# Patient Record
Sex: FEMALE | Race: WHITE
[De-identification: names, ages, dates, MRNs, and addresses within clinical notes are randomized per-mention and may not be internally consistent; named-entity substitution may affect disease eponyms.]

---

## 2014-05-23 VITALS — SYSTOLIC BLOOD PRESSURE: 113 MMHG | DIASTOLIC BLOOD PRESSURE: 72 MMHG

## 2020-01-03 ENCOUNTER — HOSPITAL ENCOUNTER (OUTPATIENT)
Dept: HOSPITAL 63 - CT | Age: 45
Discharge: HOME | End: 2020-01-03
Attending: FAMILY MEDICINE
Payer: COMMERCIAL

## 2020-01-03 DIAGNOSIS — R51: Primary | ICD-10-CM

## 2020-01-03 PROCEDURE — 70450 CT HEAD/BRAIN W/O DYE: CPT

## 2020-01-03 NOTE — RAD
CT Head W/O Contrast:

 

History: Headache with aura 24 hours

 

Comparison: none

 

Axial images were obtained without contrast.

 

The gray and white matter appears normal and symmetrical for the patients 

age.  There is no mass effect, extraaxial fluid collections or 

hydrocephalus.  There is no gross bleed.  There is no focal loss of 

gray-white matter distinction to suggest acute ischemia, i.e. stroke.

 

Impression:  No acute findings.

 

RS Compliance Statement:

 

One or more of the following individualized dose reduction techniques were

utilized for this examination:  

1. Automated exposure control  

2. Adjustment of the mA and/or kV according to patient size  

3. Use of iterative reconstruction technique

 

Electronically signed by: Ray Painting III, MD (1/3/2020 5:34 PM) [facility]

## 2020-09-22 ENCOUNTER — HOSPITAL ENCOUNTER (OUTPATIENT)
Dept: HOSPITAL 61 - KCIC MRI | Age: 45
Discharge: HOME | End: 2020-09-22
Payer: COMMERCIAL

## 2020-09-22 DIAGNOSIS — R22.41: Primary | ICD-10-CM

## 2020-09-22 PROCEDURE — 73720 MRI LWR EXTREMITY W/O&W/DYE: CPT

## 2020-09-22 PROCEDURE — 82565 ASSAY OF CREATININE: CPT

## 2020-09-22 NOTE — KCIC
EXAMINATION: MRI RIGHT PROXIMAL THIGH WITHOUT IV CONTRAST

 

CLINICAL HISTORY: Anterior right thigh mass

 

TECHNIQUE: Multiplanar multisequential images obtained through the 

anterior right proximal thigh without intravenous contrast.

- Contrast: Clariscan 20 mL IV

 

COMPARISON: None

 

 

FINDINGS:  

 

No evidence of soft tissue mass, focal fluid collection, or abnormal 

contrast enhancement at the region of interest in the proximal right 

thigh.

 

Mild reactive changes in the anterior iliac crest at the origin of the 

tensor fascial kuldip muscle. Muscles and tendons otherwise within normal 

limits.

 

No evidence of acute fracture or suspicious marrow replacing process on 

limited evaluation of the right pelvic bones and proximal femur.

 

 

IMPRESSION:  

 

Unremarkable exam. No evidence of soft tissue mass in the region of 

interest.

 

Electronically signed by: Gene Ford DO (9/22/2020 2:15 PM) [id number]

## 2020-10-08 ENCOUNTER — HOSPITAL ENCOUNTER (INPATIENT)
Dept: HOSPITAL 63 - ER | Age: 45
LOS: 2 days | Discharge: ANOTHER HOSPITAL | DRG: 177 | End: 2020-10-10
Attending: INTERNAL MEDICINE | Admitting: INTERNAL MEDICINE
Payer: COMMERCIAL

## 2020-10-08 VITALS — SYSTOLIC BLOOD PRESSURE: 102 MMHG | DIASTOLIC BLOOD PRESSURE: 63 MMHG

## 2020-10-08 VITALS — SYSTOLIC BLOOD PRESSURE: 103 MMHG | DIASTOLIC BLOOD PRESSURE: 74 MMHG

## 2020-10-08 VITALS — WEIGHT: 218.04 LBS | HEIGHT: 63 IN | BODY MASS INDEX: 38.63 KG/M2

## 2020-10-08 VITALS — SYSTOLIC BLOOD PRESSURE: 97 MMHG | DIASTOLIC BLOOD PRESSURE: 57 MMHG

## 2020-10-08 VITALS — DIASTOLIC BLOOD PRESSURE: 65 MMHG | SYSTOLIC BLOOD PRESSURE: 104 MMHG

## 2020-10-08 DIAGNOSIS — G43.909: ICD-10-CM

## 2020-10-08 DIAGNOSIS — E87.1: ICD-10-CM

## 2020-10-08 DIAGNOSIS — J18.9: ICD-10-CM

## 2020-10-08 DIAGNOSIS — J96.01: ICD-10-CM

## 2020-10-08 DIAGNOSIS — E11.65: ICD-10-CM

## 2020-10-08 DIAGNOSIS — K75.81: ICD-10-CM

## 2020-10-08 DIAGNOSIS — U07.1: Primary | ICD-10-CM

## 2020-10-08 DIAGNOSIS — Z79.4: ICD-10-CM

## 2020-10-08 LAB
ALBUMIN SERPL-MCNC: 3.3 G/DL (ref 3.4–5)
ALBUMIN/GLOB SERPL: 0.8 {RATIO} (ref 1–1.7)
ALP SERPL-CCNC: 141 U/L (ref 46–116)
ALT SERPL-CCNC: 100 U/L (ref 14–59)
ANION GAP SERPL CALC-SCNC: 10 MMOL/L (ref 6–14)
APTT PPP: YELLOW S
AST SERPL-CCNC: 101 U/L (ref 15–37)
BACTERIA #/AREA URNS HPF: (no result) /HPF
BASOPHILS # BLD AUTO: 0.1 X10^3/UL (ref 0–0.2)
BASOPHILS NFR BLD: 1 % (ref 0–3)
BILIRUB SERPL-MCNC: 0.2 MG/DL (ref 0.2–1)
BILIRUB UR QL STRIP: (no result)
BUN/CREAT SERPL: 8 (ref 6–20)
CA-I SERPL ISE-MCNC: 10 MG/DL (ref 7–20)
CALCIUM SERPL-MCNC: 8.8 MG/DL (ref 8.5–10.1)
CHLORIDE SERPL-SCNC: 97 MMOL/L (ref 98–107)
CO2 SERPL-SCNC: 25 MMOL/L (ref 21–32)
CREAT SERPL-MCNC: 1.2 MG/DL (ref 0.6–1)
EOSINOPHIL NFR BLD: 0 % (ref 0–3)
EOSINOPHIL NFR BLD: 0 X10^3/UL (ref 0–0.7)
ERYTHROCYTE [DISTWIDTH] IN BLOOD BY AUTOMATED COUNT: 14 % (ref 11.5–14.5)
FIBRINOGEN PPP-MCNC: (no result) MG/DL
GFR SERPLBLD BASED ON 1.73 SQ M-ARVRAT: 48.8 ML/MIN
GLOBULIN SER-MCNC: 4.2 G/DL (ref 2.2–3.8)
GLUCOSE SERPL-MCNC: 234 MG/DL (ref 70–99)
GLUCOSE UR STRIP-MCNC: 100 MG/DL
HCT VFR BLD CALC: 42.1 % (ref 36–47)
HGB BLD-MCNC: 13.7 G/DL (ref 12–15.5)
LYMPHOCYTES # BLD: 1.2 X10^3/UL (ref 1–4.8)
LYMPHOCYTES NFR BLD AUTO: 18 % (ref 24–48)
MAGNESIUM SERPL-MCNC: 1.8 MG/DL (ref 1.8–2.4)
MCH RBC QN AUTO: 30 PG (ref 25–35)
MCHC RBC AUTO-ENTMCNC: 33 G/DL (ref 31–37)
MCV RBC AUTO: 91 FL (ref 79–100)
MONO #: 0.8 X10^3/UL (ref 0–1.1)
MONOCYTES NFR BLD: 13 % (ref 0–9)
NEUT #: 4.5 X10^3UL (ref 1.8–7.7)
NEUTROPHILS NFR BLD AUTO: 69 % (ref 31–73)
NITRITE UR QL STRIP: (no result)
PLATELET # BLD AUTO: 232 X10^3/UL (ref 140–400)
POTASSIUM SERPL-SCNC: 4.3 MMOL/L (ref 3.5–5.1)
PROT SERPL-MCNC: 7.5 G/DL (ref 6.4–8.2)
RBC # BLD AUTO: 4.64 X10^6/UL (ref 3.5–5.4)
RBC #/AREA URNS HPF: (no result) /HPF (ref 0–2)
SODIUM SERPL-SCNC: 132 MMOL/L (ref 136–145)
SP GR UR STRIP: 1.01
SQUAMOUS #/AREA URNS LPF: (no result) /LPF
UROBILINOGEN UR-MCNC: 0.2 MG/DL
WBC # BLD AUTO: 6.6 X10^3/UL (ref 4–11)
WBC #/AREA URNS HPF: >40 /HPF (ref 0–4)

## 2020-10-08 PROCEDURE — 71045 X-RAY EXAM CHEST 1 VIEW: CPT

## 2020-10-08 PROCEDURE — 87186 SC STD MICRODIL/AGAR DIL: CPT

## 2020-10-08 PROCEDURE — 90471 IMMUNIZATION ADMIN: CPT

## 2020-10-08 PROCEDURE — 36600 WITHDRAWAL OF ARTERIAL BLOOD: CPT

## 2020-10-08 PROCEDURE — 83605 ASSAY OF LACTIC ACID: CPT

## 2020-10-08 PROCEDURE — 86140 C-REACTIVE PROTEIN: CPT

## 2020-10-08 PROCEDURE — 87077 CULTURE AEROBIC IDENTIFY: CPT

## 2020-10-08 PROCEDURE — 85610 PROTHROMBIN TIME: CPT

## 2020-10-08 PROCEDURE — 85379 FIBRIN DEGRADATION QUANT: CPT

## 2020-10-08 PROCEDURE — 82553 CREATINE MB FRACTION: CPT

## 2020-10-08 PROCEDURE — 83735 ASSAY OF MAGNESIUM: CPT

## 2020-10-08 PROCEDURE — 93005 ELECTROCARDIOGRAM TRACING: CPT

## 2020-10-08 PROCEDURE — 85730 THROMBOPLASTIN TIME PARTIAL: CPT

## 2020-10-08 PROCEDURE — 85027 COMPLETE CBC AUTOMATED: CPT

## 2020-10-08 PROCEDURE — 85025 COMPLETE CBC W/AUTO DIFF WBC: CPT

## 2020-10-08 PROCEDURE — 96375 TX/PRO/DX INJ NEW DRUG ADDON: CPT

## 2020-10-08 PROCEDURE — 96367 TX/PROPH/DG ADDL SEQ IV INF: CPT

## 2020-10-08 PROCEDURE — 82803 BLOOD GASES ANY COMBINATION: CPT

## 2020-10-08 PROCEDURE — 84484 ASSAY OF TROPONIN QUANT: CPT

## 2020-10-08 PROCEDURE — 96365 THER/PROPH/DIAG IV INF INIT: CPT

## 2020-10-08 PROCEDURE — 87086 URINE CULTURE/COLONY COUNT: CPT

## 2020-10-08 PROCEDURE — 81001 URINALYSIS AUTO W/SCOPE: CPT

## 2020-10-08 PROCEDURE — 82947 ASSAY GLUCOSE BLOOD QUANT: CPT

## 2020-10-08 PROCEDURE — 96361 HYDRATE IV INFUSION ADD-ON: CPT

## 2020-10-08 PROCEDURE — 80053 COMPREHEN METABOLIC PANEL: CPT

## 2020-10-08 PROCEDURE — 36415 COLL VENOUS BLD VENIPUNCTURE: CPT

## 2020-10-08 RX ADMIN — DULOXETINE SCH MG: 60 CAPSULE, DELAYED RELEASE ORAL at 20:57

## 2020-10-08 RX ADMIN — VERAPAMIL HYDROCHLORIDE SCH MG: 120 TABLET, FILM COATED, EXTENDED RELEASE ORAL at 20:57

## 2020-10-08 RX ADMIN — ONDANSETRON PRN MG: 2 INJECTION INTRAMUSCULAR; INTRAVENOUS at 20:59

## 2020-10-08 RX ADMIN — INSULIN GLARGINE SCH UNIT: 100 INJECTION, SOLUTION SUBCUTANEOUS at 21:00

## 2020-10-08 NOTE — RAD
Portable chest x-ray for shortness of breath, no prior.

 

FINDINGS: Right hemidiaphragm is slightly elevated. There is a linear band

of atelectasis in the right midlung, and there may be a subtle left 

basilar infiltrate. Heart size within normal limits.

 

Impression:

1. Right midlung atelectasis, and possible subtle left retrocardiac 

infiltrate.

 

Electronically signed by: George Prieto MD (10/8/2020 8:49 AM) [id number]

## 2020-10-08 NOTE — PHYS DOC
Past History


Past Medical History:  Diabetes, DVT, Fibromyalgia, Hypertension, Kidney Stones,

Migraines


Past Surgical History:  Cholecystectomy, Hysterectomy


Smoking:  Non-smoker


Alcohol Use:  None


Drug Use:  None





General Adult


EDM:


Chief Complaint:  HEADACHE





HPI:


HPI:





45-year-old female presents with 2-day history of generalized malaise, headache,

and nausea.  Patient also reports subjective fever and chills.  Patient reports 

she does have a history of chronic migraines but reports this headache is worse 

than her normal.  Patient reports her muscles "ache ".  Denies known exposure to

COVID-19.  Denies known sick contacts.  Patient does report some associated 

shortness of air.





Review of Systems:


Review of Systems:





Constitutional: Reports subjective fever and chills 


Eyes: Denies redness or eye pain 


HENT: Denies nasal congestion or sore throat


Respiratory: Denies cough; reports shortness of breath 


Cardiovascular: Denies chest pain or palpitations


GI: Denies abdominal pain or vomiting; reports nausea


: Denies dysuria or hematuria


Musculoskeletal: Denies back pain or joint pain


Integument: Denies rash or skin lesions 


Neurologic: Reports headache; denies focal weakness or sensory changes





Complete systems were reviewed and found to be within normal limits, except as 

documented in this note.





Current Medications:


Current Meds:





Current Medications








 Medications


  (Trade)  Dose


 Ordered  Sig/[facility]  Start Time


 Stop Time Status Last Admin


Dose Admin


 


 Acetaminophen/


 Butalbital/


 Caffeine


  (Fioricet)  1 tab  1X  ONCE  10/8/20 05:00


 10/8/20 05:01 DC  





 


 Dexamethasone


 Sodium Phosphate


  (Decadron)  10 mg  1X  ONCE  10/8/20 05:00


 10/8/20 05:01 DC  





 


 Ondansetron HCl


  (Zofran)  4 mg  1X  ONCE  10/8/20 04:30


 10/8/20 04:31 DC 10/8/20 04:14


4 MG


 


 Sodium Chloride  1,000 ml @ 


 1,000 mls/hr  1X  ONCE  10/8/20 04:30


 10/8/20 05:29 DC 10/8/20 04:14


1,000 MLS/HR











Allergies:


Allergies:





Allergies








Coded Allergies Type Severity Reaction Last Updated Verified


 


  meperidine Allergy Severe  10/8/20 Yes


 


  morphine Allergy Intermediate  10/8/20 Yes











Physical Exam:


PE:





Constitutional: Well developed, well nourished, uncomfortable, non-toxic ap

pearance


HENT: Normocephalic, atraumatic


Eyes: PERRL, EOMI, conjunctiva normal, no discharge, no nystagmus noted


Neck: Normal range of motion, reports tenderness with rotation of neck, supple, 

no meningeal signs


Lungs & Thorax:  No respiratory distress, equal chest rise and fall


Abdomen: Soft, no tenderness, no guarding/rebound tenderness


Skin: Warm, dry, no erythema, no rash


Extremities: No tenderness, ROM intact, no edema


Neurologic: Alert and oriented X 3, normal motor function, normal sensory 

function, no focal deficits noted


Psychologic: Affect anxious, judgment normal





Current Patient Data:


Labs:





                                Laboratory Tests








Test


 10/8/20


04:29 10/8/20


05:00


 


White Blood Count


 6.6 x10^3/uL


(4.0-11.0) 





 


Red Blood Count


 4.64 x10^6/uL


(3.50-5.40) 





 


Hemoglobin


 13.7 g/dL


(12.0-15.5) 





 


Hematocrit


 42.1 %


(36.0-47.0) 





 


Mean Corpuscular Volume


 91 fL ()


 





 


Mean Corpuscular Hemoglobin 30 pg (25-35)   


 


Mean Corpuscular Hemoglobin


Concent 33 g/dL


(31-37) 





 


Red Cell Distribution Width


 14.0 %


(11.5-14.5) 





 


Platelet Count


 232 x10^3/uL


(140-400) 





 


Neutrophils (%) (Auto) 69 % (31-73)   


 


Lymphocytes (%) (Auto) 18 % (24-48)  L 


 


Monocytes (%) (Auto) 13 % (0-9)  H 


 


Eosinophils (%) (Auto) 0 % (0-3)   


 


Basophils (%) (Auto) 1 % (0-3)   


 


Neutrophils # (Auto)


 4.5 x10^3uL


(1.8-7.7) 





 


Lymphocytes # (Auto)


 1.2 x10^3/uL


(1.0-4.8) 





 


Monocytes # (Auto)


 0.8 x10^3/uL


(0.0-1.1) 





 


Eosinophils # (Auto)


 0.0 x10^3/uL


(0.0-0.7) 





 


Basophils # (Auto)


 0.1 x10^3/uL


(0.0-0.2) 





 


Sodium Level


 132 mmol/L


(136-145)  L 





 


Potassium Level


 4.3 mmol/L


(3.5-5.1) 





 


Chloride Level


 97 mmol/L


()  L 





 


Carbon Dioxide Level


 25 mmol/L


(21-32) 





 


Anion Gap 10 (6-14)   


 


Blood Urea Nitrogen


 10 mg/dL


(7-20) 





 


Creatinine


 1.2 mg/dL


(0.6-1.0)  H 





 


Estimated GFR


(Cockcroft-Gault) 48.8  


 





 


BUN/Creatinine Ratio 8 (6-20)   


 


Glucose Level


 234 mg/dL


(70-99)  H 





 


Lactic Acid Level


 1.8 mmol/L


(0.4-2.0) 





 


Calcium Level


 8.8 mg/dL


(8.5-10.1) 





 


Magnesium Level


 1.8 mg/dL


(1.8-2.4) 





 


Total Bilirubin


 0.2 mg/dL


(0.2-1.0) 





 


Aspartate Amino Transferase


(AST) 101 U/L


(15-37)  H 





 


Alanine Aminotransferase (ALT)


 100 U/L


(14-59)  H 





 


Alkaline Phosphatase


 141 U/L


()  H 





 


Creatine Kinase


 68 U/L


() 





 


Creatine Kinase MB (Mass)


 < 0.5 ng/mL


(0.0-3.6) 





 


Creatine Kinase MB Relative


Index 0.7 % (0-4)  


 





 


Troponin I Quantitative


 < 0.017 ng/mL


(0-0.055) 





 


Total Protein


 7.5 g/dL


(6.4-8.2) 





 


Albumin


 3.3 g/dL


(3.4-5.0)  L 





 


Albumin/Globulin Ratio


 0.8 (1.0-1.7)


L 





 


Prothrombin Time


 


 10.6 SEC


(9.4-11.4)


 


Prothrombin Time INR  1.0 (0.9-1.1)  


 


Activated Partial


Thromboplast Time 


 25 SEC (23-33)





 


D-Dimer (Jada)


 


 1.05 mg/L


(0.00-0.50)  H








Vital Signs:





                                   Vital Signs








  Date Time  Temp Pulse Resp B/P (MAP) Pulse Ox O2 Delivery O2 Flow Rate FiO2


 


10/8/20 04:15 100.0       


 


10/8/20 03:46  122 26 138/86 (103) 92 Room Air  











EKG:


EKG:


@0446 NSR at 96bpm, NO ST elevation, QRS 76ms, QT/QTc 344/435ms





Radiology/Procedures:


Radiology/Procedures:


[]





Course & Med Decision Making:


Course & Med Decision Making


Pertinent Labs and Imaging studies reviewed. (See chart for details)





Patient presents with report of headache different than her normal migraines 

with associated body aches, generalized malaise and subjective fever/chills with

 associated nausea x 2 days.  Denies known sick contacts.  Denies trauma.  

Patient neurologically intact.  NO meningeal signs.  Patient with temp up to 

100F upon arrival.  Sats also down to 90% on RA. Improved with supplemental O2. 

Concern for possible COVID. COVID precautions in place.  COVID testing pending. 

 EKG with tachycardia.  Labs obtained and posted to chart.  WBC and lactic acid 

WNL.  Troponin WNL.  D-dimer elevated.





CTA chest ordered and pending.





0600- Sign out given to Dr. Stafford for further evaluation and final 

disposition.  Discussed current findings and plan with patient, who acknowledges

 understanding and agreement.





Dragon Disclaimer:


Dragon Disclaimer:


This electronic medical record was generated, in whole or in part, using a voice

 recognition dictation system.





Departure


Departure:


Impression:  


   Primary Impression:  


   Headache


   Qualified Codes:  R51.9 - Headache, unspecified


   Additional Impressions:  


   Suspected 2019 novel coronavirus infection


   Hypoxia


   Elevated d-dimer


   Elevated liver enzymes


Referrals:  


FELECIA SINGH MD (PCP)





COVID-19 Assessment


COVID-19 Patient Risks:


Age 65 or older:  No


Sign of co-morbidity:  Yes


Exp to person + for COVID:  No


Exp to PUI:  No


Travel from affected area:  No


Lower respiratory symptoms:  Yes


Fever:  Yes


Other:  Yes (HA)





PPE Use:


Full PPE with N95 mask or PAPR:  Yes











SURESH CORTEZ DO              Oct 8, 2020 05:53

## 2020-10-08 NOTE — NUR
NURSING NOTE: ADMISSION

PT ARRIVED VIA EMS AT 1050. PT SETTLED IN ROOM. VITALS TAKEN. PT ORIENTED TO ROOM. 
MEDICATIONS REVIEWED, HISTORY REVIEWED. PT HAD NO QUESTIONS OR COMPLAINTS AT THIS TIME. PT 
RESTING IN ROOM. 

LEE THOMPSON

## 2020-10-08 NOTE — HP
ADMIT DATE:  10/08/2020



HISTORY OF PRESENT ILLNESS:  The patient is a 45-year-old  female

patient, who came to the Emergency Room with complaint of headache, generalized

malaise and nausea.  The patient also reports subjective fever and chills. 

Reports she does have a history of chronic migraines, but reports her headache

is worse than her normal.  She has also generalized myalgia, arthralgia.  She

denies exposure to COVID-19.  No known sick contacts.  Did complain of some

shortness of air.  She was evaluated in the Emergency Room and was extensively

evaluated.  She has had lab work, which was generally unremarkable except

elevated liver enzymes.  Her D-dimer was slightly elevated at 1.05.  Urinalysis

showed more than 40 wbc's, the urine was yellow turbid and showed many bacteria.

 She has also had a chest x-ray, which basically showed the patient to have

right mid lung atelectasis and possible subtle left retrocardiac infiltrate and

therefore, the patient was admitted with possible COVID-19 infection, elevated

liver enzymes and possible community-acquired pneumonia.  She was started on IV

antibiotic and she was started on Rocephin and Zithromax and will be admitted

for community-acquired pneumonia.  She was swabbed for coronavirus and was kept

on droplet precaution for now.



PAST MEDICAL HISTORY:  Significant for migraine, diabetes mellitus,

fibromyalgia, hypertension, nephrolithiasis, nonalcoholic steatohepatitis and

obstructive sleep apnea.



PAST SURGICAL HISTORY:  Significant for cholecystectomy, total abdominal

hysterectomy.  She did have also colonoscopy and a spontaneous pneumothorax that

required chest tube placement, she was likely in high school.



ALLERGIES:  She is allergic to MORPHINE and DEMEROL.



MEDICATIONS:  We are still waiting to get the list of her home medications.



FAMILY HISTORY:  She has 2 brothers, who are younger and healthy.  Her younger

sister  at age of 34 from cancer.  Her father is still alive, but does not

really keep in touch with her.  Her mother is still alive at the age of 64 and

was diagnosed with colon cancer.



SOCIAL HISTORY:  She is , has 3 daughters and 1 son.  Never smoked, does

not drink alcohol or use recreational drugs.  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

billing.



REVIEW OF SYSTEMS:  The patient denied any blurring of vision, cataract,

glaucoma or macular degeneration.  Denied any earache, tinnitus or sensorineural

deafness.  Denied any nosebleeds, stuffy nose or postnasal drip.  Denied any

sore throat, sore tongue, toothache, hoarseness of voice or difficulty

swallowing.  Did complain of nausea, but no vomiting.  Denied any diarrhea or

constipation.  Denied any dysuria, frequency or hematuria.  Denied any chest

pain.  Did complain of shortness of breath, continued to have headache and also

subjective feeling of fevers and chills.  



PHYSICAL EXAMINATION:

GENERAL:  When I examined her, she was resting slightly propped up in bed, in no

apparent respiratory distress.  On arrival, there was no pallor, jaundice,

cyanosis or thyromegaly.  No jugular venous distention.  No limb edema.

VITAL SIGNS:  Her heart rate was 122, blood pressure was 138/86, temperature was

100, respiratory rate was 26 and oxygen saturation was 92% on room air.

HEAD, EYES, EARS, NOSE AND THROAT:  Showed normocephalic, atraumatic.

NECK:  Supple.

HEART:  Showed normal first and second heart sounds.  No gallop or murmur.

CHEST:  Clear to auscultation.  No crepitation or rhonchi.

ABDOMEN:  Distended, soft, nontender.  No guarding or rigidity.  No

organomegaly.  All hernial orifice intact.  Bowel sounds normal.

NEUROLOGIC:  She was awake, alert, responding appropriately.  All cranial nerves

are intact.

EXTREMITIES:  She moves extremities without difficulty.  She normally ambulates

without assistance or assistive devices.



LABORATORY DATA:  Her lab work on arrival this morning showed a white cell count

of 6600, hemoglobin 14, hematocrit 42, MCV 91, and a platelet count of 232,000. 

Her chemistry showed serum sodium of 132, potassium 4.3, chloride 97,

bicarbonate 25, anion gap of 10, BUN 10, creatinine 1.2, estimated GFR was 48 mL

per minute, her glucose was 134, lactic acid was 1.8, calcium was 8.8, magnesium

was 1.8.  Total bilirubin is normal.  AST, ALT, alkaline phosphatase are all

elevated.  CK was 68.  Troponin was less than 0.017.  Total protein 7.5, albumin

was 3.3.  Her prothrombin time, INR and aPTT are normal.  D-dimer slightly

elevated at 1.05.  Urinalysis showed the urine was yellow turbid with a pH of

5.5, specific gravity 1.010.  The urine was negative for protein, small amount

of glucose, negative for ketones, blood, nitrite, but there was moderate amount

of leukocyte esterase, there was 1-2 rbc's, more than 40 wbc's, and too many

bacteria.  Her chest x-ray showed that the patient has right mid lung

atelectasis and possible subtle left retrocardiac infiltrate.



ASSESSMENT AND PLAN:  The patient was admitted with probably community-acquired

pneumonia, questionable coronavirus infection.  She has elevated D-dimer.  She

has mild hyponatremia, hyperglycemia and elevated liver enzymes, likely due to

nonalcoholic steatohepatitis.  The patient was treated with IV fluid, IV

ceftriaxone and Zithromax.  We will continue the antibiotics.  Continue with all

her home medication.  Monitor her lab works closely and keep on droplet

precaution and await the result of her COVID-19 swab.





______________________________

JULIO CISSE MD



DR:  GOKUL/glo  JOB#:  [id number] / [id number]

DD:  10/08/2020 17:04  DT:  10/08/2020 17:49

## 2020-10-08 NOTE — EKG
[facility] [address], [place] [address]

Test Date:    2020-10-08               Test Time:    04:46:49

Pat Name:     FRANKO ALBRECHT          Department:   

Patient ID:   [id number]           Room:          

Gender:       F                        Technician:   

:          [id number]               Requested By: SURESH CORTEZ

Order Number: [id number]            Reading MD:     

                                 Measurements

Intervals                              Axis          

Rate:         96                       P:            29

IA:           138                      QRS:          26

QRSD:         76                       T:            59

QT:           344                                    

QTc:          435                                    

                           Interpretive Statements

SINUS RHYTHM

NORMAL ECG

RI6.02

No previous ECG available for comparison

## 2020-10-09 VITALS — SYSTOLIC BLOOD PRESSURE: 108 MMHG | DIASTOLIC BLOOD PRESSURE: 63 MMHG

## 2020-10-09 VITALS — SYSTOLIC BLOOD PRESSURE: 117 MMHG | DIASTOLIC BLOOD PRESSURE: 65 MMHG

## 2020-10-09 VITALS — SYSTOLIC BLOOD PRESSURE: 110 MMHG | DIASTOLIC BLOOD PRESSURE: 68 MMHG

## 2020-10-09 VITALS — DIASTOLIC BLOOD PRESSURE: 71 MMHG | SYSTOLIC BLOOD PRESSURE: 117 MMHG

## 2020-10-09 VITALS — DIASTOLIC BLOOD PRESSURE: 55 MMHG | SYSTOLIC BLOOD PRESSURE: 95 MMHG

## 2020-10-09 LAB
ALBUMIN SERPL-MCNC: 3 G/DL (ref 3.4–5)
ALBUMIN/GLOB SERPL: 0.7 {RATIO} (ref 1–1.7)
ALP SERPL-CCNC: 126 U/L (ref 46–116)
ALT SERPL-CCNC: 81 U/L (ref 14–59)
ANION GAP SERPL CALC-SCNC: 7 MMOL/L (ref 6–14)
AST SERPL-CCNC: 50 U/L (ref 15–37)
BASOPHILS # BLD AUTO: 0 X10^3/UL (ref 0–0.2)
BASOPHILS NFR BLD: 0 % (ref 0–3)
BILIRUB SERPL-MCNC: 0.2 MG/DL (ref 0.2–1)
BUN/CREAT SERPL: 14 (ref 6–20)
CA-I SERPL ISE-MCNC: 15 MG/DL (ref 7–20)
CALCIUM SERPL-MCNC: 8.6 MG/DL (ref 8.5–10.1)
CHLORIDE SERPL-SCNC: 101 MMOL/L (ref 98–107)
CO2 SERPL-SCNC: 28 MMOL/L (ref 21–32)
CREAT SERPL-MCNC: 1.1 MG/DL (ref 0.6–1)
CRP SERPL-MCNC: 45 MG/L (ref 0–3.3)
EOSINOPHIL NFR BLD: 0 % (ref 0–3)
EOSINOPHIL NFR BLD: 0 X10^3/UL (ref 0–0.7)
ERYTHROCYTE [DISTWIDTH] IN BLOOD BY AUTOMATED COUNT: 13.9 % (ref 11.5–14.5)
GFR SERPLBLD BASED ON 1.73 SQ M-ARVRAT: 54 ML/MIN
GLOBULIN SER-MCNC: 4.1 G/DL (ref 2.2–3.8)
GLUCOSE SERPL-MCNC: 311 MG/DL (ref 70–99)
HCT VFR BLD CALC: 39.9 % (ref 36–47)
HGB BLD-MCNC: 12.8 G/DL (ref 12–15.5)
LYMPHOCYTES # BLD: 1 X10^3/UL (ref 1–4.8)
LYMPHOCYTES NFR BLD AUTO: 18 % (ref 24–48)
MCH RBC QN AUTO: 30 PG (ref 25–35)
MCHC RBC AUTO-ENTMCNC: 32 G/DL (ref 31–37)
MCV RBC AUTO: 92 FL (ref 79–100)
MONO #: 0.6 X10^3/UL (ref 0–1.1)
MONOCYTES NFR BLD: 10 % (ref 0–9)
NEUT #: 4.3 X10^3UL (ref 1.8–7.7)
NEUTROPHILS NFR BLD AUTO: 73 % (ref 31–73)
PLATELET # BLD AUTO: 238 X10^3/UL (ref 140–400)
POTASSIUM SERPL-SCNC: 4.1 MMOL/L (ref 3.5–5.1)
PROT SERPL-MCNC: 7.1 G/DL (ref 6.4–8.2)
RBC # BLD AUTO: 4.34 X10^6/UL (ref 3.5–5.4)
SODIUM SERPL-SCNC: 136 MMOL/L (ref 136–145)
WBC # BLD AUTO: 5.9 X10^3/UL (ref 4–11)

## 2020-10-09 RX ADMIN — AMITRIPTYLINE HYDROCHLORIDE SCH MG: 75 TABLET, FILM COATED ORAL at 09:03

## 2020-10-09 RX ADMIN — INSULIN LISPRO SCH UNITS: 100 INJECTION, SOLUTION INTRAVENOUS; SUBCUTANEOUS at 12:23

## 2020-10-09 RX ADMIN — ONDANSETRON PRN MG: 2 INJECTION INTRAMUSCULAR; INTRAVENOUS at 20:49

## 2020-10-09 RX ADMIN — INSULIN GLARGINE SCH UNIT: 100 INJECTION, SOLUTION SUBCUTANEOUS at 20:50

## 2020-10-09 RX ADMIN — VERAPAMIL HYDROCHLORIDE SCH MG: 120 TABLET, FILM COATED, EXTENDED RELEASE ORAL at 09:03

## 2020-10-09 RX ADMIN — LINAGLIPTIN SCH MG: 5 TABLET, FILM COATED ORAL at 09:02

## 2020-10-09 RX ADMIN — DULOXETINE SCH MG: 60 CAPSULE, DELAYED RELEASE ORAL at 09:02

## 2020-10-09 RX ADMIN — INSULIN LISPRO SCH UNITS: 100 INJECTION, SOLUTION INTRAVENOUS; SUBCUTANEOUS at 09:04

## 2020-10-09 RX ADMIN — LISINOPRIL SCH MG: 5 TABLET ORAL at 09:03

## 2020-10-09 RX ADMIN — HYDROCODONE BITARTRATE AND ACETAMINOPHEN PRN TAB: 5; 325 TABLET ORAL at 20:51

## 2020-10-09 RX ADMIN — ONDANSETRON PRN MG: 2 INJECTION INTRAMUSCULAR; INTRAVENOUS at 16:58

## 2020-10-09 RX ADMIN — ONDANSETRON PRN MG: 2 INJECTION INTRAMUSCULAR; INTRAVENOUS at 05:05

## 2020-10-09 RX ADMIN — VERAPAMIL HYDROCHLORIDE SCH MG: 120 TABLET, FILM COATED, EXTENDED RELEASE ORAL at 20:56

## 2020-10-09 RX ADMIN — VERAPAMIL HYDROCHLORIDE SCH MG: 120 TABLET, FILM COATED, EXTENDED RELEASE ORAL at 20:58

## 2020-10-09 RX ADMIN — DULOXETINE SCH MG: 60 CAPSULE, DELAYED RELEASE ORAL at 20:56

## 2020-10-09 RX ADMIN — INSULIN LISPRO SCH UNITS: 100 INJECTION, SOLUTION INTRAVENOUS; SUBCUTANEOUS at 16:58

## 2020-10-09 NOTE — PN
DATE:  10/09/2020



SUBJECTIVE:  The patient is resting, slightly propped up in bed, continued to

complain of severe headache despite that has not responded to injection of

fentanyl.  Denied any nausea or vomiting.  Denied any blurring of vision,

diplopia, tingling or numbness.



PHYSICAL EXAMINATION:

GENERAL:  When I examined her, she was somewhat flushed.  There is definitely no

pallor, jaundice, cyanosis or thyromegaly.  No jugular venous distention.  No

limb edema.

VITAL SIGNS:  Her heart rate was 104, blood pressure was 108/63, temperature was

99.6, respiratory rate was 20, and oxygen saturation was 95% on 2 liters of

oxygen.

HEAD, EYES, EARS, NOSE AND THROAT:  Showed normocephalic, atraumatic.

NECK:  Supple.

HEART:  Normal first and second heart sounds.  No gallop, rub or murmur.

CHEST:  Showed central trachea, equal bilateral expansion, air entry.  Vesicular

sounds with crepitation mostly on the right side posteriorly.  I could not

appreciate any rhonchi.

ABDOMEN:  Distended, soft, nontender.

NEUROLOGIC:  She was grossly intact.



Her intake over the last 24 hours and output were incompletely recorded.



LABORATORY DATA:  Her lab work this morning showed a white cell count of 5900,

hemoglobin 12.8, hematocrit 39, MCV 92, and platelet count 238,000.  Her

chemistry showed a serum sodium of 136, potassium 4.1, chloride 101, bicarbonate

28, anion gap of 7, BUN 15, creatinine 1.1, estimated GFR was 54 mL per minute. 

Her glucose was 311, calcium was 8.6.  Total bilirubin is normal.  AST, ALT,

alkaline phosphatase is slightly elevated, but trending down.  Her C-reactive

protein was 45.  Total protein 7.1, albumin 3.  Her total prothrombin time, INR

and aPTT normal.  D-dimer was slightly elevated at 1.05.  Urinalysis showed too

many bacteria and more than 40 wbc's and moderate amount of leukocyte esterase.



ASSESSMENT:

1.  Healthcare-associated pneumonia.

2.  Questionable coronavirus infection.

3.  Hyponatremia, most probably dilutional hyperglycemia due to type 2 diabetes.

4.  Elevated liver enzymes, likely due to nonalcoholic steatohepatitis.

5.  Severe migraine headache.



PLAN:  My plan is to continue with IV ceftriaxone and Zithromax.  I continued

all her home medication.  We did start her on fentanyl hoping that that will

take care of her migraine headache without much improvement and therefore I will

start a trial of Imitrex 6 mg subcutaneous once this afternoon and we will

decide on further management accordingly.  Her COVID-19 is still pending at the

time of this dictation.





______________________________

JULIO CISSE MD



DR:  GOKUL/glo  JOB#:  [id number] / [id number]

DD:  10/09/2020 15:32  DT:  10/09/2020 19:29

## 2020-10-10 ENCOUNTER — HOSPITAL ENCOUNTER (INPATIENT)
Dept: HOSPITAL 61 - 6 SOUTH | Age: 45
LOS: 18 days | Discharge: NURSING, REHAB OR LONG-TERM CARE | DRG: 207 | End: 2020-10-28
Payer: COMMERCIAL

## 2020-10-10 VITALS — DIASTOLIC BLOOD PRESSURE: 53 MMHG | SYSTOLIC BLOOD PRESSURE: 91 MMHG

## 2020-10-10 VITALS — SYSTOLIC BLOOD PRESSURE: 106 MMHG | DIASTOLIC BLOOD PRESSURE: 64 MMHG

## 2020-10-10 VITALS — BODY MASS INDEX: 37.66 KG/M2 | WEIGHT: 212.53 LBS | HEIGHT: 63 IN

## 2020-10-10 VITALS — SYSTOLIC BLOOD PRESSURE: 91 MMHG | DIASTOLIC BLOOD PRESSURE: 65 MMHG

## 2020-10-10 VITALS — SYSTOLIC BLOOD PRESSURE: 116 MMHG | DIASTOLIC BLOOD PRESSURE: 70 MMHG

## 2020-10-10 VITALS — SYSTOLIC BLOOD PRESSURE: 124 MMHG | DIASTOLIC BLOOD PRESSURE: 68 MMHG

## 2020-10-10 VITALS — SYSTOLIC BLOOD PRESSURE: 93 MMHG | DIASTOLIC BLOOD PRESSURE: 68 MMHG

## 2020-10-10 DIAGNOSIS — J96.01: ICD-10-CM

## 2020-10-10 DIAGNOSIS — K75.81: ICD-10-CM

## 2020-10-10 DIAGNOSIS — J15.6: ICD-10-CM

## 2020-10-10 DIAGNOSIS — G72.9: ICD-10-CM

## 2020-10-10 DIAGNOSIS — U07.1: Primary | ICD-10-CM

## 2020-10-10 DIAGNOSIS — N20.0: ICD-10-CM

## 2020-10-10 DIAGNOSIS — J12.89: ICD-10-CM

## 2020-10-10 DIAGNOSIS — E66.01: ICD-10-CM

## 2020-10-10 DIAGNOSIS — G43.909: ICD-10-CM

## 2020-10-10 DIAGNOSIS — Z80.0: ICD-10-CM

## 2020-10-10 DIAGNOSIS — E11.9: ICD-10-CM

## 2020-10-10 DIAGNOSIS — I10: ICD-10-CM

## 2020-10-10 DIAGNOSIS — Z87.442: ICD-10-CM

## 2020-10-10 DIAGNOSIS — Z88.5: ICD-10-CM

## 2020-10-10 DIAGNOSIS — M79.7: ICD-10-CM

## 2020-10-10 DIAGNOSIS — G72.81: ICD-10-CM

## 2020-10-10 DIAGNOSIS — J44.0: ICD-10-CM

## 2020-10-10 DIAGNOSIS — Z90.710: ICD-10-CM

## 2020-10-10 DIAGNOSIS — Z90.49: ICD-10-CM

## 2020-10-10 DIAGNOSIS — Z88.8: ICD-10-CM

## 2020-10-10 DIAGNOSIS — G47.33: ICD-10-CM

## 2020-10-10 DIAGNOSIS — J98.11: ICD-10-CM

## 2020-10-10 LAB
ALBUMIN SERPL-MCNC: 3 G/DL (ref 3.4–5)
ALBUMIN/GLOB SERPL: 0.8 {RATIO} (ref 1–1.7)
ALP SERPL-CCNC: 125 U/L (ref 46–116)
ALT SERPL-CCNC: 86 U/L (ref 14–59)
ANION GAP SERPL CALC-SCNC: 7 MMOL/L (ref 6–14)
AST SERPL-CCNC: 123 U/L (ref 15–37)
BGAS PCO2: 41 MMHG (ref 35–45)
BGAS PH: 7.39 (ref 7.35–7.45)
BGAS PO2: 60 MMHG (ref 80–100)
BILIRUB SERPL-MCNC: 0.3 MG/DL (ref 0.2–1)
BUN/CREAT SERPL: 12 (ref 6–20)
CA-I SERPL ISE-MCNC: 16 MG/DL (ref 7–20)
CALCIUM SERPL-MCNC: 8.1 MG/DL (ref 8.5–10.1)
CHLORIDE SERPL-SCNC: 99 MMOL/L (ref 98–107)
CO2 SERPL-SCNC: 29 MMOL/L (ref 21–32)
CREAT SERPL-MCNC: 1.3 MG/DL (ref 0.6–1)
DELTA BASE BGAS: 0 MMOL/L (ref 0–3)
ERYTHROCYTE [DISTWIDTH] IN BLOOD BY AUTOMATED COUNT: 14.3 % (ref 11.5–14.5)
GFR SERPLBLD BASED ON 1.73 SQ M-ARVRAT: 44.5 ML/MIN
GLOBULIN SER-MCNC: 4 G/DL (ref 2.2–3.8)
GLUCOSE SERPL-MCNC: 173 MG/DL (ref 70–99)
HCT VFR BLD CALC: 40.2 % (ref 36–47)
HGB BLD-MCNC: 13.1 G/DL (ref 12–15.5)
MCH RBC QN AUTO: 30 PG (ref 25–35)
MCHC RBC AUTO-ENTMCNC: 33 G/DL (ref 31–37)
MCV RBC AUTO: 92 FL (ref 79–100)
O2 SAT BGAS: 89 % (ref 92–99)
O2/TOTAL GAS SETTING VFR VENT: 50 %
PLATELET # BLD AUTO: 199 X10^3/UL (ref 140–400)
POTASSIUM SERPL-SCNC: 3.7 MMOL/L (ref 3.5–5.1)
PROT SERPL-MCNC: 7 G/DL (ref 6.4–8.2)
RBC # BLD AUTO: 4.39 X10^6/UL (ref 3.5–5.4)
SODIUM SERPL-SCNC: 135 MMOL/L (ref 136–145)
WBC # BLD AUTO: 5.7 X10^3/UL (ref 4–11)

## 2020-10-10 PROCEDURE — 82728 ASSAY OF FERRITIN: CPT

## 2020-10-10 PROCEDURE — U0003 INFECTIOUS AGENT DETECTION BY NUCLEIC ACID (DNA OR RNA); SEVERE ACUTE RESPIRATORY SYNDROME CORONAVIRUS 2 (SARS-COV-2) (CORONAVIRUS DISEASE [COVID-19]), AMPLIFIED PROBE TECHNIQUE, MAKING USE OF HIGH THROUGHPUT TECHNOLOGIES AS DESCRIBED BY CMS-2020-01-R: HCPCS

## 2020-10-10 PROCEDURE — 94003 VENT MGMT INPAT SUBQ DAY: CPT

## 2020-10-10 PROCEDURE — 86850 RBC ANTIBODY SCREEN: CPT

## 2020-10-10 PROCEDURE — 85025 COMPLETE CBC W/AUTO DIFF WBC: CPT

## 2020-10-10 PROCEDURE — 85027 COMPLETE CBC AUTOMATED: CPT

## 2020-10-10 PROCEDURE — 82962 GLUCOSE BLOOD TEST: CPT

## 2020-10-10 PROCEDURE — 36415 COLL VENOUS BLD VENIPUNCTURE: CPT

## 2020-10-10 PROCEDURE — 86900 BLOOD TYPING SEROLOGIC ABO: CPT

## 2020-10-10 PROCEDURE — P9017 PLASMA 1 DONOR FRZ W/IN 8 HR: HCPCS

## 2020-10-10 PROCEDURE — 87040 BLOOD CULTURE FOR BACTERIA: CPT

## 2020-10-10 PROCEDURE — 74018 RADEX ABDOMEN 1 VIEW: CPT

## 2020-10-10 PROCEDURE — C9113 INJ PANTOPRAZOLE SODIUM, VIA: HCPCS

## 2020-10-10 PROCEDURE — G0378 HOSPITAL OBSERVATION PER HR: HCPCS

## 2020-10-10 PROCEDURE — 94002 VENT MGMT INPAT INIT DAY: CPT

## 2020-10-10 PROCEDURE — 80053 COMPREHEN METABOLIC PANEL: CPT

## 2020-10-10 PROCEDURE — 85379 FIBRIN DEGRADATION QUANT: CPT

## 2020-10-10 PROCEDURE — 82805 BLOOD GASES W/O2 SATURATION: CPT

## 2020-10-10 PROCEDURE — 80048 BASIC METABOLIC PNL TOTAL CA: CPT

## 2020-10-10 PROCEDURE — 86140 C-REACTIVE PROTEIN: CPT

## 2020-10-10 PROCEDURE — 94760 N-INVAS EAR/PLS OXIMETRY 1: CPT

## 2020-10-10 PROCEDURE — 85007 BL SMEAR W/DIFF WBC COUNT: CPT

## 2020-10-10 PROCEDURE — 36600 WITHDRAWAL OF ARTERIAL BLOOD: CPT

## 2020-10-10 PROCEDURE — 86901 BLOOD TYPING SEROLOGIC RH(D): CPT

## 2020-10-10 PROCEDURE — 71045 X-RAY EXAM CHEST 1 VIEW: CPT

## 2020-10-10 PROCEDURE — 80202 ASSAY OF VANCOMYCIN: CPT

## 2020-10-10 PROCEDURE — 86927 PLASMA FRESH FROZEN: CPT

## 2020-10-10 RX ADMIN — HYDROCODONE BITARTRATE AND ACETAMINOPHEN PRN TAB: 5; 325 TABLET ORAL at 05:01

## 2020-10-10 RX ADMIN — METHYLPREDNISOLONE SODIUM SUCCINATE SCH MG: 40 INJECTION, POWDER, FOR SOLUTION INTRAMUSCULAR; INTRAVENOUS at 21:48

## 2020-10-10 RX ADMIN — LISINOPRIL SCH MG: 5 TABLET ORAL at 17:38

## 2020-10-10 RX ADMIN — LISINOPRIL SCH MG: 5 TABLET ORAL at 09:00

## 2020-10-10 RX ADMIN — INSULIN GLARGINE SCH UNIT: 100 INJECTION, SOLUTION SUBCUTANEOUS at 20:50

## 2020-10-10 RX ADMIN — INSULIN LISPRO SCH UNITS: 100 INJECTION, SOLUTION INTRAVENOUS; SUBCUTANEOUS at 08:29

## 2020-10-10 RX ADMIN — VERAPAMIL HYDROCHLORIDE SCH MG: 120 TABLET, FILM COATED, EXTENDED RELEASE ORAL at 20:47

## 2020-10-10 RX ADMIN — AMITRIPTYLINE HYDROCHLORIDE SCH MG: 75 TABLET, FILM COATED ORAL at 08:26

## 2020-10-10 RX ADMIN — LINAGLIPTIN SCH MG: 5 TABLET, FILM COATED ORAL at 08:26

## 2020-10-10 RX ADMIN — DULOXETINE SCH MG: 60 CAPSULE, DELAYED RELEASE ORAL at 08:26

## 2020-10-10 RX ADMIN — LINAGLIPTIN SCH MG: 5 TABLET, FILM COATED ORAL at 17:38

## 2020-10-10 RX ADMIN — HYDROCODONE BITARTRATE AND ACETAMINOPHEN PRN TAB: 5; 325 TABLET ORAL at 09:25

## 2020-10-10 RX ADMIN — ONDANSETRON PRN MG: 2 INJECTION INTRAMUSCULAR; INTRAVENOUS at 08:57

## 2020-10-10 RX ADMIN — ONDANSETRON PRN MG: 2 INJECTION INTRAMUSCULAR; INTRAVENOUS at 13:48

## 2020-10-10 RX ADMIN — VERAPAMIL HYDROCHLORIDE SCH MG: 120 TABLET, FILM COATED, EXTENDED RELEASE ORAL at 09:00

## 2020-10-10 NOTE — NUR
NSG NOTE; TRANSFER



PT ACCEPTED TO ROOM 648 AT [facility]. REPORT CALLED TO MADELEINE YA CALLED AND UPDATED

## 2020-10-10 NOTE — NUR
NSG NOTE; NON REBREATHER MASK



PLACED ON PT AT 8 L AT 0930 PER GWON RT.  PT CONTINUES TO HAVE O2 SATS IN THE LOW 80'S AND 
HAS BEEN TITRATED UP TO 15 L O2 NONREBREATHER TO KEEP O2 SATS AT 89-91% AT THIS TIME.  DR CISSE NOTIFIED AND MAKING ROUNDS AT THIS TIME

## 2020-10-10 NOTE — NUR
NSG NOTE; DESAT



PT ON O2 6L NC THIS AM AND WAS FOUND TO HAVE AN O2 SAT OF 80%. PT WAS PLACED ON SIMPLE MASK 
AND REQUIRED O2 8L LITER TO RETURN O2 SATS TO 90%.  HOB ELEVATED.



DR CISSE CALLED AND NEW ORDERS OBTAINED

## 2020-10-10 NOTE — DS
DATE OF DISCHARGE:  



HOSPITAL COURSE:  The patient is a 45-year-old  female patient who was

admitted on 10/08 through the Emergency Room to [facility] with a

complaint of headache, generalized malaise, nausea.  She also reported

subjective fever and chills.  Reports she does have history of chronic

migraines, she reports her headache is worse than her normal.  She also has

generalized myalgia, arthralgia.  She denied any exposure to COVID-19.  No known

sick contact.  Did complain of some shortness of air.  She was evaluated in the

Emergency Room and was extensively investigated.  Her lab works were generally

unremarkable except elevated liver enzymes.  Her D-dimer was slightly elevated. 

Her chest x-ray showed that she has mild right mid lung atelectasis and possible

subtle left retrocardiac infiltrate and therefore, the patient was admitted with

possible COVID-19 infection, elevated liver enzymes and possible

community-acquired pneumonia.  She was started on IV antibiotic in the form of

Rocephin and Zithromax, was admitted, she was swabbed for coronavirus and was

kept in droplet precaution for now.  This morning, the patient was noted to be

extremely hypoxic and despite being on 100% nonrebreather mask, her oxygen

saturation was only 88%.  We did do a repeat chest x-ray, which basically showed

that the patient has resolution of her right mid lung atelectasis, infiltrate,

but has a new focal atelectasis or interstitial infiltrate within the left upper

lobe.  Her blood gases showed a pH of 7.39, pCO2 of 41, pO2 of 60, bicarbonate

25, and oxygen saturation was 89% on FiO2 of 50%.  Her white cell count

continued to be on the normal side at 5700 and her D-dimer was slightly elevated

at 1.05.



PHYSICAL EXAMINATION:

GENERAL:  When I saw her, she was clearly tachypneic; however, there was no

pallor, jaundice, cyanosis or thyromegaly.  No jugular venous distention or limb

edema.

VITAL SIGNS:  Her heart rate was 103, blood pressure was 93/68, temperature was

98, respiratory rate was 20, and oxygen saturation was 89% on 15 liters of

oxygen by nonrebreather mask.

HEAD, EYES, EARS, NOSE AND THROAT:  Showed normocephalic, atraumatic.

NECK:  Supple.

HEART:  Showed normal first and second heart sounds with no gallop, rub or

murmur.

CHEST:  Clear to auscultation.  No crepitation or rhonchi.

ABDOMEN:  Distended, soft, nontender.  No guarding or rigidity.  No

organomegaly.  All hernial orifice intact.  Bowel sounds normal.

NEUROLOGIC:  She was awake, alert, responding appropriately.  All cranial nerves

intact.

EXTREMITIES:  She moves extremities without difficulty.  She ambulates normally

without assistance or assistive devices.



LABORATORY DATA:  Her lab work this morning showed a white cell count of 5700,

hemoglobin 13, hematocrit 40, MCV 92, and platelet count 299,000.  Her chemistry

showed a serum sodium 135, potassium 3.7, chloride 99, bicarbonate 29, anion gap

of 7, BUN 16, creatinine 1.3, estimated GFR was 44 mL per minute.  Her glucose

173, calcium was 8.1.  Total bilirubin normal, however, AST, ALT, alkaline

phosphatase are all elevated.  Her total protein 7, albumin was 3.



DISCHARGE MEDICATIONS:  She was discharged and transferred to [facility] to continue on alogliptin 12.5 mg once a day, amitriptyline 75 mg daily,

Trulicity 1.5 mg subcutaneously weekly, duloxetine for Cymbalta 60 mg twice a

day.  She is on Tresiba 22 units subcutaneously at bedtime, lisinopril 5 mg once

a day and verapamil 120 mg twice a day.  She was also discharged on ceftriaxone

1 g IV daily, hydrocodone/APAP 5/325 one tablet every 6 hours, Excedrin Migraine

2 tablets every 6 hours.



FINAL DISCHARGE DIAGNOSES:

1.  Acute hypoxic respiratory failure.

2.  Community-acquired pneumonia.

3.  Questionable coronavirus infection.

4.  Hyponatremia, most likely dilutional due to hyperglycemia secondary to type

2 diabetes mellitus.

5.  Elevated liver enzymes, likely due to nonalcoholic steatohepatitis.

6.  Severe migraine headache.





______________________________

JULIO CISSE MD



DR:  GOKUL/glo  JOB#:  [id number] / [id number]

DD:  10/10/2020 13:41  DT:  10/10/2020 14:33

## 2020-10-10 NOTE — NUR
PT WAS IN BED APON ASSESSMENT AND MEDICATION ADMINISTRATION. PT HAD COMPLAINTS OF BEING SOB 
AND HAVING A MIGRAINE. PT RECEIVED LORTAB FOR PAIN AND FEVER ALSO INCREASED HER 0XYGEN TO 
3.5 L/MIN NASAL CANNULA. PT WAS ABLE TO SLEEP MOST THE NIGHT WITH NO COMPLAINTS AFTER LORTAB 
ADMINISTRATION. PT IS CURRENTLY RESTING IN BED. WILL CONTINUE TO MONITOR.

## 2020-10-10 NOTE — NUR
NSG NOTE; DISCHARGED TO [facility]



PAPER COPY OF CHART SENT WITH PT



DISCHARGED AT 1445 VIA CART ACCOMP BY EMS FOR TRANSFER TO [facility]

## 2020-10-10 NOTE — RAD
EXAM: Chest, single view.

 

HISTORY: Oxygen saturation.

 

COMPARISON: 10/8/2020

 

FINDINGS: A frontal view of the chest is obtained. There has been interval

resolution of previously demonstrated right mid lung atelectasis or 

infiltrate. There is new linear atelectasis or infiltrate within the left 

upper lobe. There is no consolidation, pleural effusion or pneumothorax. 

The heart is normal in size.

 

IMPRESSION: 

1. Resolution of right midlung atelectasis or infiltrate.

2. New focal atelectasis or interstitial infiltrate within the left upper 

lobe.

 

Electronically signed by: Jacqueline Catalan MD (10/10/2020 9:05 AM) [id number]

## 2020-10-11 VITALS — SYSTOLIC BLOOD PRESSURE: 97 MMHG | DIASTOLIC BLOOD PRESSURE: 48 MMHG

## 2020-10-11 VITALS — SYSTOLIC BLOOD PRESSURE: 94 MMHG | DIASTOLIC BLOOD PRESSURE: 41 MMHG

## 2020-10-11 VITALS — DIASTOLIC BLOOD PRESSURE: 48 MMHG | SYSTOLIC BLOOD PRESSURE: 109 MMHG

## 2020-10-11 VITALS — SYSTOLIC BLOOD PRESSURE: 105 MMHG | DIASTOLIC BLOOD PRESSURE: 53 MMHG

## 2020-10-11 VITALS — SYSTOLIC BLOOD PRESSURE: 91 MMHG | DIASTOLIC BLOOD PRESSURE: 41 MMHG

## 2020-10-11 VITALS — DIASTOLIC BLOOD PRESSURE: 51 MMHG | SYSTOLIC BLOOD PRESSURE: 83 MMHG

## 2020-10-11 VITALS — SYSTOLIC BLOOD PRESSURE: 109 MMHG | DIASTOLIC BLOOD PRESSURE: 48 MMHG

## 2020-10-11 VITALS — DIASTOLIC BLOOD PRESSURE: 48 MMHG | SYSTOLIC BLOOD PRESSURE: 97 MMHG

## 2020-10-11 VITALS — SYSTOLIC BLOOD PRESSURE: 11 MMHG | DIASTOLIC BLOOD PRESSURE: 66 MMHG

## 2020-10-11 VITALS — SYSTOLIC BLOOD PRESSURE: 117 MMHG | DIASTOLIC BLOOD PRESSURE: 58 MMHG

## 2020-10-11 VITALS — DIASTOLIC BLOOD PRESSURE: 49 MMHG | SYSTOLIC BLOOD PRESSURE: 86 MMHG

## 2020-10-11 VITALS — DIASTOLIC BLOOD PRESSURE: 57 MMHG | SYSTOLIC BLOOD PRESSURE: 105 MMHG

## 2020-10-11 LAB
% BANDS: 4 % (ref 0–9)
% LYMPHS: 11 % (ref 24–48)
% MONOS: 2 % (ref 0–10)
% SEGS: 83 % (ref 35–66)
ALBUMIN SERPL-MCNC: 2.6 G/DL (ref 3.4–5)
ALBUMIN/GLOB SERPL: 0.6 {RATIO} (ref 1–1.7)
ALP SERPL-CCNC: 164 U/L (ref 46–116)
ALT SERPL-CCNC: 210 U/L (ref 14–59)
ANION GAP SERPL CALC-SCNC: 6 MMOL/L (ref 6–14)
AST SERPL-CCNC: 285 U/L (ref 15–37)
BASOPHILS # BLD AUTO: 0 X10^3/UL (ref 0–0.2)
BASOPHILS NFR BLD: 0 % (ref 0–3)
BILIRUB SERPL-MCNC: 0.3 MG/DL (ref 0.2–1)
BUN SERPL-MCNC: 20 MG/DL (ref 7–20)
BUN/CREAT SERPL: 17 (ref 6–20)
CALCIUM SERPL-MCNC: 8.2 MG/DL (ref 8.5–10.1)
CHLORIDE SERPL-SCNC: 99 MMOL/L (ref 98–107)
CO2 SERPL-SCNC: 29 MMOL/L (ref 21–32)
CREAT SERPL-MCNC: 1.2 MG/DL (ref 0.6–1)
CRP SERPL-MCNC: 161.6 MG/L (ref 0–3.3)
EOSINOPHIL NFR BLD: 0 % (ref 0–3)
EOSINOPHIL NFR BLD: 0 X10^3/UL (ref 0–0.7)
ERYTHROCYTE [DISTWIDTH] IN BLOOD BY AUTOMATED COUNT: 14.3 % (ref 11.5–14.5)
GFR SERPLBLD BASED ON 1.73 SQ M-ARVRAT: 48.8 ML/MIN
GLUCOSE SERPL-MCNC: 351 MG/DL (ref 70–99)
HCT VFR BLD CALC: 39 % (ref 36–47)
HGB BLD-MCNC: 12.7 G/DL (ref 12–15.5)
LYMPHOCYTES # BLD: 0.5 X10^3/UL (ref 1–4.8)
LYMPHOCYTES NFR BLD AUTO: 12 % (ref 24–48)
MCH RBC QN AUTO: 30 PG (ref 25–35)
MCHC RBC AUTO-ENTMCNC: 33 G/DL (ref 31–37)
MCV RBC AUTO: 91 FL (ref 79–100)
MONO #: 0.1 X10^3/UL (ref 0–1.1)
MONOCYTES NFR BLD: 3 % (ref 0–9)
NEUT #: 3.7 X10^3/UL (ref 1.8–7.7)
NEUTROPHILS NFR BLD AUTO: 86 % (ref 31–73)
PLATELET # BLD AUTO: 183 X10^3/UL (ref 140–400)
PLATELET # BLD EST: ADEQUATE 10*3/UL
POTASSIUM SERPL-SCNC: 5 MMOL/L (ref 3.5–5.1)
PROT SERPL-MCNC: 6.7 G/DL (ref 6.4–8.2)
RBC # BLD AUTO: 4.27 X10^6/UL (ref 3.5–5.4)
SODIUM SERPL-SCNC: 134 MMOL/L (ref 136–145)
WBC # BLD AUTO: 4.3 X10^3/UL (ref 4–11)

## 2020-10-11 PROCEDURE — XW13325 TRANSFUSION OF CONVALESCENT PLASMA (NONAUTOLOGOUS) INTO PERIPHERAL VEIN, PERCUTANEOUS APPROACH, NEW TECHNOLOGY GROUP 5: ICD-10-PCS

## 2020-10-11 PROCEDURE — XW033E5 INTRODUCTION OF REMDESIVIR ANTI-INFECTIVE INTO PERIPHERAL VEIN, PERCUTANEOUS APPROACH, NEW TECHNOLOGY GROUP 5: ICD-10-PCS

## 2020-10-11 RX ADMIN — ONDANSETRON PRN MG: 2 INJECTION INTRAMUSCULAR; INTRAVENOUS at 03:31

## 2020-10-11 RX ADMIN — INSULIN LISPRO SCH UNITS: 100 INJECTION, SOLUTION INTRAVENOUS; SUBCUTANEOUS at 12:11

## 2020-10-11 RX ADMIN — ENOXAPARIN SODIUM SCH MG: 40 INJECTION SUBCUTANEOUS at 21:11

## 2020-10-11 RX ADMIN — LINAGLIPTIN SCH MG: 5 TABLET, FILM COATED ORAL at 09:13

## 2020-10-11 RX ADMIN — INSULIN LISPRO SCH UNITS: 100 INJECTION, SOLUTION INTRAVENOUS; SUBCUTANEOUS at 18:26

## 2020-10-11 RX ADMIN — METHYLPREDNISOLONE SODIUM SUCCINATE SCH MG: 40 INJECTION, POWDER, FOR SOLUTION INTRAMUSCULAR; INTRAVENOUS at 21:13

## 2020-10-11 RX ADMIN — HYDROCODONE BITARTRATE AND ACETAMINOPHEN PRN TAB: 5; 325 TABLET ORAL at 21:12

## 2020-10-11 RX ADMIN — HYDROCODONE BITARTRATE AND ACETAMINOPHEN PRN TAB: 5; 325 TABLET ORAL at 14:56

## 2020-10-11 RX ADMIN — METHYLPREDNISOLONE SODIUM SUCCINATE SCH MG: 40 INJECTION, POWDER, FOR SOLUTION INTRAMUSCULAR; INTRAVENOUS at 05:49

## 2020-10-11 RX ADMIN — HYDROCODONE BITARTRATE AND ACETAMINOPHEN PRN TAB: 5; 325 TABLET ORAL at 03:31

## 2020-10-11 RX ADMIN — CEFTRIAXONE SCH GM: 1 INJECTION, POWDER, FOR SOLUTION INTRAMUSCULAR; INTRAVENOUS at 10:56

## 2020-10-11 RX ADMIN — INSULIN GLARGINE SCH UNIT: 100 INJECTION, SOLUTION SUBCUTANEOUS at 21:12

## 2020-10-11 RX ADMIN — VERAPAMIL HYDROCHLORIDE SCH MG: 120 TABLET, FILM COATED, EXTENDED RELEASE ORAL at 09:00

## 2020-10-11 RX ADMIN — ONDANSETRON PRN MG: 2 INJECTION INTRAMUSCULAR; INTRAVENOUS at 11:23

## 2020-10-11 RX ADMIN — HYDROCODONE BITARTRATE AND ACETAMINOPHEN PRN TAB: 5; 325 TABLET ORAL at 09:14

## 2020-10-11 RX ADMIN — INSULIN LISPRO SCH UNITS: 100 INJECTION, SOLUTION INTRAVENOUS; SUBCUTANEOUS at 18:24

## 2020-10-11 RX ADMIN — METHYLPREDNISOLONE SODIUM SUCCINATE SCH MG: 40 INJECTION, POWDER, FOR SOLUTION INTRAMUSCULAR; INTRAVENOUS at 14:54

## 2020-10-11 RX ADMIN — LISINOPRIL SCH MG: 5 TABLET ORAL at 09:00

## 2020-10-11 RX ADMIN — VERAPAMIL HYDROCHLORIDE SCH MG: 120 TABLET, FILM COATED, EXTENDED RELEASE ORAL at 21:12

## 2020-10-11 NOTE — CONS
DATE OF CONSULTATION:  10/11/2020



REFERRING PHYSICIAN:  Dr. Becerril.



REASON FOR CONSULTATION:  COVID-19 infection.



HISTORY OF PRESENT ILLNESS:  A 45-year-old female who presented to [facility] with complaints of myalgia, headache, not improving with local care,

worsening with shortness of breath.  This started last Monday.  She was tested

for COVID-19.  She was hypoxic, she is requiring O2.  [facility] and [facility].  The patient

denies any fever, still has shortness of breath, requiring nonrebreather mask 15

liters.  COVID done at [facility] and has been reported positive per

staff.  ID consult has been requested for antibiotic management.  The patient's

white count was 4.3, hemoglobin 12.7, platelets 183, lymphocytes of 12. 

Creatinine was 1.2, , , alk phos 164.  C-reactive protein 161. 

Albumin 2.6.  D-dimer was 1.96.



PAST MEDICAL HISTORY:  Diabetes, migraine headache, fibromyalgia.



ALLERGIES:  MORPHINE AND DEMEROL.



FAMILY HISTORY:  As per HPI.



SOCIAL HISTORY:  Denies smoking, ETOH, or illicit drug use.  Lives with her

 and 15-year-old daughter. Works from home



PAST SURGICAL HISTORY:  Laparoscopic tubal ligation, cholecystectomy.



CURRENT MEDICATION:  Insulin, ceftriaxone, methylprednisolone, verapamil,

duloxetine, lisinopril, linagliptin, Zofran, ketorolac, hydrocodone,

amitriptyline.



PHYSICAL EXAMINATION:

Vitals noted

GENERAL:  Alert, oriented x 3, slightly anxious female in no acute distress, on

nonrebreather.

HEENT:  Normocephalic, atraumatic.  Anicteric.

NECK:  Supple.  No thyromegaly.

LUNGS:  Coarse breath sounds.  No wheezing.

HEART:  S1, S2, no murmurs.

ABDOMEN:  Obese, soft, nontender, bowel sounds present.

EXTREMITIES:  No edema, no cyanosis.

DERMATOLOGIC:  Warm, dry.  No generalized rash.  Multiple tattoos.

CENTRAL NERVOUS SYSTEM:  Alert and oriented x 3, grossly nonfocal.

PSYCHIATRIC:  Slightly anxious, but calm and cooperative.



IMAGING:  None here.



LABORATORY DATA:  WBC 4.3, hemoglobin 12.7, hematocrit 39, platelets 183,

lymphocytes 12.  Sodium 134, potassium 5.0, chloride 99, bicarb 29, BUN 20,

creatinine 1.2, glucose 351, calcium 8.2, total protein 8.3, , ,

alk phos 164.  C-reactive protein 161.6, total protein 6.7, albumin 2.6.



IMPRESSION:

1.  Acute hypoxic respiratory failure.

2.  COVID-19 Pneumonia

3.  Migraine headaches.

4.  Diabetes.

5.  Obesity.

6.  Fibromyalgia.

7.  Abnormal LFTs.



RECOMMENDATIONS:

1.  Continue supportive care.

2.  Discussed about convalescent plasma treatment.  The patient is

agreeable with the plan.

3.  Discussed about remdesivir investigational therapy.  The patient is

agreeable with the plan.

4.  Follow up labs and cultures.

5.  Continue ceftriaxone.

6.  On steroids.

7.  Pulmonary is consulted.

   Discussed with RN.



Thank you for allowing me to participate in this patient's care.  If you have

any questions, do not hesitate to contact me.

 



______________________________

SHYAM BRAND MD



DR:  SAMI/glo  JOB#:  [id number] / [id number]

DD:  10/11/2020 10:52  DT:  10/11/2020 11:53

TIMOTHY

## 2020-10-11 NOTE — PDOC
PULMONARY PROGRESS NOTES


DATE: 10/11/20 


TIME: 12:52


Vitals





Vital Signs








  Date Time  Temp Pulse Resp B/P (MAP) Pulse Ox O2 Delivery O2 Flow Rate FiO2


 


10/11/20 11:15 98.7 79 18 83/51 (62) 89 NonRebreather Mask 15.0 





 98.7       








Labs





Laboratory Tests








Test


 10/11/20


03:00 10/11/20


03:30


 


Sodium Level


 134 mmol/L


(136-145) 





 


Potassium Level


 5.0 mmol/L


(3.5-5.1) 





 


Chloride Level


 99 mmol/L


() 





 


Carbon Dioxide Level


 29 mmol/L


(21-32) 





 


Anion Gap 6 (6-14)  


 


Blood Urea Nitrogen


 20 mg/dL


(7-20) 





 


Creatinine


 1.2 mg/dL


(0.6-1.0) 





 


Estimated GFR


(Cockcroft-Gault) 48.8 


 





 


BUN/Creatinine Ratio 17 (6-20)  


 


Glucose Level


 351 mg/dL


(70-99) 





 


Calcium Level


 8.2 mg/dL


(8.5-10.1) 





 


Total Bilirubin


 0.3 mg/dL


(0.2-1.0) 





 


Aspartate Amino Transf


(AST/SGOT) 285 U/L


(15-37) 





 


Alanine Aminotransferase


(ALT/SGPT) 210 U/L


(14-59) 





 


Alkaline Phosphatase


 164 U/L


() 





 


C-Reactive Protein,


Quantitative 161.6 mg/L


(0-3.3) 





 


Total Protein


 6.7 g/dL


(6.4-8.2) 





 


Albumin


 2.6 g/dL


(3.4-5.0) 





 


Albumin/Globulin Ratio 0.6 (1.0-1.7)  


 


White Blood Count


 


 4.3 x10^3/uL


(4.0-11.0)


 


Red Blood Count


 


 4.27 x10^6/uL


(3.50-5.40)


 


Hemoglobin


 


 12.7 g/dL


(12.0-15.5)


 


Hematocrit


 


 39.0 %


(36.0-47.0)


 


Mean Corpuscular Volume  91 fL () 


 


Mean Corpuscular Hemoglobin  30 pg (25-35) 


 


Mean Corpuscular Hemoglobin


Concent 


 33 g/dL


(31-37)


 


Red Cell Distribution Width


 


 14.3 %


(11.5-14.5)


 


Platelet Count


 


 183 x10^3/uL


(140-400)


 


Neutrophils (%) (Auto)  86 % (31-73) 


 


Lymphocytes (%) (Auto)  12 % (24-48) 


 


Monocytes (%) (Auto)  3 % (0-9) 


 


Eosinophils (%) (Auto)  0 % (0-3) 


 


Basophils (%) (Auto)  0 % (0-3) 


 


Neutrophils # (Auto)


 


 3.7 x10^3/uL


(1.8-7.7)


 


Lymphocytes # (Auto)


 


 0.5 x10^3/uL


(1.0-4.8)


 


Monocytes # (Auto)


 


 0.1 x10^3/uL


(0.0-1.1)


 


Eosinophils # (Auto)


 


 0.0 x10^3/uL


(0.0-0.7)


 


Basophils # (Auto)


 


 0.0 x10^3/uL


(0.0-0.2)


 


Segmented Neutrophils %  83 % (35-66) 


 


Band Neutrophils %  4 % (0-9) 


 


Lymphocytes %  11 % (24-48) 


 


Monocytes %  2 % (0-10) 


 


Platelet Estimate


 


 Adequate


(ADEQUATE)


 


D-Dimer (Jada)


 


 1.96 ug/mlFEU


(0.00-0.50)








Laboratory Tests








Test


 10/11/20


03:00 10/11/20


03:30


 


Sodium Level


 134 mmol/L


(136-145) 





 


Potassium Level


 5.0 mmol/L


(3.5-5.1) 





 


Chloride Level


 99 mmol/L


() 





 


Carbon Dioxide Level


 29 mmol/L


(21-32) 





 


Anion Gap 6 (6-14)  


 


Blood Urea Nitrogen


 20 mg/dL


(7-20) 





 


Creatinine


 1.2 mg/dL


(0.6-1.0) 





 


Estimated GFR


(Cockcroft-Gault) 48.8 


 





 


BUN/Creatinine Ratio 17 (6-20)  


 


Glucose Level


 351 mg/dL


(70-99) 





 


Calcium Level


 8.2 mg/dL


(8.5-10.1) 





 


Total Bilirubin


 0.3 mg/dL


(0.2-1.0) 





 


Aspartate Amino Transf


(AST/SGOT) 285 U/L


(15-37) 





 


Alanine Aminotransferase


(ALT/SGPT) 210 U/L


(14-59) 





 


Alkaline Phosphatase


 164 U/L


() 





 


C-Reactive Protein,


Quantitative 161.6 mg/L


(0-3.3) 





 


Total Protein


 6.7 g/dL


(6.4-8.2) 





 


Albumin


 2.6 g/dL


(3.4-5.0) 





 


Albumin/Globulin Ratio 0.6 (1.0-1.7)  


 


White Blood Count


 


 4.3 x10^3/uL


(4.0-11.0)


 


Red Blood Count


 


 4.27 x10^6/uL


(3.50-5.40)


 


Hemoglobin


 


 12.7 g/dL


(12.0-15.5)


 


Hematocrit


 


 39.0 %


(36.0-47.0)


 


Mean Corpuscular Volume  91 fL () 


 


Mean Corpuscular Hemoglobin  30 pg (25-35) 


 


Mean Corpuscular Hemoglobin


Concent 


 33 g/dL


(31-37)


 


Red Cell Distribution Width


 


 14.3 %


(11.5-14.5)


 


Platelet Count


 


 183 x10^3/uL


(140-400)


 


Neutrophils (%) (Auto)  86 % (31-73) 


 


Lymphocytes (%) (Auto)  12 % (24-48) 


 


Monocytes (%) (Auto)  3 % (0-9) 


 


Eosinophils (%) (Auto)  0 % (0-3) 


 


Basophils (%) (Auto)  0 % (0-3) 


 


Neutrophils # (Auto)


 


 3.7 x10^3/uL


(1.8-7.7)


 


Lymphocytes # (Auto)


 


 0.5 x10^3/uL


(1.0-4.8)


 


Monocytes # (Auto)


 


 0.1 x10^3/uL


(0.0-1.1)


 


Eosinophils # (Auto)


 


 0.0 x10^3/uL


(0.0-0.7)


 


Basophils # (Auto)


 


 0.0 x10^3/uL


(0.0-0.2)


 


Segmented Neutrophils %  83 % (35-66) 


 


Band Neutrophils %  4 % (0-9) 


 


Lymphocytes %  11 % (24-48) 


 


Monocytes %  2 % (0-10) 


 


Platelet Estimate


 


 Adequate


(ADEQUATE)


 


D-Dimer (Jada)


 


 1.96 ug/mlFEU


(0.00-0.50)








Medications





Active Scripts








 Medications  Dose


 Route/Sig


 Max Daily Dose Days Date Category


 


 Cymbalta


  (Duloxetine Hcl)


 20 Mg Capsule.dr  20 Mg


 PO DAILY


   9/22/20 Reported


 


 Lisinopril 5 Mg


 Tablet  5 Mg


 PO DAILY


   9/22/20 Reported


 


 Amitriptyline Hcl


 10 Mg Tablet  10 Mg


 PO DAILY


   9/22/20 Reported


 


 Trulicity


  (Dulaglutide) 1.5


 Mg/0.5 Ml


 Pen.injctr  1.5 Mg


 SQ


   9/22/20 Reported


 


 Nesina


  (Alogliptin


 Benzoate) 12.5 Mg


 Tablet  12.5 Mg


 PO


   9/22/20 Reported


 


 Propranolol Hcl


 80 Mg Tablet  80 Mg


 PO DAILY


   5/14/14 Reported


 


 Cymbalta


  (Duloxetine Hcl)


 60 Mg Capsule.dr  60 Mg


 PO DAILY


   5/14/14 Reported











Impression


.


Full consult dictated COVID-19 hypoxemic respiratory failure











LEWIS DICK MD              Oct 11, 2020 12:53

## 2020-10-11 NOTE — CONS
DATE OF CONSULTATION:  10/11/2020



ATTENDING PHYSICIAN:  Dr. Becerril.



CONSULTING PHYSICIAN:  Dr. Dick.



REASON FOR CONSULTATION:  The patient is seen in pulmonary consultation at the

request of Dr. Becerril for acute hypoxemic respiratory failure, COVID-19 viral

pneumonia.



HISTORY OF PRESENT ILLNESS:  The patient is a 45-year-old female who presented

to the Emergency Room at [facility] complaining of headache, generalized

malaise, nausea, muscle aches and pains.  She reported some subjective fever. 

No increasing cough.  She did have a headache that was mostly normal.  The

patient tested positive for COVID-19.  I was called with her clinical

presentation yesterday and started her on remdesivir and convalescent serum. 

She is currently requiring 15 liters of oxygen supplementation.  She does not

appear to be severely sick or in respiratory distress.  She denies smoking.  No

prior history of asthma or COPD.



PAST MEDICAL HISTORY:  Migraine headaches, type 2 diabetes, morbid obesity,

hypertension, nephrolithiasis, nonalcoholic steatohepatitis and obstructive

sleep apnea.



PAST SURGICAL HISTORY:  Status post cholecystectomy, total abdominal

hysterectomy.



ALLERGIES:  MORPHINE AND DEMEROL.



REVIEW OF SYSTEMS:

CONSTITUTIONAL:  As indicated above.

HEENT:  No nasal congestion or sore throat.

PULMONARY:  As indicated above.

CARDIOVASCULAR:  No chest pain.  No pressure.

GASTROINTESTINAL:  Some nausea, no vomiting.

GENITOURINARY:  No dysuria or frequency.

MUSCULOSKELETAL:  As indicated above.

GASTROINTESTINAL:  No dysuria or frequency.

SKIN:  No new skin rashes.

NEUROLOGIC:  No headaches, diplopia or blurred vision..



SOCIAL HISTORY:  She2 denies smoking.  She has 3 daughters, lives with her

 and son.  Works at the [employer] for [employer] service.



PHYSICAL EXAMINATION:

VITAL SIGNS:  Since admission, she has been afebrile.  She is currently on 15

liters of oxygen supplementation.

HEENT:  Eyes, the sclerae were nonicteric.

NECK:  Jugular venous distention was not elevated.  No lymphadenopathy.

CHEST:  Full expansion.

LUNGS:  Adequate flow with no wheezes.

CARDIOVASCULAR:  Regular rate and rhythm with S1, S2, no S3.

ABDOMEN:  Soft, nontender, nondistended.

EXTREMITIES:  No clubbing, cyanosis or edema.

NEUROLOGICAL:  The patient was awake, alert, following commands.  A detailed

neuro exam was not performed.



LABORATORY DATA:  White count was normal.  She has lymphopenia.  Sodium was low.

 D-dimer was 1.96.  Chest x-ray revealed bilateral pulmonary infiltrates.



IMPRESSION:

1.  Acute hypoxemic respiratory failure.

2.  COVID-19 viral pneumonia.

3.  Abnormal chest x-ray, possible gram-positive, gram-negative pneumonia.

4.  Headaches.

5.  Type 2 diabetes.

6.  Fibromyalgia.

7.  Morbid obesity.

8.  Nonalcoholic steatohepatitis.

9.  Obstructive sleep apnea.



PLAN:

1.  Continue current support with steroids.

2.  The patient initiated on remdesivir.

3.  Status post convalescent serum.

4.  The patient is currently on full dose Lovenox, I recommend decreasing to 40

twice daily, suspect D-dimer related to COVID-19.

 



______________________________

LEWIS DICK MD



DR:  YUNI/glo  JOB#:  [id number] / [id number]

DD:  10/11/2020 12:53  DT:  10/11/2020 13:45

## 2020-10-11 NOTE — HP
ADMIT DATE:  10/10/2020



HISTORY OF PRESENT ILLNESS:  The patient is a 45-year-old  female who

was admitted originally to [facility] Emergency Room on 10/08/2020 with

a complaint of headache, generalized malaise, and nausea.  The patient also

reports subjective fever and chills.  She reports she does have a history of

chronic migraines, but reports her headache is worse than her normal.  She also

has generalized myalgia, arthralgia.  She denies exposure to COVID-19.  No known

sick contact.  Did complain of some shortness of air.  She was evaluated in the

Emergency Room and was extensively evaluated.  She has had a lab work, which was

generally unremarkable except for elevated liver enzymes.  Her D-dimer was

slightly elevated to 1.05.  Urinalysis showed more than 40 wbc's, the urine was

yellow, turbid, and showed many bacteria.  She also had a chest x-ray, which

basically showed that the patient has right mid lung atelectasis and possible

subtle left retrocardiac infiltrate, and therefore, the patient was admitted for

possible COVID-19 infection, elevated liver enzymes, and possible

community-acquired pneumonia.  She was started on IV antibiotic in the form of

Rocephin and Zithromax.  Her oxygen requirement has continued to worsen such

that yesterday afternoon, she was requiring 15 liters of oxygen by nonrebreather

mask and only maintaining her oxygen saturation to 88% and her repeat chest

x-ray showed the patient has resolution of her right mid lung atelectasis;

however, infiltrates have shown up now in the left upper lobe.  Her blood gases

showed hypoxemia, and therefore, a decision was made to transfer her to

[facility] for further evaluation and to consult the

pulmonologist as well as Infectious Disease.  At the time of the transfer, her

COVID-19 test was still ____; however, this morning, the test came back

positive.



PAST MEDICAL HISTORY:  Significant for  migraine headache, type 2 diabetes

mellitus, fibromyalgia, hypertension, nephrolithiasis, nonalcoholic

steatohepatitis, and obstructive sleep apnea.



PAST SURGICAL HISTORY:  Significant for cholecystectomy, total abdominal

hysterectomy.  She did have also colonoscopy and spontaneous pneumothorax that

required chest tube placement when she was a student at high school.



ALLERGIES:  She is allergic to MORPHINE and DEMEROL.



MEDICATIONS:  Her home medications include the following:  She was on verapamil

120 mg twice a day, lisinopril 5 mg once a day, amitriptyline 75 mg once a day,

duloxetine 60 mg twice a day, Nesina or alogliptin benzoate 12.5 mg once a day,

Trulicity 1.5 mg subcutaneous once a week, and she is on Tresiba FlexTouch 22

units at bedtime.



FAMILY HISTORY:  She has 2 brothers who are younger and healthy.  Her younger

sister  at age 34 from cancer.  Her father is still alive, but does not

really keep in touch with him.  Her mother is still alive at the age of 64 and

was diagnosed with colon cancer.



SOCIAL HISTORY:  She is , has 3 daughters and 1 son.  She never smoked,

does not drink alcohol or use recreational drugs.  She works from home for [employer].



REVIEW OF SYSTEMS:  The patient continued to have headache, although much better

than before.  Denied any blurring of vision, cataract, glaucoma, or macular

degeneration.  Denied any earache, tinnitus, or sensorineural deafness.  Denied

any nosebleeds, stuffy nose, or postnasal drip.  Denied any sore throat, sore

tongue, toothache, hoarseness of voice, or difficulty swallowing.  Denied any

nausea, vomiting, diarrhea, or constipation.  Denied any dysuria, frequency, or

hematuria.



PHYSICAL EXAMINATION:

GENERAL:  When I saw her today, she looked well and was clearly in no apparent

respiratory distress.  No pallor, jaundice, cyanosis, or thyromegaly.  No

jugular venous distention.  No limb edema.

VITAL SIGNS:  Her heart rate was 80, blood pressure was 91/41, temperature was

98.2, respiratory rate was 20, and oxygen saturation was 88% on 15 liters by

nonrebreather mask.

HEAD, EYES, EARS, NOSE, AND THROAT:  Showed normocephalic, atraumatic.

NECK:  Supple.

HEART:  Showed normal first and second heart sounds.  No gallop or murmur.

CHEST:  Shows central trachea, equal bilateral expansion, air entry, vesicular

sounds.  She has bilateral basal crepitation.  I could not appreciate any

rhonchi.

ABDOMEN:  Distended, soft, nontender.

NEUROLOGIC:  She was awake, alert, responding appropriately.  All cranial nerves

intact.

EXTREMITIES:  She moves extremities without difficulty.  She ambulates without

assistance of assistive devices.



LABORATORY DATA:  Her white cell count was 4300, hemoglobin 12.7, hematocrit 39,

MCV 91, and platelet count of 183,000 with normal manual differential.  Her

chemistry showed a serum sodium 134, potassium 5, chloride 99, bicarbonate 29,

anion gap of 6, BUN 20, creatinine 1.2, and estimated GFR was 49 mL per minute. 

Her glucose was 351, calcium was 8.2.  Total bilirubin normal; however, AST,

ALT, alkaline phosphatase has risen dramatically.  Her C-reactive protein has

risen to 161 mg/dL, total protein 6.7, and albumin was 2.6.  Her D-dimer is up

to 1.96.  Her D-dimer at [facility] was only 1.05 and her C-reactive protein was

only 45 mg/dL, has dramatically risen.



ASSESSMENT AND PLAN:  In summary, this is a COVID-19 pneumonia, acute hypoxic

respiratory failure.  Other problems include migraine headache, diabetes

mellitus, fibromyalgia, hypertension, nephrolithiasis, nonalcoholic

steatohepatitis, and obstructive sleep apnea.  Plan is to obviously continue

with IV ceftriaxone and Zithromax.  I did start her also on Solu-Medrol 40 mg IV

every 8 hours.  We will also consult the pulmonologist and Infectious Disease to

start her on remdesivir as well as convalescent plasma.

 



______________________________

JULIO CISSE MD



DR:  GOKUL/glo  JOB#:  [id number] / [id number]

DD:  10/11/2020 10:09  DT:  10/11/2020 10:45

## 2020-10-12 VITALS — SYSTOLIC BLOOD PRESSURE: 106 MMHG | DIASTOLIC BLOOD PRESSURE: 66 MMHG

## 2020-10-12 VITALS — SYSTOLIC BLOOD PRESSURE: 121 MMHG | DIASTOLIC BLOOD PRESSURE: 72 MMHG

## 2020-10-12 VITALS — SYSTOLIC BLOOD PRESSURE: 114 MMHG | DIASTOLIC BLOOD PRESSURE: 48 MMHG

## 2020-10-12 VITALS — DIASTOLIC BLOOD PRESSURE: 57 MMHG | SYSTOLIC BLOOD PRESSURE: 91 MMHG

## 2020-10-12 VITALS — SYSTOLIC BLOOD PRESSURE: 106 MMHG | DIASTOLIC BLOOD PRESSURE: 53 MMHG

## 2020-10-12 VITALS — SYSTOLIC BLOOD PRESSURE: 148 MMHG | DIASTOLIC BLOOD PRESSURE: 74 MMHG

## 2020-10-12 VITALS — SYSTOLIC BLOOD PRESSURE: 122 MMHG | DIASTOLIC BLOOD PRESSURE: 74 MMHG

## 2020-10-12 VITALS — DIASTOLIC BLOOD PRESSURE: 64 MMHG | SYSTOLIC BLOOD PRESSURE: 113 MMHG

## 2020-10-12 VITALS — SYSTOLIC BLOOD PRESSURE: 104 MMHG | DIASTOLIC BLOOD PRESSURE: 66 MMHG

## 2020-10-12 VITALS — DIASTOLIC BLOOD PRESSURE: 66 MMHG | SYSTOLIC BLOOD PRESSURE: 104 MMHG

## 2020-10-12 VITALS — DIASTOLIC BLOOD PRESSURE: 65 MMHG | SYSTOLIC BLOOD PRESSURE: 102 MMHG

## 2020-10-12 VITALS — SYSTOLIC BLOOD PRESSURE: 111 MMHG | DIASTOLIC BLOOD PRESSURE: 68 MMHG

## 2020-10-12 LAB
ALBUMIN SERPL-MCNC: 2.5 G/DL (ref 3.4–5)
ALBUMIN/GLOB SERPL: 0.6 {RATIO} (ref 1–1.7)
ALP SERPL-CCNC: 138 U/L (ref 46–116)
ALT SERPL-CCNC: 129 U/L (ref 14–59)
ANION GAP SERPL CALC-SCNC: 6 MMOL/L (ref 6–14)
AST SERPL-CCNC: 89 U/L (ref 15–37)
BASE EXCESS STD BLDA CALC-SCNC: 0 MMOL/L (ref -3–3)
BASE EXCESS STD BLDA CALC-SCNC: 4 MMOL/L (ref -3–3)
BILIRUB SERPL-MCNC: 0.2 MG/DL (ref 0.2–1)
BUN SERPL-MCNC: 17 MG/DL (ref 7–20)
BUN/CREAT SERPL: 19 (ref 6–20)
CALCIUM SERPL-MCNC: 8 MG/DL (ref 8.5–10.1)
CHLORIDE SERPL-SCNC: 101 MMOL/L (ref 98–107)
CO2 SERPL-SCNC: 29 MMOL/L (ref 21–32)
CREAT SERPL-MCNC: 0.9 MG/DL (ref 0.6–1)
ERYTHROCYTE [DISTWIDTH] IN BLOOD BY AUTOMATED COUNT: 14 % (ref 11.5–14.5)
GFR SERPLBLD BASED ON 1.73 SQ M-ARVRAT: 68 ML/MIN
GLUCOSE SERPL-MCNC: 271 MG/DL (ref 70–99)
HCO3 BLDA-SCNC: 22 MMOL/L (ref 21–28)
HCO3 BLDA-SCNC: 29 MMOL/L (ref 21–28)
HCT VFR BLD CALC: 37.7 % (ref 36–47)
HGB BLD-MCNC: 12.5 G/DL (ref 12–15.5)
MCH RBC QN AUTO: 30 PG (ref 25–35)
MCHC RBC AUTO-ENTMCNC: 33 G/DL (ref 31–37)
MCV RBC AUTO: 90 FL (ref 79–100)
METHGB MFR BLD: 0.3 % (ref 0–1.9)
METHGB MFR BLD: 0.3 % (ref 0–1.9)
OXYHGB MFR BLD: 79.4 %
OXYHGB MFR BLD: 87.3 %
PCO2 BLDA: 28 MMHG (ref 35–46)
PCO2 BLDA: 47 MMHG (ref 35–46)
PLATELET # BLD AUTO: 193 X10^3/UL (ref 140–400)
PO2 BLDA: 43 MMHG (ref 75–108)
PO2 BLDA: 49 MMHG (ref 75–108)
POTASSIUM SERPL-SCNC: 4.4 MMOL/L (ref 3.5–5.1)
PROT SERPL-MCNC: 6.5 G/DL (ref 6.4–8.2)
RBC # BLD AUTO: 4.21 X10^6/UL (ref 3.5–5.4)
SAO2 % BLDA: 80 % (ref 92–99)
SAO2 % BLDA: 88 % (ref 92–99)
SODIUM SERPL-SCNC: 136 MMOL/L (ref 136–145)
WBC # BLD AUTO: 8.2 X10^3/UL (ref 4–11)

## 2020-10-12 PROCEDURE — 0BH17EZ INSERTION OF ENDOTRACHEAL AIRWAY INTO TRACHEA, VIA NATURAL OR ARTIFICIAL OPENING: ICD-10-PCS | Performed by: INTERNAL MEDICINE

## 2020-10-12 PROCEDURE — 5A1955Z RESPIRATORY VENTILATION, GREATER THAN 96 CONSECUTIVE HOURS: ICD-10-PCS | Performed by: INTERNAL MEDICINE

## 2020-10-12 RX ADMIN — INSULIN LISPRO SCH UNITS: 100 INJECTION, SOLUTION INTRAVENOUS; SUBCUTANEOUS at 11:48

## 2020-10-12 RX ADMIN — ENOXAPARIN SODIUM SCH MG: 40 INJECTION SUBCUTANEOUS at 20:49

## 2020-10-12 RX ADMIN — LISINOPRIL SCH MG: 5 TABLET ORAL at 08:23

## 2020-10-12 RX ADMIN — METHYLPREDNISOLONE SODIUM SUCCINATE SCH MG: 40 INJECTION, POWDER, FOR SOLUTION INTRAMUSCULAR; INTRAVENOUS at 06:08

## 2020-10-12 RX ADMIN — INSULIN GLARGINE SCH UNIT: 100 INJECTION, SOLUTION SUBCUTANEOUS at 20:50

## 2020-10-12 RX ADMIN — VERAPAMIL HYDROCHLORIDE SCH MG: 120 TABLET, FILM COATED, EXTENDED RELEASE ORAL at 20:49

## 2020-10-12 RX ADMIN — VERAPAMIL HYDROCHLORIDE SCH MG: 120 TABLET, FILM COATED, EXTENDED RELEASE ORAL at 08:23

## 2020-10-12 RX ADMIN — INSULIN LISPRO SCH UNITS: 100 INJECTION, SOLUTION INTRAVENOUS; SUBCUTANEOUS at 18:02

## 2020-10-12 RX ADMIN — INSULIN LISPRO SCH UNITS: 100 INJECTION, SOLUTION INTRAVENOUS; SUBCUTANEOUS at 11:47

## 2020-10-12 RX ADMIN — HYDROCODONE BITARTRATE AND ACETAMINOPHEN PRN TAB: 5; 325 TABLET ORAL at 13:54

## 2020-10-12 RX ADMIN — LINAGLIPTIN SCH MG: 5 TABLET, FILM COATED ORAL at 08:19

## 2020-10-12 RX ADMIN — CEFTRIAXONE SCH GM: 1 INJECTION, POWDER, FOR SOLUTION INTRAMUSCULAR; INTRAVENOUS at 10:22

## 2020-10-12 RX ADMIN — MIDAZOLAM PRN MLS/HR: 5 INJECTION, SOLUTION INTRAMUSCULAR; INTRAVENOUS at 15:36

## 2020-10-12 RX ADMIN — HYDROCODONE BITARTRATE AND ACETAMINOPHEN PRN TAB: 5; 325 TABLET ORAL at 03:15

## 2020-10-12 RX ADMIN — MIDAZOLAM PRN MLS/HR: 5 INJECTION, SOLUTION INTRAMUSCULAR; INTRAVENOUS at 21:20

## 2020-10-12 RX ADMIN — Medication SCH MLS/HR: at 11:46

## 2020-10-12 RX ADMIN — METHYLPREDNISOLONE SODIUM SUCCINATE SCH MG: 40 INJECTION, POWDER, FOR SOLUTION INTRAMUSCULAR; INTRAVENOUS at 20:48

## 2020-10-12 RX ADMIN — INSULIN LISPRO SCH UNITS: 100 INJECTION, SOLUTION INTRAVENOUS; SUBCUTANEOUS at 08:21

## 2020-10-12 RX ADMIN — METHYLPREDNISOLONE SODIUM SUCCINATE SCH MG: 40 INJECTION, POWDER, FOR SOLUTION INTRAMUSCULAR; INTRAVENOUS at 13:54

## 2020-10-12 RX ADMIN — ENOXAPARIN SODIUM SCH MG: 40 INJECTION SUBCUTANEOUS at 08:20

## 2020-10-12 RX ADMIN — INSULIN LISPRO SCH UNITS: 100 INJECTION, SOLUTION INTRAVENOUS; SUBCUTANEOUS at 08:22

## 2020-10-12 NOTE — NUR
The patient was transferred to the ICU at 1335 with belongings, report called to Jose Alejandro HOWARD.

## 2020-10-12 NOTE — RAD
Examination: PORTABLE CHEST 1V, KUB

 

History: Reason: ETT placement and central line placement / Spl. 

Instructions:  / History: 

 

Comparison/Correlation: None

 

Findings: Frontal view chest was obtained by portable technique. Limited 

frontal view of the upper abdomen was also obtained.

 

Right central venous subclavian catheter tip terminates overlying the left

brachiocephalic vein region.

 

Tracheal tube is 1.9 cm from the hiren. Enteric tube terminates at the 

lower mid abdominal level. Right upper quadrant surgical clips noted.

 

Limited pulmonary inflation noted. Mediastinal silhouette is unremarkable.

Minimal discoid atelectasis noted involving lung fields. Consolidation 

involving the left base and retrocardiac region in particular is seen.

 

Mild gaseous distention of stomach is noted. Gaseous distention of a left 

upper quadrant small bowel loop is noted.

 

 

Impression:

Right subclavian venous catheter joints align slightly left of midline at 

the expected brachiocephalic vein region.

 

Tracheal tube is in place. Enteric tube in place.

 

Retrocardiac left basilar consolidation.

 

No pneumothorax.

 

Electronically signed by: Emir Linares MD (10/12/2020 5:07 PM) [facility] Center[facility]

## 2020-10-12 NOTE — NUR
The patient's oxygen saturation noted to be dropping in the 80's highest 88% on 15 liters 
non rebreather mask. Paged Dr. Melgar, received call back from Meghann MATHEW at 1127, updated 
of the patient's status. This nurse was told that the patient will be moved to the ICU and 
will be placed on vapotherm. We will continue to monitor.

## 2020-10-12 NOTE — PDOC
PULMONARY PROGRESS NOTES


DATE: 10/12/20 


TIME: 15:12


Subjective


Patient more anxious than anything else she is not severely short of breath O2 

saturation remains below 90% despite nonrebreather she is not more short of 

breath no increasing cough


Vitals





Vital Signs








  Date Time  Temp Pulse Resp B/P (MAP) Pulse Ox O2 Delivery O2 Flow Rate FiO2


 


10/12/20 13:54   20  88 NonRebreather Mask  


 


10/12/20 11:02 97.9 86  106/53 (70)   15.0 





 97.9       








ROS:  No Nausea, No Chest Pain, No Abdominal Pain, No Increase Cough


General:  Alert


Lungs:  Clear


Cardiovascular:  S1, S2


Abdomen:  Soft


Neuro Exam:  Alert


Extremities:  No Edema


Skin:  Warm


Labs





Laboratory Tests








Test


 10/11/20


03:00 10/11/20


03:30 10/11/20


11:01 10/11/20


16:15


 


Sodium Level


 134 mmol/L


(136-145) 


 


 





 


Potassium Level


 5.0 mmol/L


(3.5-5.1) 


 


 





 


Chloride Level


 99 mmol/L


() 


 


 





 


Carbon Dioxide Level


 29 mmol/L


(21-32) 


 


 





 


Anion Gap 6 (6-14)    


 


Blood Urea Nitrogen


 20 mg/dL


(7-20) 


 


 





 


Creatinine


 1.2 mg/dL


(0.6-1.0) 


 


 





 


Estimated GFR


(Cockcroft-Gault) 48.8 


 


 


 





 


BUN/Creatinine Ratio 17 (6-20)    


 


Glucose Level


 351 mg/dL


(70-99) 


 


 





 


Calcium Level


 8.2 mg/dL


(8.5-10.1) 


 


 





 


Total Bilirubin


 0.3 mg/dL


(0.2-1.0) 


 


 





 


Aspartate Amino Transf


(AST/SGOT) 285 U/L


(15-37) 


 


 





 


Alanine Aminotransferase


(ALT/SGPT) 210 U/L


(14-59) 


 


 





 


Alkaline Phosphatase


 164 U/L


() 


 


 





 


C-Reactive Protein,


Quantitative 161.6 mg/L


(0-3.3) 


 


 





 


Total Protein


 6.7 g/dL


(6.4-8.2) 


 


 





 


Albumin


 2.6 g/dL


(3.4-5.0) 


 


 





 


Albumin/Globulin Ratio 0.6 (1.0-1.7)    


 


White Blood Count


 


 4.3 x10^3/uL


(4.0-11.0) 


 





 


Red Blood Count


 


 4.27 x10^6/uL


(3.50-5.40) 


 





 


Hemoglobin


 


 12.7 g/dL


(12.0-15.5) 


 





 


Hematocrit


 


 39.0 %


(36.0-47.0) 


 





 


Mean Corpuscular Volume  91 fL ()   


 


Mean Corpuscular Hemoglobin  30 pg (25-35)   


 


Mean Corpuscular Hemoglobin


Concent 


 33 g/dL


(31-37) 


 





 


Red Cell Distribution Width


 


 14.3 %


(11.5-14.5) 


 





 


Platelet Count


 


 183 x10^3/uL


(140-400) 


 





 


Neutrophils (%) (Auto)  86 % (31-73)   


 


Lymphocytes (%) (Auto)  12 % (24-48)   


 


Monocytes (%) (Auto)  3 % (0-9)   


 


Eosinophils (%) (Auto)  0 % (0-3)   


 


Basophils (%) (Auto)  0 % (0-3)   


 


Neutrophils # (Auto)


 


 3.7 x10^3/uL


(1.8-7.7) 


 





 


Lymphocytes # (Auto)


 


 0.5 x10^3/uL


(1.0-4.8) 


 





 


Monocytes # (Auto)


 


 0.1 x10^3/uL


(0.0-1.1) 


 





 


Eosinophils # (Auto)


 


 0.0 x10^3/uL


(0.0-0.7) 


 





 


Basophils # (Auto)


 


 0.0 x10^3/uL


(0.0-0.2) 


 





 


Segmented Neutrophils %  83 % (35-66)   


 


Band Neutrophils %  4 % (0-9)   


 


Lymphocytes %  11 % (24-48)   


 


Monocytes %  2 % (0-10)   


 


Platelet Estimate


 


 Adequate


(ADEQUATE) 


 





 


D-Dimer (Jada)


 


 1.96 ug/mlFEU


(0.00-0.50) 


 





 


Glucose (Fingerstick)


 


 


 430 mg/dL


(70-99) 302 mg/dL


(70-99)


 


Test


 10/11/20


19:42 10/12/20


04:48 10/12/20


07:48 10/12/20


10:30


 


Glucose (Fingerstick)


 272 mg/dL


(70-99) 


 227 mg/dL


(70-99) 236 mg/dL


(70-99)


 


White Blood Count


 


 8.2 x10^3/uL


(4.0-11.0) 


 





 


Red Blood Count


 


 4.21 x10^6/uL


(3.50-5.40) 


 





 


Hemoglobin


 


 12.5 g/dL


(12.0-15.5) 


 





 


Hematocrit


 


 37.7 %


(36.0-47.0) 


 





 


Mean Corpuscular Volume  90 fL ()   


 


Mean Corpuscular Hemoglobin  30 pg (25-35)   


 


Mean Corpuscular Hemoglobin


Concent 


 33 g/dL


(31-37) 


 





 


Red Cell Distribution Width


 


 14.0 %


(11.5-14.5) 


 





 


Platelet Count


 


 193 x10^3/uL


(140-400) 


 





 


Sodium Level


 


 136 mmol/L


(136-145) 


 





 


Potassium Level


 


 4.4 mmol/L


(3.5-5.1) 


 





 


Chloride Level


 


 101 mmol/L


() 


 





 


Carbon Dioxide Level


 


 29 mmol/L


(21-32) 


 





 


Anion Gap  6 (6-14)   


 


Blood Urea Nitrogen


 


 17 mg/dL


(7-20) 


 





 


Creatinine


 


 0.9 mg/dL


(0.6-1.0) 


 





 


Estimated GFR


(Cockcroft-Gault) 


 68.0 


 


 





 


BUN/Creatinine Ratio  19 (6-20)   


 


Glucose Level


 


 271 mg/dL


(70-99) 


 





 


Calcium Level


 


 8.0 mg/dL


(8.5-10.1) 


 





 


Total Bilirubin


 


 0.2 mg/dL


(0.2-1.0) 


 





 


Aspartate Amino Transf


(AST/SGOT) 


 89 U/L (15-37) 


 


 





 


Alanine Aminotransferase


(ALT/SGPT) 


 129 U/L


(14-59) 


 





 


Alkaline Phosphatase


 


 138 U/L


() 


 





 


Total Protein


 


 6.5 g/dL


(6.4-8.2) 


 





 


Albumin


 


 2.5 g/dL


(3.4-5.0) 


 





 


Albumin/Globulin Ratio  0.6 (1.0-1.7)   


 


Test


 10/12/20


14:35 


 


 





 


O2 Saturation 80 % (92-99)    


 


Arterial Blood pH


 7.41


(7.35-7.45) 


 


 





 


Arterial Blood pCO2 at


Patient Temp 47 mmHg


(35-46) 


 


 





 


Arterial Blood pO2 at Patient


Temp 43 mmHg


() 


 


 





 


Arterial Blood HCO3


 29 mmol/L


(21-28) 


 


 





 


Arterial Blood Base Excess


 4 mmol/L


(-3-3) 


 


 





 


Oxyhemoglobin 79.4 %    


 


Methemoglobin


 0.3 %


(0.0-1.9) 


 


 





 


Carbon Monoxide, Quantitative


 0.5 %


(0.0-1.9) 


 


 





 


FiO2 100% vapotherm    








Laboratory Tests








Test


 10/11/20


16:15 10/11/20


19:42 10/12/20


04:48 10/12/20


07:48


 


Glucose (Fingerstick)


 302 mg/dL


(70-99) 272 mg/dL


(70-99) 


 227 mg/dL


(70-99)


 


White Blood Count


 


 


 8.2 x10^3/uL


(4.0-11.0) 





 


Red Blood Count


 


 


 4.21 x10^6/uL


(3.50-5.40) 





 


Hemoglobin


 


 


 12.5 g/dL


(12.0-15.5) 





 


Hematocrit


 


 


 37.7 %


(36.0-47.0) 





 


Mean Corpuscular Volume   90 fL ()  


 


Mean Corpuscular Hemoglobin   30 pg (25-35)  


 


Mean Corpuscular Hemoglobin


Concent 


 


 33 g/dL


(31-37) 





 


Red Cell Distribution Width


 


 


 14.0 %


(11.5-14.5) 





 


Platelet Count


 


 


 193 x10^3/uL


(140-400) 





 


Sodium Level


 


 


 136 mmol/L


(136-145) 





 


Potassium Level


 


 


 4.4 mmol/L


(3.5-5.1) 





 


Chloride Level


 


 


 101 mmol/L


() 





 


Carbon Dioxide Level


 


 


 29 mmol/L


(21-32) 





 


Anion Gap   6 (6-14)  


 


Blood Urea Nitrogen


 


 


 17 mg/dL


(7-20) 





 


Creatinine


 


 


 0.9 mg/dL


(0.6-1.0) 





 


Estimated GFR


(Cockcroft-Gault) 


 


 68.0 


 





 


BUN/Creatinine Ratio   19 (6-20)  


 


Glucose Level


 


 


 271 mg/dL


(70-99) 





 


Calcium Level


 


 


 8.0 mg/dL


(8.5-10.1) 





 


Total Bilirubin


 


 


 0.2 mg/dL


(0.2-1.0) 





 


Aspartate Amino Transf


(AST/SGOT) 


 


 89 U/L (15-37) 


 





 


Alanine Aminotransferase


(ALT/SGPT) 


 


 129 U/L


(14-59) 





 


Alkaline Phosphatase


 


 


 138 U/L


() 





 


Total Protein


 


 


 6.5 g/dL


(6.4-8.2) 





 


Albumin


 


 


 2.5 g/dL


(3.4-5.0) 





 


Albumin/Globulin Ratio   0.6 (1.0-1.7)  


 


Test


 10/12/20


10:30 10/12/20


14:35 


 





 


Glucose (Fingerstick)


 236 mg/dL


(70-99) 


 


 





 


O2 Saturation  80 % (92-99)   


 


Arterial Blood pH


 


 7.41


(7.35-7.45) 


 





 


Arterial Blood pCO2 at


Patient Temp 


 47 mmHg


(35-46) 


 





 


Arterial Blood pO2 at Patient


Temp 


 43 mmHg


() 


 





 


Arterial Blood HCO3


 


 29 mmol/L


(21-28) 


 





 


Arterial Blood Base Excess


 


 4 mmol/L


(-3-3) 


 





 


Oxyhemoglobin  79.4 %   


 


Methemoglobin


 


 0.3 %


(0.0-1.9) 


 





 


Carbon Monoxide, Quantitative


 


 0.5 %


(0.0-1.9) 


 





 


FiO2  100% vapotherm   








Medications





Active Scripts








 Medications  Dose


 Route/Sig


 Max Daily Dose Days Date Category


 


 Cymbalta


  (Duloxetine Hcl)


 20 Mg Capsule.dr  20 Mg


 PO DAILY


   9/22/20 Reported


 


 Lisinopril 5 Mg


 Tablet  5 Mg


 PO DAILY


   9/22/20 Reported


 


 Amitriptyline Hcl


 10 Mg Tablet  10 Mg


 PO DAILY


   9/22/20 Reported


 


 Trulicity


  (Dulaglutide) 1.5


 Mg/0.5 Ml


 Pen.injctr  1.5 Mg


 SQ


   9/22/20 Reported


 


 Nesina


  (Alogliptin


 Benzoate) 12.5 Mg


 Tablet  12.5 Mg


 PO


   9/22/20 Reported


 


 Propranolol Hcl


 80 Mg Tablet  80 Mg


 PO DAILY


   5/14/14 Reported


 


 Cymbalta


  (Duloxetine Hcl)


 60 Mg Capsule.dr  60 Mg


 PO DAILY


   5/14/14 Reported











Impression


.





IMPRESSION:


1.  Acute hypoxemic respiratory failure.


2.  COVID-19 viral pneumonia.


3.  Abnormal chest x-ray, possible gram-positive, gram-negative pneumonia.


4.  Headaches.


5.  Type 2 diabetes.


6.  Fibromyalgia.


7.  Morbid obesity.


8.  Nonalcoholic steatohepatitis.


9.  Obstructive sleep apnea.





Plan


.


Oxygenation has been poor, unable to maintain sats with 100% nonrebreather





Patient will transfer to the intensive care unit for Vapotherm, repeat arterial 

blood gas





Continue support with steroids





Status post convalescent serum





Started on Remdesivir





Lovenox twice daily











LEWIS DICK MD              Oct 12, 2020 15:14

## 2020-10-12 NOTE — PDOC
Infectious Disease Note


Subjective


Subjective


pt is feeling ok, on 15 lit o2 by VM





ROS


ROS


no n/v/d/fever





Vital Sign


Vital Signs





Vital Signs








  Date Time  Temp Pulse Resp B/P (MAP) Pulse Ox O2 Delivery O2 Flow Rate FiO2


 


10/12/20 08:23  83  114/48    


 


10/12/20 08:00      Non-Rebreather 15.0 


 


10/12/20 07:15 97.4  24  90   





 97.4       











Physical Exam


PHYSICAL EXAM


GENERAL:  Alert, oriented x 3, slightly anxious female in no acute distress, on


nonrebreather.


HEENT:  Normocephalic, atraumatic.  Anicteric.


NECK:  Supple.  No thyromegaly.


LUNGS:  Coarse breath sounds.  No wheezing.


HEART:  S1, S2, no murmurs.


ABDOMEN:  Obese, soft, nontender, bowel sounds present.


EXTREMITIES:  No edema, no cyanosis.


DERMATOLOGIC:  Warm, dry.  No generalized rash.  Multiple tattoos.


CENTRAL NERVOUS SYSTEM:  Alert and oriented x 3, grossly nonfocal.


PSYCHIATRIC:  Slightly anxious, but calm and cooperative.





Labs


Lab





Laboratory Tests








Test


 10/11/20


11:01 10/11/20


16:15 10/11/20


19:42 10/12/20


04:48


 


Glucose (Fingerstick)


 430 mg/dL


(70-99) 302 mg/dL


(70-99) 272 mg/dL


(70-99) 





 


White Blood Count


 


 


 


 8.2 x10^3/uL


(4.0-11.0)


 


Red Blood Count


 


 


 


 4.21 x10^6/uL


(3.50-5.40)


 


Hemoglobin


 


 


 


 12.5 g/dL


(12.0-15.5)


 


Hematocrit


 


 


 


 37.7 %


(36.0-47.0)


 


Mean Corpuscular Volume    90 fL () 


 


Mean Corpuscular Hemoglobin    30 pg (25-35) 


 


Mean Corpuscular Hemoglobin


Concent 


 


 


 33 g/dL


(31-37)


 


Red Cell Distribution Width


 


 


 


 14.0 %


(11.5-14.5)


 


Platelet Count


 


 


 


 193 x10^3/uL


(140-400)


 


Sodium Level


 


 


 


 136 mmol/L


(136-145)


 


Potassium Level


 


 


 


 4.4 mmol/L


(3.5-5.1)


 


Chloride Level


 


 


 


 101 mmol/L


()


 


Carbon Dioxide Level


 


 


 


 29 mmol/L


(21-32)


 


Anion Gap    6 (6-14) 


 


Blood Urea Nitrogen


 


 


 


 17 mg/dL


(7-20)


 


Creatinine


 


 


 


 0.9 mg/dL


(0.6-1.0)


 


Estimated GFR


(Cockcroft-Gault) 


 


 


 68.0 





 


BUN/Creatinine Ratio    19 (6-20) 


 


Glucose Level


 


 


 


 271 mg/dL


(70-99)


 


Calcium Level


 


 


 


 8.0 mg/dL


(8.5-10.1)


 


Total Bilirubin


 


 


 


 0.2 mg/dL


(0.2-1.0)


 


Aspartate Amino Transf


(AST/SGOT) 


 


 


 89 U/L (15-37) 





 


Alanine Aminotransferase


(ALT/SGPT) 


 


 


 129 U/L


(14-59)


 


Alkaline Phosphatase


 


 


 


 138 U/L


()


 


Total Protein


 


 


 


 6.5 g/dL


(6.4-8.2)


 


Albumin


 


 


 


 2.5 g/dL


(3.4-5.0)


 


Albumin/Globulin Ratio    0.6 (1.0-1.7) 


 


Test


 10/12/20


07:48 


 


 





 


Glucose (Fingerstick)


 227 mg/dL


(70-99) 


 


 














Objective


Assessment


1.  Acute hypoxic respiratory failure.


2.  COVID-19 Pneumonia


3.  Migraine headaches.


4.  Diabetes.


5.  Obesity.


6.  Fibromyalgia.


7.  Abnormal LFTs.





Plan


Plan of Care


cont steroids


cont Remdesivir


supportive care











HEDY BRAND MD               Oct 12, 2020 10:42

## 2020-10-12 NOTE — PN
DATE:  10/12/2020



SUBJECTIVE:  The patient is resting, slightly propped up in bed, eating her

breakfast comfortably.  She was on nasal cannula at 10 L, maintaining her oxygen

saturating only at 72%.  Immediately after she finished her breakfast, I

switched her to the facemask up to 15 L and her oxygen saturation went up to

90%.  On questioning her, she stated that she did have shortness of breath,

cough and headache last night, but she is feeling much better this morning.



PHYSICAL EXAMINATION:

GENERAL:  When I examined her, she was pale.  No jaundice, cyanosis or

thyromegaly.  No jugular venous distention or limb edema.

VITAL SIGNS:  Her heart rate was 83, blood pressure was 114/48, temperature was

97.4, respiratory rate was 24, and oxygen saturation was 90% on 15 L of oxygen

by nasal cannula.

HEENT:  Showed normocephalic, atraumatic.

NECK:  Supple.

HEART:  Showed normal first and second sounds.  No gallop, rub or murmur.

CHEST:  Clear to auscultation.  No crepitation or rhonchi.

ABDOMEN:  Distended, soft, nontender.  No guarding or rigidity.  No

organomegaly.  All hernial orifices intact.  Bowel sounds normal.

NEUROLOGIC:  She is awake, alert, responding appropriately.  All cranial nerves

intact.  She moves extremities without difficulty.  She ambulates without

assistance or assistive devices, although she is obviously mostly bedbound.



Her intake was 600, output was 300.



LABORATORY DATA:  As of this morning, her white cell count was 8200, hemoglobin

12.5, hematocrit 38, MCV 90 and platelet count of 193,000.  Her D-dimer is 1.96

mg/dL.  Her chemistry showed a serum sodium of 136, potassium 4.4, chloride 101,

bicarbonate 29, anion gap of 6, BUN 17, creatinine 0.9, and estimated GFR was 68

mL per minute.  Her glucose was 171 and calcium was 8.  Total bilirubin was

normal.  AST, ALT, and alkaline phosphatase were elevated, but trending down. 

Her total protein was 6.5 and albumin was 2.5.



ASSESSMENT:

1.  COVID-19 pneumonia.

2.  Acute hypoxic respiratory failure with a high oxygen requirement up to 15 L,

maintaining her oxygen saturation barely at 90%.

3.  Other medical problems include:

A.  Migraine headache.

B.  Type 2 diabetes mellitus.

C.  Fibromyalgia.

D.  Hypertension.

E.  Nephrolithiasis.

F.  Nonalcoholic steatohepatitis.

G.  Obstructive sleep apnea.



PLAN:  To continue with IV antibiotic.  Continue with IV Solu-Medrol.  The

patient was treated with remdesivir as well as plasma and will continue with

steroids and DVT prophylaxis.

 



______________________________

JULIO CISSE MD



DR:  GOKUL/[id number]  JOB#:  [id number] / [id number]

DD:  10/12/2020 08:39  DT:  10/12/2020 08:52

## 2020-10-12 NOTE — RAD
Examination: PORTABLE CHEST 1V, KUB

 

History: Reason: ETT placement and central line placement / Spl. 

Instructions:  / History: 

 

Comparison/Correlation: None

 

Findings: Frontal view chest was obtained by portable technique. Limited 

frontal view of the upper abdomen was also obtained.

 

Right central venous subclavian catheter tip terminates overlying the left

brachiocephalic vein region.

 

Tracheal tube is 1.9 cm from the hiren. Enteric tube terminates at the 

lower mid abdominal level. Right upper quadrant surgical clips noted.

 

Limited pulmonary inflation noted. Mediastinal silhouette is unremarkable.

Minimal discoid atelectasis noted involving lung fields. Consolidation 

involving the left base and retrocardiac region in particular is seen.

 

Mild gaseous distention of stomach is noted. Gaseous distention of a left 

upper quadrant small bowel loop is noted.

 

 

Impression:

Right subclavian venous catheter joints align slightly left of midline at 

the expected brachiocephalic vein region.

 

Tracheal tube is in place. Enteric tube in place.

 

Retrocardiac left basilar consolidation.

 

No pneumothorax.

 

Electronically signed by: Emir Linarse MD (10/12/2020 5:07 PM) [facility]

## 2020-10-12 NOTE — NUR
SW following. Spoke with RN and reviewed chart. Pt form home. Pt currently on a 
nonrebreather and IV Rocephin. Pt COVID positive. Pt to transfer to ICU and YUNI Salazar to 
follow.

## 2020-10-12 NOTE — PDOC
Date and Time


Called to ICU for urgent intubation. Covid Positive with SaO2 upper 70s on 40L 

nasal O2





Given 100mg Propofol, 100mg Succinylcholine. Glidescope 7.5 OET DVC cords. Tube 

positioned 1cm below cords, 22cm at lip


R subclavian 3 lumen. Sterile technique. Sutured at 18cm. Tip L brachiocephalic.

Free flow all ports


R brachial a line. Prior sticks both radials. faint pulses, unable to viz either

with Ultrasound.


CXR ETT above hiren, no pneumothorax


Current Medications





Current Medications


Amitriptyline HCl (Elavil) 75 mg PRN QHS  PRN PO INSOMNIA;  Start 10/10/20 at 

16:15


Acetaminophen/ Aspirin/Caffeine (Excedrin Migraine) 2 tab PRN Q6HRS  PRN PO 

MIGRAINE HEADACHE;  Start 10/10/20 at 16:15


Dextrose (Dextrose 50%-Water Syringe) 12.5 gm PRN Q15MIN  ONCE IV ;  Start 

10/10/20 at 16:15;  Stop 10/10/20 at 16:22;  Status DC


Duloxetine HCl (Cymbalta) 60 mg BID PO  Last administered on 10/12/20at 08:19;  

Start 10/10/20 at 21:00


Acetaminophen/ Hydrocodone Bitart (Lortab 5/325) 1 tab PRN Q6HRS  PRN PO PAIN 

Last administered on 10/12/20at 13:54;  Start 10/10/20 at 16:15


Insulin Glargine (Lantus Syringe) 22 unit QHS SQ  Last administered on 

10/11/20at 21:12;  Start 10/10/20 at 21:00


Ketorolac Tromethamine (Toradol 30mg Vial) 30 mg PRN Q6HRS  PRN IVP 

INFLAMMATION;  Start 10/10/20 at 16:15;  Stop 10/15/20 at 16:14


Linagliptin (Tradjenta) 5 mg DAILY PO  Last administered on 10/12/20at 08:19;  

Start 10/10/20 at 17:00


Lisinopril (Prinivil) 5 mg DAILY PO  Last administered on 10/12/20at 08:23;  

Start 10/10/20 at 17:00


Ondansetron HCl (Zofran) 4 mg PRN Q4HRS  PRN IVP NAUSEA/VOMITING Last 

administered on 10/11/20at 11:23;  Start 10/10/20 at 16:15


Verapamil HCl (Calan Sr) 120 mg BID PO  Last administered on 10/12/20at 08:23;  

Start 10/10/20 at 21:00


Ceftriaxone Sodium (Rocephin) 1 gm Q24H IVP  Last administered on 10/12/20at 

10:22;  Start 10/11/20 at 10:00


Methylprednisolone Sodium Succinate (SOLU-Medrol 40MG VIAL) 40 mg Q8HRS IV  Last

administered on 10/12/20at 13:54;  Start 10/10/20 at 22:00


Dextrose (Dextrose 50%-Water Syringe) 12.5 gm PRN Q15MIN  PRN IV HYPOGLYCEMIA;  

Start 10/10/20 at 16:30


Non-Formulary Medication 1 ea/ Sodium Chloride 210 ml @  210 mls/hr 1X  ONCE IV 

Last administered on 10/11/20at 11:14;  Start 10/11/20 at 12:00;  Stop 10/11/20 

at 12:59;  Status DC


Non-Formulary Medication 1 ea/ Sodium Chloride 230 ml @  460 mls/hr Q24H IV  

Last administered on 10/12/20at 11:46;  Start 10/12/20 at 12:00;  Stop 10/15/20 

at 12:29


Insulin Human Lispro (HumaLOG) 0-9 UNITS TIDWMEALS SQ  Last administered on 

10/12/20at 11:48;  Start 10/11/20 at 12:00


Dextrose (Dextrose 50%-Water Syringe) 12.5 gm PRN Q15MIN  PRN IV SEE COMMENTS;  

Start 10/11/20 at 10:15;  Status UNV


Enoxaparin Sodium (Lovenox 100mg Syringe) 100 mg Q12HR SQ  Last administered on 

10/11/20at 10:57;  Start 10/11/20 at 11:00;  Stop 10/11/20 at 12:53;  Status DC


Enoxaparin Sodium (Lovenox 40mg Syringe) 40 mg Q12HR SQ  Last administered on 

10/12/20at 08:20;  Start 10/11/20 at 21:00


Insulin Human Lispro (HumaLOG) 10 units TIDAC SQ  Last administered on 

10/12/20at 11:47;  Start 10/11/20 at 16:30


Insulin Human Lispro (HumaLOG) 10 units 1X  ONCE SQ  Last administered on 

10/11/20at 14:07;  Start 10/11/20 at 13:00;  Stop 10/11/20 at 13:39;  Status DC


Calcium Carbonate/ Glycine (Tums) 1,000 mg PRN Q4HRS  PRN PO INDIGESTION Last 

administered on 10/12/20at 10:46;  Start 10/12/20 at 10:45


Fentanyl Citrate 30 ml @  2.5 mls/hr CONT  PRN IV SEE PROTOCOL Last administered

on 10/12/20at 15:36;  Start 10/12/20 at 15:00


Propofol 100 ml @ 3 mls/hr CONT  PRN IV SEE PROTOCOL;  Start 10/12/20 at 15:00


Midazolam HCl 100 ml @ 1 mls/hr CONT  PRN IV SEE PROTOCOL Last administered on 

10/12/20at 15:36;  Start 10/12/20 at 15:00


Succinylcholine Chloride (Anectine) 200 mg 1X  ONCE IV  Last administered on 

10/12/20at 15:34;  Start 10/12/20 at 15:00;  Stop 10/12/20 at 15:12;  Status DC


Vecuronium Bromide (Norcuron Bolus) 6 mg 1X  ONCE IV  Last administered on 

10/12/20at 15:51;  Start 10/12/20 at 15:45;  Stop 10/12/20 at 15:46;  Status DC





Active Scripts


Active


Reported


Cymbalta (Duloxetine Hcl) 20 Mg Capsule. 20 Mg PO DAILY


Lisinopril 5 Mg Tablet 5 Mg PO DAILY


Amitriptyline Hcl 10 Mg Tablet 10 Mg PO DAILY


Trulicity (Dulaglutide) 1.5 Mg/0.5 Ml Pen.injctr 1.5 Mg SQ 


Nesina (Alogliptin Benzoate) 12.5 Mg Tablet 12.5 Mg PO 


Propranolol Hcl 80 Mg Tablet 80 Mg PO DAILY


Cymbalta (Duloxetine Hcl) 60 Mg Capsule.dr 60 Mg PO DAILY


Pertinent Labs/Test





Laboratory Tests








Test


 10/11/20


03:00 10/11/20


03:30 10/11/20


11:01 10/11/20


16:15


 


Sodium Level


 134 mmol/L


(136-145) 


 


 





 


Potassium Level


 5.0 mmol/L


(3.5-5.1) 


 


 





 


Chloride Level


 99 mmol/L


() 


 


 





 


Carbon Dioxide Level


 29 mmol/L


(21-32) 


 


 





 


Anion Gap 6 (6-14)    


 


Blood Urea Nitrogen


 20 mg/dL


(7-20) 


 


 





 


Creatinine


 1.2 mg/dL


(0.6-1.0) 


 


 





 


Estimated GFR


(Cockcroft-Gault) 48.8 


 


 


 





 


BUN/Creatinine Ratio 17 (6-20)    


 


Glucose Level


 351 mg/dL


(70-99) 


 


 





 


Calcium Level


 8.2 mg/dL


(8.5-10.1) 


 


 





 


Total Bilirubin


 0.3 mg/dL


(0.2-1.0) 


 


 





 


Aspartate Amino Transf


(AST/SGOT) 285 U/L


(15-37) 


 


 





 


Alanine Aminotransferase


(ALT/SGPT) 210 U/L


(14-59) 


 


 





 


Alkaline Phosphatase


 164 U/L


() 


 


 





 


C-Reactive Protein,


Quantitative 161.6 mg/L


(0-3.3) 


 


 





 


Total Protein


 6.7 g/dL


(6.4-8.2) 


 


 





 


Albumin


 2.6 g/dL


(3.4-5.0) 


 


 





 


Albumin/Globulin Ratio 0.6 (1.0-1.7)    


 


White Blood Count


 


 4.3 x10^3/uL


(4.0-11.0) 


 





 


Red Blood Count


 


 4.27 x10^6/uL


(3.50-5.40) 


 





 


Hemoglobin


 


 12.7 g/dL


(12.0-15.5) 


 





 


Hematocrit


 


 39.0 %


(36.0-47.0) 


 





 


Mean Corpuscular Volume  91 fL ()   


 


Mean Corpuscular Hemoglobin  30 pg (25-35)   


 


Mean Corpuscular Hemoglobin


Concent 


 33 g/dL


(31-37) 


 





 


Red Cell Distribution Width


 


 14.3 %


(11.5-14.5) 


 





 


Platelet Count


 


 183 x10^3/uL


(140-400) 


 





 


Neutrophils (%) (Auto)  86 % (31-73)   


 


Lymphocytes (%) (Auto)  12 % (24-48)   


 


Monocytes (%) (Auto)  3 % (0-9)   


 


Eosinophils (%) (Auto)  0 % (0-3)   


 


Basophils (%) (Auto)  0 % (0-3)   


 


Neutrophils # (Auto)


 


 3.7 x10^3/uL


(1.8-7.7) 


 





 


Lymphocytes # (Auto)


 


 0.5 x10^3/uL


(1.0-4.8) 


 





 


Monocytes # (Auto)


 


 0.1 x10^3/uL


(0.0-1.1) 


 





 


Eosinophils # (Auto)


 


 0.0 x10^3/uL


(0.0-0.7) 


 





 


Basophils # (Auto)


 


 0.0 x10^3/uL


(0.0-0.2) 


 





 


Segmented Neutrophils %  83 % (35-66)   


 


Band Neutrophils %  4 % (0-9)   


 


Lymphocytes %  11 % (24-48)   


 


Monocytes %  2 % (0-10)   


 


Platelet Estimate


 


 Adequate


(ADEQUATE) 


 





 


D-Dimer (Jada)


 


 1.96 ug/mlFEU


(0.00-0.50) 


 





 


Glucose (Fingerstick)


 


 


 430 mg/dL


(70-99) 302 mg/dL


(70-99)


 


Test


 10/11/20


19:42 10/12/20


04:48 10/12/20


07:48 10/12/20


10:30


 


Glucose (Fingerstick)


 272 mg/dL


(70-99) 


 227 mg/dL


(70-99) 236 mg/dL


(70-99)


 


White Blood Count


 


 8.2 x10^3/uL


(4.0-11.0) 


 





 


Red Blood Count


 


 4.21 x10^6/uL


(3.50-5.40) 


 





 


Hemoglobin


 


 12.5 g/dL


(12.0-15.5) 


 





 


Hematocrit


 


 37.7 %


(36.0-47.0) 


 





 


Mean Corpuscular Volume  90 fL ()   


 


Mean Corpuscular Hemoglobin  30 pg (25-35)   


 


Mean Corpuscular Hemoglobin


Concent 


 33 g/dL


(31-37) 


 





 


Red Cell Distribution Width


 


 14.0 %


(11.5-14.5) 


 





 


Platelet Count


 


 193 x10^3/uL


(140-400) 


 





 


Sodium Level


 


 136 mmol/L


(136-145) 


 





 


Potassium Level


 


 4.4 mmol/L


(3.5-5.1) 


 





 


Chloride Level


 


 101 mmol/L


() 


 





 


Carbon Dioxide Level


 


 29 mmol/L


(21-32) 


 





 


Anion Gap  6 (6-14)   


 


Blood Urea Nitrogen


 


 17 mg/dL


(7-20) 


 





 


Creatinine


 


 0.9 mg/dL


(0.6-1.0) 


 





 


Estimated GFR


(Cockcroft-Gault) 


 68.0 


 


 





 


BUN/Creatinine Ratio  19 (6-20)   


 


Glucose Level


 


 271 mg/dL


(70-99) 


 





 


Calcium Level


 


 8.0 mg/dL


(8.5-10.1) 


 





 


Total Bilirubin


 


 0.2 mg/dL


(0.2-1.0) 


 





 


Aspartate Amino Transf


(AST/SGOT) 


 89 U/L (15-37) 


 


 





 


Alanine Aminotransferase


(ALT/SGPT) 


 129 U/L


(14-59) 


 





 


Alkaline Phosphatase


 


 138 U/L


() 


 





 


Total Protein


 


 6.5 g/dL


(6.4-8.2) 


 





 


Albumin


 


 2.5 g/dL


(3.4-5.0) 


 





 


Albumin/Globulin Ratio  0.6 (1.0-1.7)   


 


Test


 10/12/20


14:35 


 


 





 


O2 Saturation 80 % (92-99)    


 


Arterial Blood pH


 7.41


(7.35-7.45) 


 


 





 


Arterial Blood pCO2 at


Patient Temp 47 mmHg


(35-46) 


 


 





 


Arterial Blood pO2 at Patient


Temp 43 mmHg


() 


 


 





 


Arterial Blood HCO3


 29 mmol/L


(21-28) 


 


 





 


Arterial Blood Base Excess


 4 mmol/L


(-3-3) 


 


 





 


Oxyhemoglobin 79.4 %    


 


Methemoglobin


 0.3 %


(0.0-1.9) 


 


 





 


Carbon Monoxide, Quantitative


 0.5 %


(0.0-1.9) 


 


 





 


FiO2 100% vapotherm    








Laboratory Tests








Test


 10/11/20


19:42 10/12/20


04:48 10/12/20


07:48 10/12/20


10:30


 


Glucose (Fingerstick)


 272 mg/dL


(70-99) 


 227 mg/dL


(70-99) 236 mg/dL


(70-99)


 


White Blood Count


 


 8.2 x10^3/uL


(4.0-11.0) 


 





 


Red Blood Count


 


 4.21 x10^6/uL


(3.50-5.40) 


 





 


Hemoglobin


 


 12.5 g/dL


(12.0-15.5) 


 





 


Hematocrit


 


 37.7 %


(36.0-47.0) 


 





 


Mean Corpuscular Volume  90 fL ()   


 


Mean Corpuscular Hemoglobin  30 pg (25-35)   


 


Mean Corpuscular Hemoglobin


Concent 


 33 g/dL


(31-37) 


 





 


Red Cell Distribution Width


 


 14.0 %


(11.5-14.5) 


 





 


Platelet Count


 


 193 x10^3/uL


(140-400) 


 





 


Sodium Level


 


 136 mmol/L


(136-145) 


 





 


Potassium Level


 


 4.4 mmol/L


(3.5-5.1) 


 





 


Chloride Level


 


 101 mmol/L


() 


 





 


Carbon Dioxide Level


 


 29 mmol/L


(21-32) 


 





 


Anion Gap  6 (6-14)   


 


Blood Urea Nitrogen


 


 17 mg/dL


(7-20) 


 





 


Creatinine


 


 0.9 mg/dL


(0.6-1.0) 


 





 


Estimated GFR


(Cockcroft-Gault) 


 68.0 


 


 





 


BUN/Creatinine Ratio  19 (6-20)   


 


Glucose Level


 


 271 mg/dL


(70-99) 


 





 


Calcium Level


 


 8.0 mg/dL


(8.5-10.1) 


 





 


Total Bilirubin


 


 0.2 mg/dL


(0.2-1.0) 


 





 


Aspartate Amino Transf


(AST/SGOT) 


 89 U/L (15-37) 


 


 





 


Alanine Aminotransferase


(ALT/SGPT) 


 129 U/L


(14-59) 


 





 


Alkaline Phosphatase


 


 138 U/L


() 


 





 


Total Protein


 


 6.5 g/dL


(6.4-8.2) 


 





 


Albumin


 


 2.5 g/dL


(3.4-5.0) 


 





 


Albumin/Globulin Ratio  0.6 (1.0-1.7)   


 


Test


 10/12/20


14:35 


 


 





 


O2 Saturation 80 % (92-99)    


 


Arterial Blood pH


 7.41


(7.35-7.45) 


 


 





 


Arterial Blood pCO2 at


Patient Temp 47 mmHg


(35-46) 


 


 





 


Arterial Blood pO2 at Patient


Temp 43 mmHg


() 


 


 





 


Arterial Blood HCO3


 29 mmol/L


(21-28) 


 


 





 


Arterial Blood Base Excess


 4 mmol/L


(-3-3) 


 


 





 


Oxyhemoglobin 79.4 %    


 


Methemoglobin


 0.3 %


(0.0-1.9) 


 


 





 


Carbon Monoxide, Quantitative


 0.5 %


(0.0-1.9) 


 


 





 


FiO2 100% vapotherm    








LAST VITALS





Vital Signs








  Date Time  Temp Pulse Resp B/P (MAP) Pulse Ox O2 Delivery O2 Flow Rate FiO2


 


10/12/20 16:00      Mechanical Ventilator  


 


10/12/20 15:46  94 24 113/64 (80) 86   


 


10/12/20 13:40       40.0 


 


10/12/20 11:02 97.9       





 97.9       

















LINDSAY TRACY MD                Oct 12, 2020 16:44

## 2020-10-12 NOTE — NUR
Patient transfer from room 645 to 110 at 1345 on 15L non rebreather sat 84%. Patient placed 
on Vapotherm 40L/100%. After 1hr on Vapotherm sats did not improve much, ABG drawn. Patients 
pao2 43, orders from Dr Melgar to intubate. Vent settings 28/450/10/100% per Dr Melgar. 
Patient repeat gasses messaged to Meghann MATHEW, orders to adjust vent settings AC 
24/450/11/100%. Patient currently in sync with vent.  Luis Alberto updated on patient status 
over the phone. Will monitor.

## 2020-10-13 VITALS — SYSTOLIC BLOOD PRESSURE: 82 MMHG | DIASTOLIC BLOOD PRESSURE: 48 MMHG

## 2020-10-13 VITALS — SYSTOLIC BLOOD PRESSURE: 109 MMHG | DIASTOLIC BLOOD PRESSURE: 70 MMHG

## 2020-10-13 VITALS — SYSTOLIC BLOOD PRESSURE: 103 MMHG | DIASTOLIC BLOOD PRESSURE: 64 MMHG

## 2020-10-13 VITALS — DIASTOLIC BLOOD PRESSURE: 66 MMHG | SYSTOLIC BLOOD PRESSURE: 111 MMHG

## 2020-10-13 VITALS — DIASTOLIC BLOOD PRESSURE: 62 MMHG | SYSTOLIC BLOOD PRESSURE: 109 MMHG

## 2020-10-13 VITALS — SYSTOLIC BLOOD PRESSURE: 121 MMHG | DIASTOLIC BLOOD PRESSURE: 68 MMHG

## 2020-10-13 VITALS — SYSTOLIC BLOOD PRESSURE: 108 MMHG | DIASTOLIC BLOOD PRESSURE: 64 MMHG

## 2020-10-13 VITALS — SYSTOLIC BLOOD PRESSURE: 114 MMHG | DIASTOLIC BLOOD PRESSURE: 71 MMHG

## 2020-10-13 VITALS — DIASTOLIC BLOOD PRESSURE: 68 MMHG | SYSTOLIC BLOOD PRESSURE: 136 MMHG

## 2020-10-13 VITALS — DIASTOLIC BLOOD PRESSURE: 56 MMHG | SYSTOLIC BLOOD PRESSURE: 96 MMHG

## 2020-10-13 VITALS — SYSTOLIC BLOOD PRESSURE: 103 MMHG | DIASTOLIC BLOOD PRESSURE: 66 MMHG

## 2020-10-13 VITALS — DIASTOLIC BLOOD PRESSURE: 66 MMHG | SYSTOLIC BLOOD PRESSURE: 105 MMHG

## 2020-10-13 VITALS — DIASTOLIC BLOOD PRESSURE: 68 MMHG | SYSTOLIC BLOOD PRESSURE: 107 MMHG

## 2020-10-13 VITALS — SYSTOLIC BLOOD PRESSURE: 136 MMHG | DIASTOLIC BLOOD PRESSURE: 82 MMHG

## 2020-10-13 VITALS — DIASTOLIC BLOOD PRESSURE: 68 MMHG | SYSTOLIC BLOOD PRESSURE: 115 MMHG

## 2020-10-13 VITALS — SYSTOLIC BLOOD PRESSURE: 114 MMHG | DIASTOLIC BLOOD PRESSURE: 66 MMHG

## 2020-10-13 VITALS — SYSTOLIC BLOOD PRESSURE: 101 MMHG | DIASTOLIC BLOOD PRESSURE: 59 MMHG

## 2020-10-13 VITALS — DIASTOLIC BLOOD PRESSURE: 64 MMHG | SYSTOLIC BLOOD PRESSURE: 110 MMHG

## 2020-10-13 VITALS — SYSTOLIC BLOOD PRESSURE: 107 MMHG | DIASTOLIC BLOOD PRESSURE: 63 MMHG

## 2020-10-13 VITALS — SYSTOLIC BLOOD PRESSURE: 100 MMHG | DIASTOLIC BLOOD PRESSURE: 57 MMHG

## 2020-10-13 VITALS — SYSTOLIC BLOOD PRESSURE: 127 MMHG | DIASTOLIC BLOOD PRESSURE: 60 MMHG

## 2020-10-13 VITALS — DIASTOLIC BLOOD PRESSURE: 50 MMHG | SYSTOLIC BLOOD PRESSURE: 90 MMHG

## 2020-10-13 VITALS — DIASTOLIC BLOOD PRESSURE: 65 MMHG | SYSTOLIC BLOOD PRESSURE: 107 MMHG

## 2020-10-13 VITALS — DIASTOLIC BLOOD PRESSURE: 68 MMHG | SYSTOLIC BLOOD PRESSURE: 138 MMHG

## 2020-10-13 LAB
ALBUMIN SERPL-MCNC: 2.1 G/DL (ref 3.4–5)
ALBUMIN/GLOB SERPL: 0.6 {RATIO} (ref 1–1.7)
ALP SERPL-CCNC: 133 U/L (ref 46–116)
ALT SERPL-CCNC: 105 U/L (ref 14–59)
ANION GAP SERPL CALC-SCNC: 8 MMOL/L (ref 6–14)
AST SERPL-CCNC: 124 U/L (ref 15–37)
BASE EXCESS ABG: 2 MMOL/L (ref -3–3)
BILIRUB SERPL-MCNC: 0.3 MG/DL (ref 0.2–1)
BUN SERPL-MCNC: 19 MG/DL (ref 7–20)
BUN/CREAT SERPL: 24 (ref 6–20)
CALCIUM SERPL-MCNC: 7.9 MG/DL (ref 8.5–10.1)
CHLORIDE SERPL-SCNC: 101 MMOL/L (ref 98–107)
CO2 SERPL-SCNC: 29 MMOL/L (ref 21–32)
CREAT SERPL-MCNC: 0.8 MG/DL (ref 0.6–1)
ERYTHROCYTE [DISTWIDTH] IN BLOOD BY AUTOMATED COUNT: 14 % (ref 11.5–14.5)
GFR SERPLBLD BASED ON 1.73 SQ M-ARVRAT: 77.9 ML/MIN
GLUCOSE SERPL-MCNC: 270 MG/DL (ref 70–99)
HCO3 BLDA-SCNC: 25 MMOL/L (ref 21–28)
HCT VFR BLD CALC: 34.8 % (ref 36–47)
HGB BLD-MCNC: 11.6 G/DL (ref 12–15.5)
INSPIRATION SETTING TIME VENT: (no result)
MCH RBC QN AUTO: 30 PG (ref 25–35)
MCHC RBC AUTO-ENTMCNC: 33 G/DL (ref 31–37)
MCV RBC AUTO: 89 FL (ref 79–100)
PCO2 BLDA: 34 MMHG (ref 35–46)
PLATELET # BLD AUTO: 203 X10^3/UL (ref 140–400)
PO2 BLDA: 107 MMHG (ref 75–108)
POTASSIUM SERPL-SCNC: 4.1 MMOL/L (ref 3.5–5.1)
PROT SERPL-MCNC: 5.8 G/DL (ref 6.4–8.2)
RBC # BLD AUTO: 3.92 X10^6/UL (ref 3.5–5.4)
SAO2 % BLDA: 98 % (ref 92–99)
SODIUM SERPL-SCNC: 138 MMOL/L (ref 136–145)
WBC # BLD AUTO: 5.7 X10^3/UL (ref 4–11)

## 2020-10-13 RX ADMIN — METHYLPREDNISOLONE SODIUM SUCCINATE SCH MG: 40 INJECTION, POWDER, FOR SOLUTION INTRAMUSCULAR; INTRAVENOUS at 14:06

## 2020-10-13 RX ADMIN — METHYLPREDNISOLONE SODIUM SUCCINATE SCH MG: 40 INJECTION, POWDER, FOR SOLUTION INTRAMUSCULAR; INTRAVENOUS at 20:52

## 2020-10-13 RX ADMIN — MIDAZOLAM PRN MLS/HR: 5 INJECTION, SOLUTION INTRAMUSCULAR; INTRAVENOUS at 13:06

## 2020-10-13 RX ADMIN — INSULIN LISPRO SCH UNITS: 100 INJECTION, SOLUTION INTRAVENOUS; SUBCUTANEOUS at 11:33

## 2020-10-13 RX ADMIN — CEFTRIAXONE SCH GM: 1 INJECTION, POWDER, FOR SOLUTION INTRAMUSCULAR; INTRAVENOUS at 10:42

## 2020-10-13 RX ADMIN — VERAPAMIL HYDROCHLORIDE SCH MG: 120 TABLET, FILM COATED, EXTENDED RELEASE ORAL at 20:51

## 2020-10-13 RX ADMIN — ENOXAPARIN SODIUM SCH MG: 40 INJECTION SUBCUTANEOUS at 08:33

## 2020-10-13 RX ADMIN — ENOXAPARIN SODIUM SCH MG: 40 INJECTION SUBCUTANEOUS at 20:52

## 2020-10-13 RX ADMIN — INSULIN LISPRO SCH UNITS: 100 INJECTION, SOLUTION INTRAVENOUS; SUBCUTANEOUS at 18:11

## 2020-10-13 RX ADMIN — INSULIN LISPRO SCH UNITS: 100 INJECTION, SOLUTION INTRAVENOUS; SUBCUTANEOUS at 08:32

## 2020-10-13 RX ADMIN — VERAPAMIL HYDROCHLORIDE SCH MG: 120 TABLET, FILM COATED, EXTENDED RELEASE ORAL at 08:34

## 2020-10-13 RX ADMIN — METHYLPREDNISOLONE SODIUM SUCCINATE SCH MG: 40 INJECTION, POWDER, FOR SOLUTION INTRAMUSCULAR; INTRAVENOUS at 05:53

## 2020-10-13 RX ADMIN — INSULIN GLARGINE SCH UNIT: 100 INJECTION, SOLUTION SUBCUTANEOUS at 20:54

## 2020-10-13 RX ADMIN — INSULIN LISPRO SCH UNITS: 100 INJECTION, SOLUTION INTRAVENOUS; SUBCUTANEOUS at 07:30

## 2020-10-13 RX ADMIN — INSULIN LISPRO SCH UNITS: 100 INJECTION, SOLUTION INTRAVENOUS; SUBCUTANEOUS at 11:32

## 2020-10-13 RX ADMIN — INSULIN LISPRO SCH UNITS: 100 INJECTION, SOLUTION INTRAVENOUS; SUBCUTANEOUS at 18:10

## 2020-10-13 RX ADMIN — MIDAZOLAM PRN MLS/HR: 5 INJECTION, SOLUTION INTRAMUSCULAR; INTRAVENOUS at 05:29

## 2020-10-13 RX ADMIN — LINAGLIPTIN SCH MG: 5 TABLET, FILM COATED ORAL at 08:33

## 2020-10-13 RX ADMIN — LISINOPRIL SCH MG: 5 TABLET ORAL at 08:33

## 2020-10-13 RX ADMIN — Medication SCH MLS/HR: at 11:33

## 2020-10-13 NOTE — PN
DATE:  10/13/2020



SUBJECTIVE:  The patient is sedated, intubated and mechanically ventilated.



PHYSICAL EXAMINATION:

GENERAL:  When I examined her, she looked pale, but not jaundiced, cyanosed or

thyromegaly.  No jugular venous distention.  No lower limb edema.

VITAL SIGNS:  Her heart rate was 73, blood pressure was 114/71, temperature was

98.8, respiratory rate was 24, and oxygen saturation was 99%.

HEAD, EYES, EARS, NOSE AND THROAT:  Showed normocephalic, atraumatic.  She has

orotracheal and orogastric tubes.

NECK:  Supple.

HEART:  Normal first and second heart sounds.  No gallop, rub or murmur.

CHEST:  Shows central trachea, equal bilateral expansion, air entry, vesicular

sounds.  No crepitation or rhonchi anteriorly.

ABDOMEN:  Distended, soft, nontender.

NEUROLOGIC:  She is heavily sedated.



Her intake was 1350, no output was recorded.



LABORATORY DATA:  Her lab work as of this morning showed a white cell count

5700, hemoglobin 11, hematocrit 34, MCV 89 and platelet count 203,000.  Serum

sodium was 138, potassium 4, chloride 101, bicarbonate 29, and anion gap of 8. 

BUN 19, creatinine 0.8, estimated GFR was 78 mL per minute.  Her glucose was

170, calcium was 7.9.  Total bilirubin is normal.  AST, ALT, alkaline

phosphatase are elevated, although they are actually trending down.  Her total

protein was 5.8, albumin 2.1.  Her D-dimer was 1.96.  Her blood gases showed a

pH of 7.51, pCO2 of 28, pO2 of 49, bicarbonate 22 and oxygen saturation was 88%

on FiO2 of 100%.  Today's labs are still pending at the time of dictation.  Her

chest x-ray showed that she has right subclavian venous catheter line, slightly

left midline at the expected brachiocephalic vein region, tracheal tube is in

place, retrocardiac left basilar consolidation, no pneumothorax.



ASSESSMENT:

1.  COVID-19 pneumonia.

2.  Acute hypoxic respiratory failure, currently intubated and mechanically

ventilated, maintaining her oxygen saturation at 100% on FiO2 of 100%.

3.  Other medical problems include:

A. Migraine headache.

B.  Type 2 diabetes mellitus.

C.  Fibromyalgia.

D.  Hypertension.

E.  Nephrolithiasis.

F.  Nonalcoholic steatohepatitis.

G.  Obstructive sleep apnea.



PLAN:  To continue with IV antibiotic.  Continue with IV Solu-Medrol.  The

patient was treated with remdesivir as well as convalescent plasma.  We will

obviously continue with DVT and GI prophylaxis.  Continue to monitor blood sugar

and adjust insulin as needed.  Continue with IV antibiotic.

 



______________________________

JULIO CISSE MD



DR:  GOKUL/glo  JOB#:  [id number] / [id number]

DD:  10/13/2020 07:53  DT:  10/13/2020 08:04

## 2020-10-13 NOTE — NUR
When this RN suctioned patient routinely at 2100, patient's HR dropped to 30s, became 
hypotensive with SBP in 80s, and SpO2 dropped as low as 78%. Patient recovered- HR back in 
70s, BP in 120s, and SpO2 stayed at 88-89%. This RN increased FiO2 to 100%, RT notified. Due 
to severe instability will minimize stressors including turning.

## 2020-10-13 NOTE — NUR
SS following for discharge planning. SS reviewed pt chart and discussed with pt RN. Pt is 
from home with spouse and is currently on the vent at 80%. Pt on IV Rocephin. Pt getting 
Plasma and Remdesivir. COVID19 positive. SS will continue to follow for discharge planning.

## 2020-10-13 NOTE — PDOC
PULMONARY PROGRESS NOTES


DATE: 10/13/20 


TIME: 10:52


Subjective


Patient was intubated on 10/12/20 for increased resp. distress 


remains intubated/ sedated 


No overnight concerns from nursing


Vitals





Vital Signs








  Date Time  Temp Pulse Resp B/P (MAP) Pulse Ox O2 Delivery O2 Flow Rate FiO2


 


10/13/20 10:00  82 24 136/68 (90) 100 Ventilator  


 


10/13/20 08:00 98.6       





 98.6       


 


10/13/20 02:21       15.0 








Comments


Pt. is seen during covid 19 pandemic, visual exam preformed 


RRR


intubated 


no edema or rash noted


Labs





Laboratory Tests








Test


 10/11/20


11:01 10/11/20


16:15 10/11/20


19:42 10/12/20


04:48


 


Glucose (Fingerstick)


 430 mg/dL


(70-99) 302 mg/dL


(70-99) 272 mg/dL


(70-99) 





 


White Blood Count


 


 


 


 8.2 x10^3/uL


(4.0-11.0)


 


Red Blood Count


 


 


 


 4.21 x10^6/uL


(3.50-5.40)


 


Hemoglobin


 


 


 


 12.5 g/dL


(12.0-15.5)


 


Hematocrit


 


 


 


 37.7 %


(36.0-47.0)


 


Mean Corpuscular Volume    90 fL () 


 


Mean Corpuscular Hemoglobin    30 pg (25-35) 


 


Mean Corpuscular Hemoglobin


Concent 


 


 


 33 g/dL


(31-37)


 


Red Cell Distribution Width


 


 


 


 14.0 %


(11.5-14.5)


 


Platelet Count


 


 


 


 193 x10^3/uL


(140-400)


 


Sodium Level


 


 


 


 136 mmol/L


(136-145)


 


Potassium Level


 


 


 


 4.4 mmol/L


(3.5-5.1)


 


Chloride Level


 


 


 


 101 mmol/L


()


 


Carbon Dioxide Level


 


 


 


 29 mmol/L


(21-32)


 


Anion Gap    6 (6-14) 


 


Blood Urea Nitrogen


 


 


 


 17 mg/dL


(7-20)


 


Creatinine


 


 


 


 0.9 mg/dL


(0.6-1.0)


 


Estimated GFR


(Cockcroft-Gault) 


 


 


 68.0 





 


BUN/Creatinine Ratio    19 (6-20) 


 


Glucose Level


 


 


 


 271 mg/dL


(70-99)


 


Calcium Level


 


 


 


 8.0 mg/dL


(8.5-10.1)


 


Total Bilirubin


 


 


 


 0.2 mg/dL


(0.2-1.0)


 


Aspartate Amino Transf


(AST/SGOT) 


 


 


 89 U/L (15-37) 





 


Alanine Aminotransferase


(ALT/SGPT) 


 


 


 129 U/L


(14-59)


 


Alkaline Phosphatase


 


 


 


 138 U/L


()


 


Total Protein


 


 


 


 6.5 g/dL


(6.4-8.2)


 


Albumin


 


 


 


 2.5 g/dL


(3.4-5.0)


 


Albumin/Globulin Ratio    0.6 (1.0-1.7) 


 


Test


 10/12/20


07:48 10/12/20


10:30 10/12/20


14:35 10/12/20


17:00


 


Glucose (Fingerstick)


 227 mg/dL


(70-99) 236 mg/dL


(70-99) 


 





 


O2 Saturation   80 % (92-99)  88 % (92-99) 


 


Arterial Blood pH


 


 


 7.41


(7.35-7.45) 7.51


(7.35-7.45)


 


Arterial Blood pCO2 at


Patient Temp 


 


 47 mmHg


(35-46) 28 mmHg


(35-46)


 


Arterial Blood pO2 at Patient


Temp 


 


 43 mmHg


() 49 mmHg


()


 


Arterial Blood HCO3


 


 


 29 mmol/L


(21-28) 22 mmol/L


(21-28)


 


Arterial Blood Base Excess


 


 


 4 mmol/L


(-3-3) 0 mmol/L


(-3-3)


 


Oxyhemoglobin   79.4 %  87.3 % 


 


Methemoglobin


 


 


 0.3 %


(0.0-1.9) 0.3 %


(0.0-1.9)


 


Carbon Monoxide, Quantitative


 


 


 0.5 %


(0.0-1.9) 0.3 %


(0.0-1.9)


 


FiO2   100% vapotherm  100% 


 


Test


 10/12/20


17:51 10/12/20


20:53 10/13/20


05:15 10/13/20


08:00


 


Glucose (Fingerstick)


 218 mg/dL


(70-99) 208 mg/dL


(70-99) 


 





 


White Blood Count


 


 


 5.7 x10^3/uL


(4.0-11.0) 





 


Red Blood Count


 


 


 3.92 x10^6/uL


(3.50-5.40) 





 


Hemoglobin


 


 


 11.6 g/dL


(12.0-15.5) 





 


Hematocrit


 


 


 34.8 %


(36.0-47.0) 





 


Mean Corpuscular Volume   89 fL ()  


 


Mean Corpuscular Hemoglobin   30 pg (25-35)  


 


Mean Corpuscular Hemoglobin


Concent 


 


 33 g/dL


(31-37) 





 


Red Cell Distribution Width


 


 


 14.0 %


(11.5-14.5) 





 


Platelet Count


 


 


 203 x10^3/uL


(140-400) 





 


Sodium Level


 


 


 138 mmol/L


(136-145) 





 


Potassium Level


 


 


 4.1 mmol/L


(3.5-5.1) 





 


Chloride Level


 


 


 101 mmol/L


() 





 


Carbon Dioxide Level


 


 


 29 mmol/L


(21-32) 





 


Anion Gap   8 (6-14)  


 


Blood Urea Nitrogen


 


 


 19 mg/dL


(7-20) 





 


Creatinine


 


 


 0.8 mg/dL


(0.6-1.0) 





 


Estimated GFR


(Cockcroft-Gault) 


 


 77.9 


 





 


BUN/Creatinine Ratio   24 (6-20)  


 


Glucose Level


 


 


 270 mg/dL


(70-99) 





 


Calcium Level


 


 


 7.9 mg/dL


(8.5-10.1) 





 


Total Bilirubin


 


 


 0.3 mg/dL


(0.2-1.0) 





 


Aspartate Amino Transf


(AST/SGOT) 


 


 124 U/L


(15-37) 





 


Alanine Aminotransferase


(ALT/SGPT) 


 


 105 U/L


(14-59) 





 


Alkaline Phosphatase


 


 


 133 U/L


() 





 


Total Protein


 


 


 5.8 g/dL


(6.4-8.2) 





 


Albumin


 


 


 2.1 g/dL


(3.4-5.0) 





 


Albumin/Globulin Ratio   0.6 (1.0-1.7)  


 


O2 Saturation    98 % (92-99) 


 


Arterial Blood pH


 


 


 


 7.48


(7.35-7.45)


 


Arterial Blood pCO2 at


Patient Temp 


 


 


 34 mmHg


(35-46)


 


Arterial Blood pO2 at Patient


Temp 


 


 


 107 mmHg


()


 


Arterial Blood HCO3


 


 


 


 25 mmol/L


(21-28)


 


Arterial Blood Base Excess


 


 


 


 2 mmol/L


(-3-3)


 


FiO2    100%+11 








Laboratory Tests








Test


 10/12/20


14:35 10/12/20


17:00 10/12/20


17:51 10/12/20


20:53


 


O2 Saturation 80 % (92-99)  88 % (92-99)   


 


Arterial Blood pH


 7.41


(7.35-7.45) 7.51


(7.35-7.45) 


 





 


Arterial Blood pCO2 at


Patient Temp 47 mmHg


(35-46) 28 mmHg


(35-46) 


 





 


Arterial Blood pO2 at Patient


Temp 43 mmHg


() 49 mmHg


() 


 





 


Arterial Blood HCO3


 29 mmol/L


(21-28) 22 mmol/L


(21-28) 


 





 


Arterial Blood Base Excess


 4 mmol/L


(-3-3) 0 mmol/L


(-3-3) 


 





 


Oxyhemoglobin 79.4 %  87.3 %   


 


Methemoglobin


 0.3 %


(0.0-1.9) 0.3 %


(0.0-1.9) 


 





 


Carbon Monoxide, Quantitative


 0.5 %


(0.0-1.9) 0.3 %


(0.0-1.9) 


 





 


FiO2 100% vapotherm  100%   


 


Glucose (Fingerstick)


 


 


 218 mg/dL


(70-99) 208 mg/dL


(70-99)


 


Test


 10/13/20


05:15 10/13/20


08:00 


 





 


White Blood Count


 5.7 x10^3/uL


(4.0-11.0) 


 


 





 


Red Blood Count


 3.92 x10^6/uL


(3.50-5.40) 


 


 





 


Hemoglobin


 11.6 g/dL


(12.0-15.5) 


 


 





 


Hematocrit


 34.8 %


(36.0-47.0) 


 


 





 


Mean Corpuscular Volume 89 fL ()    


 


Mean Corpuscular Hemoglobin 30 pg (25-35)    


 


Mean Corpuscular Hemoglobin


Concent 33 g/dL


(31-37) 


 


 





 


Red Cell Distribution Width


 14.0 %


(11.5-14.5) 


 


 





 


Platelet Count


 203 x10^3/uL


(140-400) 


 


 





 


Sodium Level


 138 mmol/L


(136-145) 


 


 





 


Potassium Level


 4.1 mmol/L


(3.5-5.1) 


 


 





 


Chloride Level


 101 mmol/L


() 


 


 





 


Carbon Dioxide Level


 29 mmol/L


(21-32) 


 


 





 


Anion Gap 8 (6-14)    


 


Blood Urea Nitrogen


 19 mg/dL


(7-20) 


 


 





 


Creatinine


 0.8 mg/dL


(0.6-1.0) 


 


 





 


Estimated GFR


(Cockcroft-Gault) 77.9 


 


 


 





 


BUN/Creatinine Ratio 24 (6-20)    


 


Glucose Level


 270 mg/dL


(70-99) 


 


 





 


Calcium Level


 7.9 mg/dL


(8.5-10.1) 


 


 





 


Total Bilirubin


 0.3 mg/dL


(0.2-1.0) 


 


 





 


Aspartate Amino Transf


(AST/SGOT) 124 U/L


(15-37) 


 


 





 


Alanine Aminotransferase


(ALT/SGPT) 105 U/L


(14-59) 


 


 





 


Alkaline Phosphatase


 133 U/L


() 


 


 





 


Total Protein


 5.8 g/dL


(6.4-8.2) 


 


 





 


Albumin


 2.1 g/dL


(3.4-5.0) 


 


 





 


Albumin/Globulin Ratio 0.6 (1.0-1.7)    


 


O2 Saturation  98 % (92-99)   


 


Arterial Blood pH


 


 7.48


(7.35-7.45) 


 





 


Arterial Blood pCO2 at


Patient Temp 


 34 mmHg


(35-46) 


 





 


Arterial Blood pO2 at Patient


Temp 


 107 mmHg


() 


 





 


Arterial Blood HCO3


 


 25 mmol/L


(21-28) 


 





 


Arterial Blood Base Excess


 


 2 mmol/L


(-3-3) 


 





 


FiO2  100%+11   








Medications





Active Scripts








 Medications  Dose


 Route/Sig


 Max Daily Dose Days Date Category


 


 Cymbalta


  (Duloxetine Hcl)


 20 Mg Capsule.dr  20 Mg


 PO DAILY


   9/22/20 Reported


 


 Lisinopril 5 Mg


 Tablet  5 Mg


 PO DAILY


   9/22/20 Reported


 


 Amitriptyline Hcl


 10 Mg Tablet  10 Mg


 PO DAILY


   9/22/20 Reported


 


 Trulicity


  (Dulaglutide) 1.5


 Mg/0.5 Ml


 Pen.injctr  1.5 Mg


 SQ


   9/22/20 Reported


 


 Nesina


  (Alogliptin


 Benzoate) 12.5 Mg


 Tablet  12.5 Mg


 PO


   9/22/20 Reported


 


 Propranolol Hcl


 80 Mg Tablet  80 Mg


 PO DAILY


   5/14/14 Reported


 


 Cymbalta


  (Duloxetine Hcl)


 60 Mg Capsule.dr  60 Mg


 PO DAILY


   5/14/14 Reported








Comments


CXR


 


Impression:


Right subclavian venous catheter joints align slightly left of midline at 


the expected brachiocephalic vein region.


 


Tracheal tube is in place. Enteric tube in place.


 


Retrocardiac left basilar consolidation.


 


No pneumothorax.





Impression


.





IMPRESSION:


1.  Acute hypoxemic respiratory failure.-- S/P intubation on 10/12/20 


2.  COVID-19 viral pneumonia.


3.  Abnormal chest x-ray, possible gram-positive, gram-negative pneumonia.


4.  Headaches.


5.  Type 2 diabetes.


6.  Fibromyalgia.


7.  Morbid obesity.


8.  Nonalcoholic steatohepatitis.


9.  Obstructive sleep apnea.





Plan


.


Continue current vent support Fi02 at 90% and peep of 10, ABG reviewed changes 

made as adequate 


S/P plasma, cont. full course of remdesivir 


Cont. steroids 


ABX per ID 


Follow clinical course 


DVT/GI PPX; protonix and BID lovenox 





D/W RN and RT 





Critical care time from [id number] STEPH HARRIS MD                 Oct 13, 2020 11:01

## 2020-10-13 NOTE — PDOC
Infectious Disease Note


Subjective


Subjective


intubated on vent





ROS


ROS


no n/v/d/





Vital Sign


Vital Signs





Vital Signs








  Date Time  Temp Pulse Resp B/P (MAP) Pulse Ox O2 Delivery O2 Flow Rate FiO2


 


10/13/20 07:00  73 24 114/71 (85) 99 Ventilator  


 


10/13/20 04:00 98.8       





 98.8       


 


10/13/20 02:21       15.0 











Physical Exam


PHYSICAL EXAM


GENERAL:  intubated on vent


HEENT:  Normocephalic, atraumatic.  Anicteric.


NECK:  Supple.  No thyromegaly.


LUNGS:  Coarse breath sounds.  No wheezing.


HEART:  S1, S2, no murmurs.


ABDOMEN:  Obese, soft, nontender, bowel sounds present.


EXTREMITIES:  No edema, no cyanosis.


DERMATOLOGIC:  Warm, dry.  No generalized rash.  Multiple tattoos.


CENTRAL NERVOUS SYSTEM:  sedated intubated





Labs


Lab





Laboratory Tests








Test


 10/12/20


07:48 10/12/20


10:30 10/12/20


14:35 10/12/20


17:00


 


Glucose (Fingerstick)


 227 mg/dL


(70-99) 236 mg/dL


(70-99) 


 





 


O2 Saturation   80 % (92-99)  88 % (92-99) 


 


Arterial Blood pH


 


 


 7.41


(7.35-7.45) 7.51


(7.35-7.45)


 


Arterial Blood pCO2 at


Patient Temp 


 


 47 mmHg


(35-46) 28 mmHg


(35-46)


 


Arterial Blood pO2 at Patient


Temp 


 


 43 mmHg


() 49 mmHg


()


 


Arterial Blood HCO3


 


 


 29 mmol/L


(21-28) 22 mmol/L


(21-28)


 


Arterial Blood Base Excess


 


 


 4 mmol/L


(-3-3) 0 mmol/L


(-3-3)


 


Oxyhemoglobin   79.4 %  87.3 % 


 


Methemoglobin


 


 


 0.3 %


(0.0-1.9) 0.3 %


(0.0-1.9)


 


Carbon Monoxide, Quantitative


 


 


 0.5 %


(0.0-1.9) 0.3 %


(0.0-1.9)


 


FiO2   100% vapotherm  100% 


 


Test


 10/12/20


17:51 10/12/20


20:53 10/13/20


05:15 





 


Glucose (Fingerstick)


 218 mg/dL


(70-99) 208 mg/dL


(70-99) 


 





 


White Blood Count


 


 


 5.7 x10^3/uL


(4.0-11.0) 





 


Red Blood Count


 


 


 3.92 x10^6/uL


(3.50-5.40) 





 


Hemoglobin


 


 


 11.6 g/dL


(12.0-15.5) 





 


Hematocrit


 


 


 34.8 %


(36.0-47.0) 





 


Mean Corpuscular Volume   89 fL ()  


 


Mean Corpuscular Hemoglobin   30 pg (25-35)  


 


Mean Corpuscular Hemoglobin


Concent 


 


 33 g/dL


(31-37) 





 


Red Cell Distribution Width


 


 


 14.0 %


(11.5-14.5) 





 


Platelet Count


 


 


 203 x10^3/uL


(140-400) 





 


Sodium Level


 


 


 138 mmol/L


(136-145) 





 


Potassium Level


 


 


 4.1 mmol/L


(3.5-5.1) 





 


Chloride Level


 


 


 101 mmol/L


() 





 


Carbon Dioxide Level


 


 


 29 mmol/L


(21-32) 





 


Anion Gap   8 (6-14)  


 


Blood Urea Nitrogen


 


 


 19 mg/dL


(7-20) 





 


Creatinine


 


 


 0.8 mg/dL


(0.6-1.0) 





 


Estimated GFR


(Cockcroft-Gault) 


 


 77.9 


 





 


BUN/Creatinine Ratio   24 (6-20)  


 


Glucose Level


 


 


 270 mg/dL


(70-99) 





 


Calcium Level


 


 


 7.9 mg/dL


(8.5-10.1) 





 


Total Bilirubin


 


 


 0.3 mg/dL


(0.2-1.0) 





 


Aspartate Amino Transf


(AST/SGOT) 


 


 124 U/L


(15-37) 





 


Alanine Aminotransferase


(ALT/SGPT) 


 


 105 U/L


(14-59) 





 


Alkaline Phosphatase


 


 


 133 U/L


() 





 


Total Protein


 


 


 5.8 g/dL


(6.4-8.2) 





 


Albumin


 


 


 2.1 g/dL


(3.4-5.0) 





 


Albumin/Globulin Ratio   0.6 (1.0-1.7)  











Objective


Assessment


1.  Acute hypoxic respiratory failure.


2.  COVID-19 Pneumonia


3.  Migraine headaches.


4.  Diabetes.


5.  Obesity.


6.  Fibromyalgia.


7.  Abnormal LFTs.





Plan


Plan of Care


cont steroids


cont Remdesivir


supportive care











HEDY BRAND MD               Oct 13, 2020 07:34

## 2020-10-14 VITALS — SYSTOLIC BLOOD PRESSURE: 124 MMHG | DIASTOLIC BLOOD PRESSURE: 69 MMHG

## 2020-10-14 VITALS — SYSTOLIC BLOOD PRESSURE: 120 MMHG | DIASTOLIC BLOOD PRESSURE: 64 MMHG

## 2020-10-14 VITALS — DIASTOLIC BLOOD PRESSURE: 68 MMHG | SYSTOLIC BLOOD PRESSURE: 127 MMHG

## 2020-10-14 VITALS — DIASTOLIC BLOOD PRESSURE: 70 MMHG | SYSTOLIC BLOOD PRESSURE: 130 MMHG

## 2020-10-14 VITALS — DIASTOLIC BLOOD PRESSURE: 60 MMHG | SYSTOLIC BLOOD PRESSURE: 110 MMHG

## 2020-10-14 VITALS — DIASTOLIC BLOOD PRESSURE: 63 MMHG | SYSTOLIC BLOOD PRESSURE: 109 MMHG

## 2020-10-14 VITALS — SYSTOLIC BLOOD PRESSURE: 105 MMHG | DIASTOLIC BLOOD PRESSURE: 56 MMHG

## 2020-10-14 VITALS — SYSTOLIC BLOOD PRESSURE: 130 MMHG | DIASTOLIC BLOOD PRESSURE: 70 MMHG

## 2020-10-14 VITALS — SYSTOLIC BLOOD PRESSURE: 144 MMHG | DIASTOLIC BLOOD PRESSURE: 78 MMHG

## 2020-10-14 VITALS — SYSTOLIC BLOOD PRESSURE: 118 MMHG | DIASTOLIC BLOOD PRESSURE: 62 MMHG

## 2020-10-14 VITALS — SYSTOLIC BLOOD PRESSURE: 132 MMHG | DIASTOLIC BLOOD PRESSURE: 70 MMHG

## 2020-10-14 VITALS — DIASTOLIC BLOOD PRESSURE: 60 MMHG | SYSTOLIC BLOOD PRESSURE: 102 MMHG

## 2020-10-14 VITALS — DIASTOLIC BLOOD PRESSURE: 56 MMHG | SYSTOLIC BLOOD PRESSURE: 105 MMHG

## 2020-10-14 VITALS — SYSTOLIC BLOOD PRESSURE: 104 MMHG | DIASTOLIC BLOOD PRESSURE: 59 MMHG

## 2020-10-14 VITALS — DIASTOLIC BLOOD PRESSURE: 71 MMHG | SYSTOLIC BLOOD PRESSURE: 134 MMHG

## 2020-10-14 VITALS — DIASTOLIC BLOOD PRESSURE: 68 MMHG | SYSTOLIC BLOOD PRESSURE: 132 MMHG

## 2020-10-14 VITALS — SYSTOLIC BLOOD PRESSURE: 101 MMHG | DIASTOLIC BLOOD PRESSURE: 58 MMHG

## 2020-10-14 VITALS — DIASTOLIC BLOOD PRESSURE: 60 MMHG | SYSTOLIC BLOOD PRESSURE: 112 MMHG

## 2020-10-14 VITALS — DIASTOLIC BLOOD PRESSURE: 64 MMHG | SYSTOLIC BLOOD PRESSURE: 111 MMHG

## 2020-10-14 VITALS — SYSTOLIC BLOOD PRESSURE: 122 MMHG | DIASTOLIC BLOOD PRESSURE: 60 MMHG

## 2020-10-14 LAB
ALBUMIN SERPL-MCNC: 2 G/DL (ref 3.4–5)
ALBUMIN/GLOB SERPL: 0.6 {RATIO} (ref 1–1.7)
ALP SERPL-CCNC: 221 U/L (ref 46–116)
ALT SERPL-CCNC: 391 U/L (ref 14–59)
ANION GAP SERPL CALC-SCNC: 4 MMOL/L (ref 6–14)
AST SERPL-CCNC: 849 U/L (ref 15–37)
BASE EXCESS ABG: -1 MMOL/L (ref -3–3)
BILIRUB SERPL-MCNC: 0.3 MG/DL (ref 0.2–1)
BUN SERPL-MCNC: 21 MG/DL (ref 7–20)
BUN/CREAT SERPL: 30 (ref 6–20)
CALCIUM SERPL-MCNC: 7.7 MG/DL (ref 8.5–10.1)
CHLORIDE SERPL-SCNC: 105 MMOL/L (ref 98–107)
CO2 SERPL-SCNC: 30 MMOL/L (ref 21–32)
CREAT SERPL-MCNC: 0.7 MG/DL (ref 0.6–1)
ERYTHROCYTE [DISTWIDTH] IN BLOOD BY AUTOMATED COUNT: 14.1 % (ref 11.5–14.5)
GFR SERPLBLD BASED ON 1.73 SQ M-ARVRAT: 90.9 ML/MIN
GLUCOSE SERPL-MCNC: 326 MG/DL (ref 70–99)
HCO3 BLDA-SCNC: 23 MMOL/L (ref 21–28)
HCT VFR BLD CALC: 35.5 % (ref 36–47)
HGB BLD-MCNC: 11.8 G/DL (ref 12–15.5)
INSPIRATION SETTING TIME VENT: (no result)
MCH RBC QN AUTO: 30 PG (ref 25–35)
MCHC RBC AUTO-ENTMCNC: 33 G/DL (ref 31–37)
MCV RBC AUTO: 89 FL (ref 79–100)
PCO2 BLDA: 39 MMHG (ref 35–46)
PLATELET # BLD AUTO: 211 X10^3/UL (ref 140–400)
PO2 BLDA: 70 MMHG (ref 75–108)
POTASSIUM SERPL-SCNC: 4.6 MMOL/L (ref 3.5–5.1)
PROT SERPL-MCNC: 5.1 G/DL (ref 6.4–8.2)
RBC # BLD AUTO: 3.97 X10^6/UL (ref 3.5–5.4)
SAO2 % BLDA: 93 % (ref 92–99)
SODIUM SERPL-SCNC: 139 MMOL/L (ref 136–145)
WBC # BLD AUTO: 4.4 X10^3/UL (ref 4–11)

## 2020-10-14 RX ADMIN — VERAPAMIL HYDROCHLORIDE SCH MG: 120 TABLET, FILM COATED, EXTENDED RELEASE ORAL at 08:18

## 2020-10-14 RX ADMIN — INSULIN LISPRO SCH UNITS: 100 INJECTION, SOLUTION INTRAVENOUS; SUBCUTANEOUS at 00:03

## 2020-10-14 RX ADMIN — ENOXAPARIN SODIUM SCH MG: 40 INJECTION SUBCUTANEOUS at 08:17

## 2020-10-14 RX ADMIN — VECURONIUM BROMIDE PRN MG: 1 INJECTION, POWDER, LYOPHILIZED, FOR SOLUTION INTRAVENOUS at 06:45

## 2020-10-14 RX ADMIN — INSULIN LISPRO SCH UNITS: 100 INJECTION, SOLUTION INTRAVENOUS; SUBCUTANEOUS at 18:40

## 2020-10-14 RX ADMIN — INSULIN LISPRO SCH UNITS: 100 INJECTION, SOLUTION INTRAVENOUS; SUBCUTANEOUS at 11:58

## 2020-10-14 RX ADMIN — INSULIN LISPRO SCH UNITS: 100 INJECTION, SOLUTION INTRAVENOUS; SUBCUTANEOUS at 05:03

## 2020-10-14 RX ADMIN — VERAPAMIL HYDROCHLORIDE SCH MG: 120 TABLET, FILM COATED, EXTENDED RELEASE ORAL at 20:37

## 2020-10-14 RX ADMIN — VECURONIUM BROMIDE PRN MG: 1 INJECTION, POWDER, LYOPHILIZED, FOR SOLUTION INTRAVENOUS at 04:38

## 2020-10-14 RX ADMIN — Medication SCH MLS/HR: at 12:04

## 2020-10-14 RX ADMIN — INSULIN LISPRO SCH UNITS: 100 INJECTION, SOLUTION INTRAVENOUS; SUBCUTANEOUS at 00:04

## 2020-10-14 RX ADMIN — LINAGLIPTIN SCH MG: 5 TABLET, FILM COATED ORAL at 08:17

## 2020-10-14 RX ADMIN — INSULIN GLARGINE SCH UNIT: 100 INJECTION, SOLUTION SUBCUTANEOUS at 20:41

## 2020-10-14 RX ADMIN — METHYLPREDNISOLONE SODIUM SUCCINATE SCH MG: 40 INJECTION, POWDER, FOR SOLUTION INTRAMUSCULAR; INTRAVENOUS at 05:01

## 2020-10-14 RX ADMIN — LISINOPRIL SCH MG: 5 TABLET ORAL at 08:23

## 2020-10-14 RX ADMIN — INSULIN GLARGINE SCH UNIT: 100 INJECTION, SOLUTION SUBCUTANEOUS at 09:00

## 2020-10-14 RX ADMIN — INSULIN LISPRO SCH UNITS: 100 INJECTION, SOLUTION INTRAVENOUS; SUBCUTANEOUS at 18:42

## 2020-10-14 RX ADMIN — METHYLPREDNISOLONE SODIUM SUCCINATE SCH MG: 40 INJECTION, POWDER, FOR SOLUTION INTRAMUSCULAR; INTRAVENOUS at 15:12

## 2020-10-14 RX ADMIN — MIDAZOLAM PRN MLS/HR: 5 INJECTION, SOLUTION INTRAMUSCULAR; INTRAVENOUS at 02:41

## 2020-10-14 RX ADMIN — ENOXAPARIN SODIUM SCH MG: 40 INJECTION SUBCUTANEOUS at 20:38

## 2020-10-14 RX ADMIN — CEFTRIAXONE SCH GM: 1 INJECTION, POWDER, FOR SOLUTION INTRAMUSCULAR; INTRAVENOUS at 11:29

## 2020-10-14 RX ADMIN — MIDAZOLAM PRN MLS/HR: 5 INJECTION, SOLUTION INTRAMUSCULAR; INTRAVENOUS at 13:20

## 2020-10-14 RX ADMIN — INSULIN LISPRO SCH UNITS: 100 INJECTION, SOLUTION INTRAVENOUS; SUBCUTANEOUS at 08:45

## 2020-10-14 RX ADMIN — PANTOPRAZOLE SODIUM SCH MG: 40 INJECTION, POWDER, FOR SOLUTION INTRAVENOUS at 08:16

## 2020-10-14 RX ADMIN — INSULIN LISPRO SCH UNITS: 100 INJECTION, SOLUTION INTRAVENOUS; SUBCUTANEOUS at 11:56

## 2020-10-14 RX ADMIN — INSULIN LISPRO SCH UNITS: 100 INJECTION, SOLUTION INTRAVENOUS; SUBCUTANEOUS at 05:09

## 2020-10-14 RX ADMIN — MIDAZOLAM PRN MLS/HR: 5 INJECTION, SOLUTION INTRAMUSCULAR; INTRAVENOUS at 23:24

## 2020-10-14 RX ADMIN — METHYLPREDNISOLONE SODIUM SUCCINATE SCH MG: 40 INJECTION, POWDER, FOR SOLUTION INTRAMUSCULAR; INTRAVENOUS at 22:14

## 2020-10-14 NOTE — PDOC
PULMONARY PROGRESS NOTES


DATE: 10/14/20 


TIME: 11:09


Subjective


Patient was intubated on 10/12/20 for increased resp. distress 


remains intubated/ sedated 


No overnight concerns from nursing


Vitals





Vital Signs








  Date Time  Temp Pulse Resp B/P (MAP) Pulse Ox O2 Delivery O2 Flow Rate FiO2


 


10/14/20 08:51     94 Ventilator  


 


10/14/20 08:23    128/68    


 


10/14/20 06:00  67 20     


 


10/14/20 04:00 98.1       





 98.1       








Comments


Pt. is seen during covid 19 pandemic, visual exam preformed 


RRR


intubated 


no edema or rash noted


Labs





Laboratory Tests








Test


 10/12/20


14:35 10/12/20


17:00 10/12/20


17:51 10/12/20


20:53


 


O2 Saturation 80 % (92-99)  88 % (92-99)   


 


Arterial Blood pH


 7.41


(7.35-7.45) 7.51


(7.35-7.45) 


 





 


Arterial Blood pCO2 at


Patient Temp 47 mmHg


(35-46) 28 mmHg


(35-46) 


 





 


Arterial Blood pO2 at Patient


Temp 43 mmHg


() 49 mmHg


() 


 





 


Arterial Blood HCO3


 29 mmol/L


(21-28) 22 mmol/L


(21-28) 


 





 


Arterial Blood Base Excess


 4 mmol/L


(-3-3) 0 mmol/L


(-3-3) 


 





 


Oxyhemoglobin 79.4 %  87.3 %   


 


Methemoglobin


 0.3 %


(0.0-1.9) 0.3 %


(0.0-1.9) 


 





 


Carbon Monoxide, Quantitative


 0.5 %


(0.0-1.9) 0.3 %


(0.0-1.9) 


 





 


FiO2 100% vapotherm  100%   


 


Glucose (Fingerstick)


 


 


 218 mg/dL


(70-99) 208 mg/dL


(70-99)


 


Test


 10/13/20


05:15 10/13/20


08:00 10/13/20


11:30 10/13/20


18:01


 


White Blood Count


 5.7 x10^3/uL


(4.0-11.0) 


 


 





 


Red Blood Count


 3.92 x10^6/uL


(3.50-5.40) 


 


 





 


Hemoglobin


 11.6 g/dL


(12.0-15.5) 


 


 





 


Hematocrit


 34.8 %


(36.0-47.0) 


 


 





 


Mean Corpuscular Volume 89 fL ()    


 


Mean Corpuscular Hemoglobin 30 pg (25-35)    


 


Mean Corpuscular Hemoglobin


Concent 33 g/dL


(31-37) 


 


 





 


Red Cell Distribution Width


 14.0 %


(11.5-14.5) 


 


 





 


Platelet Count


 203 x10^3/uL


(140-400) 


 


 





 


Sodium Level


 138 mmol/L


(136-145) 


 


 





 


Potassium Level


 4.1 mmol/L


(3.5-5.1) 


 


 





 


Chloride Level


 101 mmol/L


() 


 


 





 


Carbon Dioxide Level


 29 mmol/L


(21-32) 


 


 





 


Anion Gap 8 (6-14)    


 


Blood Urea Nitrogen


 19 mg/dL


(7-20) 


 


 





 


Creatinine


 0.8 mg/dL


(0.6-1.0) 


 


 





 


Estimated GFR


(Cockcroft-Gault) 77.9 


 


 


 





 


BUN/Creatinine Ratio 24 (6-20)    


 


Glucose Level


 270 mg/dL


(70-99) 


 


 





 


Calcium Level


 7.9 mg/dL


(8.5-10.1) 


 


 





 


Total Bilirubin


 0.3 mg/dL


(0.2-1.0) 


 


 





 


Aspartate Amino Transf


(AST/SGOT) 124 U/L


(15-37) 


 


 





 


Alanine Aminotransferase


(ALT/SGPT) 105 U/L


(14-59) 


 


 





 


Alkaline Phosphatase


 133 U/L


() 


 


 





 


Total Protein


 5.8 g/dL


(6.4-8.2) 


 


 





 


Albumin


 2.1 g/dL


(3.4-5.0) 


 


 





 


Albumin/Globulin Ratio 0.6 (1.0-1.7)    


 


O2 Saturation  98 % (92-99)   


 


Arterial Blood pH


 


 7.48


(7.35-7.45) 


 





 


Arterial Blood pCO2 at


Patient Temp 


 34 mmHg


(35-46) 


 





 


Arterial Blood pO2 at Patient


Temp 


 107 mmHg


() 


 





 


Arterial Blood HCO3


 


 25 mmol/L


(21-28) 


 





 


Arterial Blood Base Excess


 


 2 mmol/L


(-3-3) 


 





 


FiO2  100%+11   


 


Glucose (Fingerstick)


 


 


 300 mg/dL


(70-99) 283 mg/dL


(70-99)


 


Test


 10/14/20


00:01 10/14/20


04:55 10/14/20


04:57 10/14/20


08:00


 


Glucose (Fingerstick)


 307 mg/dL


(70-99) 


 314 mg/dL


(70-99) 





 


White Blood Count


 


 4.4 x10^3/uL


(4.0-11.0) 


 





 


Red Blood Count


 


 3.97 x10^6/uL


(3.50-5.40) 


 





 


Hemoglobin


 


 11.8 g/dL


(12.0-15.5) 


 





 


Hematocrit


 


 35.5 %


(36.0-47.0) 


 





 


Mean Corpuscular Volume  89 fL ()   


 


Mean Corpuscular Hemoglobin  30 pg (25-35)   


 


Mean Corpuscular Hemoglobin


Concent 


 33 g/dL


(31-37) 


 





 


Red Cell Distribution Width


 


 14.1 %


(11.5-14.5) 


 





 


Platelet Count


 


 211 x10^3/uL


(140-400) 


 





 


Sodium Level


 


 139 mmol/L


(136-145) 


 





 


Potassium Level


 


 4.6 mmol/L


(3.5-5.1) 


 





 


Chloride Level


 


 105 mmol/L


() 


 





 


Carbon Dioxide Level


 


 30 mmol/L


(21-32) 


 





 


Anion Gap  4 (6-14)   


 


Blood Urea Nitrogen


 


 21 mg/dL


(7-20) 


 





 


Creatinine


 


 0.7 mg/dL


(0.6-1.0) 


 





 


Estimated GFR


(Cockcroft-Gault) 


 90.9 


 


 





 


BUN/Creatinine Ratio  30 (6-20)   


 


Glucose Level


 


 326 mg/dL


(70-99) 


 





 


Calcium Level


 


 7.7 mg/dL


(8.5-10.1) 


 





 


Total Bilirubin


 


 0.3 mg/dL


(0.2-1.0) 


 





 


Aspartate Amino Transf


(AST/SGOT) 


 849 U/L


(15-37) 


 





 


Alanine Aminotransferase


(ALT/SGPT) 


 391 U/L


(14-59) 


 





 


Alkaline Phosphatase


 


 221 U/L


() 


 





 


Total Protein


 


 5.1 g/dL


(6.4-8.2) 


 





 


Albumin


 


 2.0 g/dL


(3.4-5.0) 


 





 


Albumin/Globulin Ratio  0.6 (1.0-1.7)   


 


O2 Saturation    93 % (92-99) 


 


Arterial Blood pH


 


 


 


 7.40


(7.35-7.45)


 


Arterial Blood pCO2 at


Patient Temp 


 


 


 39 mmHg


(35-46)


 


Arterial Blood pO2 at Patient


Temp 


 


 


 70 mmHg


()


 


Arterial Blood HCO3


 


 


 


 23 mmol/L


(21-28)


 


Arterial Blood Base Excess


 


 


 


 -1 mmol/L


(-3-3)


 


FiO2    100/vent 








Laboratory Tests








Test


 10/13/20


11:30 10/13/20


18:01 10/14/20


00:01 10/14/20


04:55


 


Glucose (Fingerstick)


 300 mg/dL


(70-99) 283 mg/dL


(70-99) 307 mg/dL


(70-99) 





 


White Blood Count


 


 


 


 4.4 x10^3/uL


(4.0-11.0)


 


Red Blood Count


 


 


 


 3.97 x10^6/uL


(3.50-5.40)


 


Hemoglobin


 


 


 


 11.8 g/dL


(12.0-15.5)


 


Hematocrit


 


 


 


 35.5 %


(36.0-47.0)


 


Mean Corpuscular Volume    89 fL () 


 


Mean Corpuscular Hemoglobin    30 pg (25-35) 


 


Mean Corpuscular Hemoglobin


Concent 


 


 


 33 g/dL


(31-37)


 


Red Cell Distribution Width


 


 


 


 14.1 %


(11.5-14.5)


 


Platelet Count


 


 


 


 211 x10^3/uL


(140-400)


 


Sodium Level


 


 


 


 139 mmol/L


(136-145)


 


Potassium Level


 


 


 


 4.6 mmol/L


(3.5-5.1)


 


Chloride Level


 


 


 


 105 mmol/L


()


 


Carbon Dioxide Level


 


 


 


 30 mmol/L


(21-32)


 


Anion Gap    4 (6-14) 


 


Blood Urea Nitrogen


 


 


 


 21 mg/dL


(7-20)


 


Creatinine


 


 


 


 0.7 mg/dL


(0.6-1.0)


 


Estimated GFR


(Cockcroft-Gault) 


 


 


 90.9 





 


BUN/Creatinine Ratio    30 (6-20) 


 


Glucose Level


 


 


 


 326 mg/dL


(70-99)


 


Calcium Level


 


 


 


 7.7 mg/dL


(8.5-10.1)


 


Total Bilirubin


 


 


 


 0.3 mg/dL


(0.2-1.0)


 


Aspartate Amino Transf


(AST/SGOT) 


 


 


 849 U/L


(15-37)


 


Alanine Aminotransferase


(ALT/SGPT) 


 


 


 391 U/L


(14-59)


 


Alkaline Phosphatase


 


 


 


 221 U/L


()


 


Total Protein


 


 


 


 5.1 g/dL


(6.4-8.2)


 


Albumin


 


 


 


 2.0 g/dL


(3.4-5.0)


 


Albumin/Globulin Ratio    0.6 (1.0-1.7) 


 


Test


 10/14/20


04:57 10/14/20


08:00 


 





 


Glucose (Fingerstick)


 314 mg/dL


(70-99) 


 


 





 


O2 Saturation  93 % (92-99)   


 


Arterial Blood pH


 


 7.40


(7.35-7.45) 


 





 


Arterial Blood pCO2 at


Patient Temp 


 39 mmHg


(35-46) 


 





 


Arterial Blood pO2 at Patient


Temp 


 70 mmHg


() 


 





 


Arterial Blood HCO3


 


 23 mmol/L


(21-28) 


 





 


Arterial Blood Base Excess


 


 -1 mmol/L


(-3-3) 


 





 


FiO2  100/vent   








Medications





Active Scripts








 Medications  Dose


 Route/Sig


 Max Daily Dose Days Date Category


 


 Cymbalta


  (Duloxetine Hcl)


 20 Mg Capsule.dr  20 Mg


 PO DAILY


   9/22/20 Reported


 


 Lisinopril 5 Mg


 Tablet  5 Mg


 PO DAILY


   9/22/20 Reported


 


 Amitriptyline Hcl


 10 Mg Tablet  10 Mg


 PO DAILY


   9/22/20 Reported


 


 Trulicity


  (Dulaglutide) 1.5


 Mg/0.5 Ml


 Pen.injctr  1.5 Mg


 SQ


   9/22/20 Reported


 


 Nesina


  (Alogliptin


 Benzoate) 12.5 Mg


 Tablet  12.5 Mg


 PO


   9/22/20 Reported


 


 Propranolol Hcl


 80 Mg Tablet  80 Mg


 PO DAILY


   5/14/14 Reported


 


 Cymbalta


  (Duloxetine Hcl)


 60 Mg Capsule.dr  60 Mg


 PO DAILY


   5/14/14 Reported








Comments


CXR 10/14


 


1. Unchanged bilateral pulmonary opacities.


2. Unchanged position of right subclavian central venous catheter with tip


crossing midline and terminating in the expected region of the left 


brachiocephalic vein.


 


Electronically signed by: Maria Luisa Richter MD (10/14/2020 7:12 AM) [id number]











Impression


.





IMPRESSION:


1.  Acute hypoxemic respiratory failure.-- S/P intubation on 10/12/20 


2.  COVID-19 viral pneumonia.


3.  Abnormal chest x-ray, possible gram-positive, gram-negative pneumonia.


4.  Headaches.


5.  Type 2 diabetes.


6.  Fibromyalgia.


7.  Morbid obesity.


8.  Nonalcoholic steatohepatitis.


9.  Obstructive sleep apnea.





Plan


.


Continue current vent support Fi02 at 100% and peep of 11, ABG reviewed . no 

changes


S/P plasma, cont. full course of remdesivir 


Cont. steroids 


ABX per ID 


Follow clinical course 


DVT/GI PPX; protonix and BID lovenox 





D/W RN and RT 





Critical care time from  am











STEPH ROWE MD                 Oct 14, 2020 11:12

## 2020-10-14 NOTE — NUR
Starting at 0615, patient SpO2 would drop to 82% then recover back to 93%. Patient did this 
for a third time at 0645, plateauing at 82% and not recovering. PRN vec dose given early. 
Patient responded very well to this with SpO2 climbing back to 97%. Will pass this 
information on in report.

## 2020-10-14 NOTE — PN
DATE:  



SUBJECTIVE:  The patient is resting, slightly propped up in bed comfortably,

intubated, mechanical ventilation, maintaining her oxygen saturation 93% on FiO2

of 100%.  Nursing staff stated that she has a rough night and has been

desaturating multiple times.  She is now on vecuronium, midazolam, propofol and

fentanyl.



PHYSICAL EXAMINATION:

GENERAL:  When I examined her, she was pale.  No jaundice, cyanosis or

thyromegaly.  No jugular venous distention.  No limb edema.

VITAL SIGNS:  Her heart rate was 67, blood pressure was 109/63, temperature was

98.1, respiratory rate 20, and oxygen saturation was 94% on FiO2 of 100%.

HEAD, EYES, EARS, NOSE AND THROAT:  Normocephalic, atraumatic.  She has

orotracheal and orogastric tube in place.

NECK:  Supple.

CARDIAC:  Normal first and second heart sounds.  No gallop, rub or murmur.

CHEST:  Showed central trachea, equal bilateral chest expansion, air entry,

vesicular sounds.  No crepitation or rhonchi.

ABDOMEN:  Distended, soft, nontender.

NEUROLOGIC:  She is heavily sedated.



Her intake over the last 24 hours was 580, output was 2025.



LABORATORY DATA:  Her lab work as of this morning showed a white cell count of

4400, hemoglobin 11.8, hematocrit 35.5, MCV 89 and platelet count 211,000.  Her

chemistry showed a serum sodium 139, potassium 4.6, chloride 105, bicarbonate

30, anion gap of 4, BUN 21, creatinine 0.7, estimated GFR was 90 mL per minute. 

Her glucose was 324, calcium was 7.7.  Total bilirubin is normal.  AST, ALT,

alkaline phosphatase are trending upward.  Her total protein 5.1, albumin 2. 

Her D-dimer was 1.96.



ASSESSMENT:

1.  COVID-19 pneumonia.

2.  Acute hypoxic respiratory failure, requiring intubation and mechanical

ventilation, maintaining her oxygen saturation at 93% on FiO2 of 100%.

3.  Other medical problems include:

A. Migraine headache.

B.  Type 2 diabetes that is suboptimally controlled.

C.  Fibromyalgia.

D.  Hypertension.

E.  Nephrolithiasis.

F.  Nonalcoholic steatohepatitis with worsening liver enzymes.

G.  Obstructive sleep apnea.



PLAN:  To continue with IV antibiotic.  Continue with IV Solu-Medrol.  The

patient was treated with remdesivir as well as convalescent plasma.  We will

continue with DVT and GI prophylaxis.  Continue to monitor her blood sugar and I

will adjust her insulin as her blood sugar is continued to be poorly controlled.

 



______________________________

JULIO CISSE MD



DR:  [id number]  JOB#:  [id number] / [id number]

DD:  10/14/2020 08:39  DT:  10/14/2020 09:25

## 2020-10-14 NOTE — RAD
EXAM: CHEST AP ONLY 10/14/2020 5:00 AM

 

CLINICAL INDICATION:Covid

 

COMPARISON:Chest radiograph 10/12/2020

 

TECHNIQUE:AP semiupright view of the chest

 

FINDINGS:The endotracheal tube terminates 5.2 cm above the hiren. NG tube

enters the stomach and terminates out of view. A right subclavian central 

venous catheter continues to course across midline and terminate over the 

left brachiocephalic vein. The cardiomediastinal silhouette is normal. 

Patchy bilateral pulmonary opacities are unchanged. No pleural effusion or

pneumothorax.

 

IMPRESSION:

1. Unchanged bilateral pulmonary opacities.

2. Unchanged position of right subclavian central venous catheter with tip

crossing midline and terminating in the expected region of the left 

brachiocephalic vein.

 

Electronically signed by: Maria Luisa Richter MD (10/14/2020 7:12 AM) [id number]

## 2020-10-14 NOTE — PDOC
Infectious Disease Note


Subjective


Subjective


intubated on vent





ROS


ROS


Sedated on vent





Vital Sign


Vital Signs





Vital Signs








  Date Time  Temp Pulse Resp B/P (MAP) Pulse Ox O2 Delivery O2 Flow Rate FiO2


 


10/14/20 06:00  67 20 109/63 (78) 94 Ventilator  


 


10/14/20 04:00 98.1       





 98.1       











Physical Exam


PHYSICAL EXAM


GENERAL:  intubated on vent


HEENT:  Normocephalic, atraumatic.  Anicteric.


NECK:  Supple.  No thyromegaly.


LUNGS:  Coarse breath sounds.  No wheezing.


HEART:  S1, S2, no murmurs.


ABDOMEN:  Obese, soft, nontender, bowel sounds present.


EXTREMITIES:  No edema, no cyanosis.


DERMATOLOGIC:  Warm, dry.  No generalized rash.  Multiple tattoos.


CENTRAL NERVOUS SYSTEM:  sedated intubated





Labs


Lab





Laboratory Tests








Test


 10/13/20


08:00 10/13/20


11:30 10/13/20


18:01 10/14/20


00:01


 


O2 Saturation 98 % (92-99)    


 


Arterial Blood pH


 7.48


(7.35-7.45) 


 


 





 


Arterial Blood pCO2 at


Patient Temp 34 mmHg


(35-46) 


 


 





 


Arterial Blood pO2 at Patient


Temp 107 mmHg


() 


 


 





 


Arterial Blood HCO3


 25 mmol/L


(21-28) 


 


 





 


Arterial Blood Base Excess


 2 mmol/L


(-3-3) 


 


 





 


FiO2 100%+11    


 


Glucose (Fingerstick)


 


 300 mg/dL


(70-99) 283 mg/dL


(70-99) 307 mg/dL


(70-99)


 


Test


 10/14/20


04:55 10/14/20


04:57 


 





 


White Blood Count


 4.4 x10^3/uL


(4.0-11.0) 


 


 





 


Red Blood Count


 3.97 x10^6/uL


(3.50-5.40) 


 


 





 


Hemoglobin


 11.8 g/dL


(12.0-15.5) 


 


 





 


Hematocrit


 35.5 %


(36.0-47.0) 


 


 





 


Mean Corpuscular Volume 89 fL ()    


 


Mean Corpuscular Hemoglobin 30 pg (25-35)    


 


Mean Corpuscular Hemoglobin


Concent 33 g/dL


(31-37) 


 


 





 


Red Cell Distribution Width


 14.1 %


(11.5-14.5) 


 


 





 


Platelet Count


 211 x10^3/uL


(140-400) 


 


 





 


Sodium Level


 139 mmol/L


(136-145) 


 


 





 


Potassium Level


 4.6 mmol/L


(3.5-5.1) 


 


 





 


Chloride Level


 105 mmol/L


() 


 


 





 


Carbon Dioxide Level


 30 mmol/L


(21-32) 


 


 





 


Anion Gap 4 (6-14)    


 


Blood Urea Nitrogen


 21 mg/dL


(7-20) 


 


 





 


Creatinine


 0.7 mg/dL


(0.6-1.0) 


 


 





 


Estimated GFR


(Cockcroft-Gault) 90.9 


 


 


 





 


BUN/Creatinine Ratio 30 (6-20)    


 


Glucose Level


 326 mg/dL


(70-99) 


 


 





 


Calcium Level


 7.7 mg/dL


(8.5-10.1) 


 


 





 


Total Bilirubin


 0.3 mg/dL


(0.2-1.0) 


 


 





 


Aspartate Amino Transf


(AST/SGOT) 849 U/L


(15-37) 


 


 





 


Alanine Aminotransferase


(ALT/SGPT) 391 U/L


(14-59) 


 


 





 


Alkaline Phosphatase


 221 U/L


() 


 


 





 


Total Protein


 5.1 g/dL


(6.4-8.2) 


 


 





 


Albumin


 2.0 g/dL


(3.4-5.0) 


 


 





 


Albumin/Globulin Ratio 0.6 (1.0-1.7)    


 


Glucose (Fingerstick)


 


 314 mg/dL


(70-99) 


 














Objective


Assessment


1.  Acute hypoxic respiratory failure.


2.  COVID-19 Pneumonia


3.  Migraine headaches.


4.  Diabetes.


5.  Obesity.


6.  Fibromyalgia.


7.  Abnormal LFTs.





Plan


Plan of Care


cont steroids


cont Remdesivir


supportive care











HEDY BRAND MD               Oct 14, 2020 07:24

## 2020-10-14 NOTE — NUR
SS following up with discharge planning. SS reviewed pt chart and discussed with pt RN. Pt 
remains on the vent at this time. COVID19 positive. Pt on IV Rocephin and Remdesivir. SS 
will continue to follow for discharge planning.

## 2020-10-15 VITALS — SYSTOLIC BLOOD PRESSURE: 121 MMHG | DIASTOLIC BLOOD PRESSURE: 67 MMHG

## 2020-10-15 VITALS — DIASTOLIC BLOOD PRESSURE: 86 MMHG | SYSTOLIC BLOOD PRESSURE: 150 MMHG

## 2020-10-15 VITALS — DIASTOLIC BLOOD PRESSURE: 72 MMHG | SYSTOLIC BLOOD PRESSURE: 134 MMHG

## 2020-10-15 VITALS — DIASTOLIC BLOOD PRESSURE: 79 MMHG | SYSTOLIC BLOOD PRESSURE: 94 MMHG

## 2020-10-15 VITALS — SYSTOLIC BLOOD PRESSURE: 115 MMHG | DIASTOLIC BLOOD PRESSURE: 69 MMHG

## 2020-10-15 VITALS — SYSTOLIC BLOOD PRESSURE: 100 MMHG | DIASTOLIC BLOOD PRESSURE: 92 MMHG

## 2020-10-15 VITALS — DIASTOLIC BLOOD PRESSURE: 65 MMHG | SYSTOLIC BLOOD PRESSURE: 118 MMHG

## 2020-10-15 VITALS — SYSTOLIC BLOOD PRESSURE: 114 MMHG | DIASTOLIC BLOOD PRESSURE: 72 MMHG

## 2020-10-15 VITALS — SYSTOLIC BLOOD PRESSURE: 129 MMHG | DIASTOLIC BLOOD PRESSURE: 70 MMHG

## 2020-10-15 VITALS — DIASTOLIC BLOOD PRESSURE: 85 MMHG | SYSTOLIC BLOOD PRESSURE: 144 MMHG

## 2020-10-15 VITALS — SYSTOLIC BLOOD PRESSURE: 124 MMHG | DIASTOLIC BLOOD PRESSURE: 72 MMHG

## 2020-10-15 VITALS — SYSTOLIC BLOOD PRESSURE: 140 MMHG | DIASTOLIC BLOOD PRESSURE: 80 MMHG

## 2020-10-15 VITALS — DIASTOLIC BLOOD PRESSURE: 79 MMHG | SYSTOLIC BLOOD PRESSURE: 138 MMHG

## 2020-10-15 VITALS — DIASTOLIC BLOOD PRESSURE: 64 MMHG | SYSTOLIC BLOOD PRESSURE: 120 MMHG

## 2020-10-15 VITALS — DIASTOLIC BLOOD PRESSURE: 69 MMHG | SYSTOLIC BLOOD PRESSURE: 113 MMHG

## 2020-10-15 VITALS — DIASTOLIC BLOOD PRESSURE: 72 MMHG | SYSTOLIC BLOOD PRESSURE: 128 MMHG

## 2020-10-15 VITALS — DIASTOLIC BLOOD PRESSURE: 70 MMHG | SYSTOLIC BLOOD PRESSURE: 128 MMHG

## 2020-10-15 VITALS — DIASTOLIC BLOOD PRESSURE: 66 MMHG | SYSTOLIC BLOOD PRESSURE: 125 MMHG

## 2020-10-15 VITALS — DIASTOLIC BLOOD PRESSURE: 78 MMHG | SYSTOLIC BLOOD PRESSURE: 142 MMHG

## 2020-10-15 VITALS — DIASTOLIC BLOOD PRESSURE: 88 MMHG | SYSTOLIC BLOOD PRESSURE: 147 MMHG

## 2020-10-15 VITALS — SYSTOLIC BLOOD PRESSURE: 118 MMHG | DIASTOLIC BLOOD PRESSURE: 70 MMHG

## 2020-10-15 VITALS — DIASTOLIC BLOOD PRESSURE: 65 MMHG | SYSTOLIC BLOOD PRESSURE: 120 MMHG

## 2020-10-15 VITALS — SYSTOLIC BLOOD PRESSURE: 120 MMHG | DIASTOLIC BLOOD PRESSURE: 74 MMHG

## 2020-10-15 LAB
ALBUMIN SERPL-MCNC: 2 G/DL (ref 3.4–5)
ALBUMIN/GLOB SERPL: 0.6 {RATIO} (ref 1–1.7)
ALP SERPL-CCNC: 243 U/L (ref 46–116)
ALT SERPL-CCNC: 662 U/L (ref 14–59)
ANION GAP SERPL CALC-SCNC: 5 MMOL/L (ref 6–14)
AST SERPL-CCNC: 786 U/L (ref 15–37)
BASE EXCESS ABG: 2 MMOL/L (ref -3–3)
BILIRUB SERPL-MCNC: 0.5 MG/DL (ref 0.2–1)
BUN SERPL-MCNC: 22 MG/DL (ref 7–20)
BUN/CREAT SERPL: 28 (ref 6–20)
CALCIUM SERPL-MCNC: 7.6 MG/DL (ref 8.5–10.1)
CHLORIDE SERPL-SCNC: 104 MMOL/L (ref 98–107)
CO2 SERPL-SCNC: 29 MMOL/L (ref 21–32)
CREAT SERPL-MCNC: 0.8 MG/DL (ref 0.6–1)
CRP SERPL-MCNC: 19.3 MG/L (ref 0–3.3)
ERYTHROCYTE [DISTWIDTH] IN BLOOD BY AUTOMATED COUNT: 14.1 % (ref 11.5–14.5)
GFR SERPLBLD BASED ON 1.73 SQ M-ARVRAT: 77.9 ML/MIN
GLUCOSE SERPL-MCNC: 336 MG/DL (ref 70–99)
HCO3 BLDA-SCNC: 26 MMOL/L (ref 21–28)
HCT VFR BLD CALC: 37.2 % (ref 36–47)
HGB BLD-MCNC: 12.1 G/DL (ref 12–15.5)
INSPIRATION SETTING TIME VENT: 100
MCH RBC QN AUTO: 29 PG (ref 25–35)
MCHC RBC AUTO-ENTMCNC: 33 G/DL (ref 31–37)
MCV RBC AUTO: 90 FL (ref 79–100)
PCO2 BLDA: 39 MMHG (ref 35–46)
PLATELET # BLD AUTO: 240 X10^3/UL (ref 140–400)
PO2 BLDA: 104 MMHG (ref 75–108)
POTASSIUM SERPL-SCNC: 4.5 MMOL/L (ref 3.5–5.1)
PROT SERPL-MCNC: 5.6 G/DL (ref 6.4–8.2)
RBC # BLD AUTO: 4.13 X10^6/UL (ref 3.5–5.4)
SAO2 % BLDA: 98 % (ref 92–99)
SODIUM SERPL-SCNC: 138 MMOL/L (ref 136–145)
WBC # BLD AUTO: 5.9 X10^3/UL (ref 4–11)

## 2020-10-15 RX ADMIN — METHYLPREDNISOLONE SODIUM SUCCINATE SCH MG: 40 INJECTION, POWDER, FOR SOLUTION INTRAMUSCULAR; INTRAVENOUS at 21:23

## 2020-10-15 RX ADMIN — LISINOPRIL SCH MG: 5 TABLET ORAL at 08:27

## 2020-10-15 RX ADMIN — ACETAMINOPHEN PRN MG: 650 SOLUTION ORAL at 21:36

## 2020-10-15 RX ADMIN — VERAPAMIL HYDROCHLORIDE SCH MG: 120 TABLET, FILM COATED, EXTENDED RELEASE ORAL at 19:18

## 2020-10-15 RX ADMIN — INSULIN LISPRO SCH UNITS: 100 INJECTION, SOLUTION INTRAVENOUS; SUBCUTANEOUS at 17:05

## 2020-10-15 RX ADMIN — VERAPAMIL HYDROCHLORIDE SCH MG: 120 TABLET, FILM COATED, EXTENDED RELEASE ORAL at 09:00

## 2020-10-15 RX ADMIN — METHYLPREDNISOLONE SODIUM SUCCINATE SCH MG: 40 INJECTION, POWDER, FOR SOLUTION INTRAMUSCULAR; INTRAVENOUS at 05:12

## 2020-10-15 RX ADMIN — INSULIN LISPRO SCH UNITS: 100 INJECTION, SOLUTION INTRAVENOUS; SUBCUTANEOUS at 12:37

## 2020-10-15 RX ADMIN — INSULIN LISPRO SCH UNITS: 100 INJECTION, SOLUTION INTRAVENOUS; SUBCUTANEOUS at 05:11

## 2020-10-15 RX ADMIN — INSULIN LISPRO SCH UNITS: 100 INJECTION, SOLUTION INTRAVENOUS; SUBCUTANEOUS at 00:42

## 2020-10-15 RX ADMIN — MIDAZOLAM PRN MLS/HR: 5 INJECTION, SOLUTION INTRAMUSCULAR; INTRAVENOUS at 21:22

## 2020-10-15 RX ADMIN — PANTOPRAZOLE SODIUM SCH MG: 40 INJECTION, POWDER, FOR SOLUTION INTRAVENOUS at 08:26

## 2020-10-15 RX ADMIN — LINAGLIPTIN SCH MG: 5 TABLET, FILM COATED ORAL at 08:27

## 2020-10-15 RX ADMIN — ENOXAPARIN SODIUM SCH MG: 40 INJECTION SUBCUTANEOUS at 21:22

## 2020-10-15 RX ADMIN — ENOXAPARIN SODIUM SCH MG: 40 INJECTION SUBCUTANEOUS at 08:27

## 2020-10-15 RX ADMIN — Medication SCH MLS/HR: at 12:42

## 2020-10-15 RX ADMIN — INSULIN LISPRO SCH UNITS: 100 INJECTION, SOLUTION INTRAVENOUS; SUBCUTANEOUS at 17:04

## 2020-10-15 RX ADMIN — CEFTRIAXONE SCH GM: 1 INJECTION, POWDER, FOR SOLUTION INTRAMUSCULAR; INTRAVENOUS at 12:42

## 2020-10-15 RX ADMIN — INSULIN GLARGINE SCH UNIT: 100 INJECTION, SOLUTION SUBCUTANEOUS at 09:00

## 2020-10-15 RX ADMIN — METHYLPREDNISOLONE SODIUM SUCCINATE SCH MG: 40 INJECTION, POWDER, FOR SOLUTION INTRAMUSCULAR; INTRAVENOUS at 12:45

## 2020-10-15 RX ADMIN — MIDAZOLAM PRN MLS/HR: 5 INJECTION, SOLUTION INTRAMUSCULAR; INTRAVENOUS at 09:25

## 2020-10-15 RX ADMIN — INSULIN LISPRO SCH UNITS: 100 INJECTION, SOLUTION INTRAVENOUS; SUBCUTANEOUS at 05:12

## 2020-10-15 RX ADMIN — INSULIN GLARGINE SCH UNIT: 100 INJECTION, SOLUTION SUBCUTANEOUS at 21:24

## 2020-10-15 RX ADMIN — VECURONIUM BROMIDE PRN MG: 1 INJECTION, POWDER, LYOPHILIZED, FOR SOLUTION INTRAVENOUS at 16:41

## 2020-10-15 NOTE — PDOC
PULMONARY PROGRESS NOTES


DATE: 10/15/20 


TIME: 10:47


Subjective


Patient was intubated on 10/12/20 for increased resp. distress 


remains intubated/ sedated 


No overnight concerns from nursing


Vitals





Vital Signs








  Date Time  Temp Pulse Resp B/P (MAP) Pulse Ox O2 Delivery O2 Flow Rate FiO2


 


10/15/20 10:00  95 20 94/79 (84) 93 Ventilator  


 


10/15/20 09:55       15.0 


 


10/15/20 08:00 98.2       





 98.2       








Comments


Pt. is seen during covid 19 pandemic, visual exam preformed 


RRR


intubated 


no edema or rash noted


Labs





Laboratory Tests








Test


 10/13/20


11:30 10/13/20


18:01 10/14/20


00:01 10/14/20


04:55


 


Glucose (Fingerstick)


 300 mg/dL


(70-99) 283 mg/dL


(70-99) 307 mg/dL


(70-99) 





 


White Blood Count


 


 


 


 4.4 x10^3/uL


(4.0-11.0)


 


Red Blood Count


 


 


 


 3.97 x10^6/uL


(3.50-5.40)


 


Hemoglobin


 


 


 


 11.8 g/dL


(12.0-15.5)


 


Hematocrit


 


 


 


 35.5 %


(36.0-47.0)


 


Mean Corpuscular Volume    89 fL () 


 


Mean Corpuscular Hemoglobin    30 pg (25-35) 


 


Mean Corpuscular Hemoglobin


Concent 


 


 


 33 g/dL


(31-37)


 


Red Cell Distribution Width


 


 


 


 14.1 %


(11.5-14.5)


 


Platelet Count


 


 


 


 211 x10^3/uL


(140-400)


 


Sodium Level


 


 


 


 139 mmol/L


(136-145)


 


Potassium Level


 


 


 


 4.6 mmol/L


(3.5-5.1)


 


Chloride Level


 


 


 


 105 mmol/L


()


 


Carbon Dioxide Level


 


 


 


 30 mmol/L


(21-32)


 


Anion Gap    4 (6-14) 


 


Blood Urea Nitrogen


 


 


 


 21 mg/dL


(7-20)


 


Creatinine


 


 


 


 0.7 mg/dL


(0.6-1.0)


 


Estimated GFR


(Cockcroft-Gault) 


 


 


 90.9 





 


BUN/Creatinine Ratio    30 (6-20) 


 


Glucose Level


 


 


 


 326 mg/dL


(70-99)


 


Calcium Level


 


 


 


 7.7 mg/dL


(8.5-10.1)


 


Total Bilirubin


 


 


 


 0.3 mg/dL


(0.2-1.0)


 


Aspartate Amino Transf


(AST/SGOT) 


 


 


 849 U/L


(15-37)


 


Alanine Aminotransferase


(ALT/SGPT) 


 


 


 391 U/L


(14-59)


 


Alkaline Phosphatase


 


 


 


 221 U/L


()


 


Total Protein


 


 


 


 5.1 g/dL


(6.4-8.2)


 


Albumin


 


 


 


 2.0 g/dL


(3.4-5.0)


 


Albumin/Globulin Ratio    0.6 (1.0-1.7) 


 


Test


 10/14/20


04:57 10/14/20


08:00 10/14/20


11:43 10/14/20


18:13


 


Glucose (Fingerstick)


 314 mg/dL


(70-99) 


 320 mg/dL


(70-99) 303 mg/dL


(70-99)


 


O2 Saturation  93 % (92-99)   


 


Arterial Blood pH


 


 7.40


(7.35-7.45) 


 





 


Arterial Blood pCO2 at


Patient Temp 


 39 mmHg


(35-46) 


 





 


Arterial Blood pO2 at Patient


Temp 


 70 mmHg


() 


 





 


Arterial Blood HCO3


 


 23 mmol/L


(21-28) 


 





 


Arterial Blood Base Excess


 


 -1 mmol/L


(-3-3) 


 





 


FiO2  100/vent   


 


Test


 10/15/20


00:32 10/15/20


05:25 10/15/20


09:00 





 


Glucose (Fingerstick)


 318 mg/dL


(70-99) 


 


 





 


White Blood Count


 


 5.9 x10^3/uL


(4.0-11.0) 


 





 


Red Blood Count


 


 4.13 x10^6/uL


(3.50-5.40) 


 





 


Hemoglobin


 


 12.1 g/dL


(12.0-15.5) 


 





 


Hematocrit


 


 37.2 %


(36.0-47.0) 


 





 


Mean Corpuscular Volume  90 fL ()   


 


Mean Corpuscular Hemoglobin  29 pg (25-35)   


 


Mean Corpuscular Hemoglobin


Concent 


 33 g/dL


(31-37) 


 





 


Red Cell Distribution Width


 


 14.1 %


(11.5-14.5) 


 





 


Platelet Count


 


 240 x10^3/uL


(140-400) 


 





 


D-Dimer (Jada)


 


 1.43 ug/mlFEU


(0.00-0.50) 


 





 


Sodium Level


 


 138 mmol/L


(136-145) 


 





 


Potassium Level


 


 4.5 mmol/L


(3.5-5.1) 


 





 


Chloride Level


 


 104 mmol/L


() 


 





 


Carbon Dioxide Level


 


 29 mmol/L


(21-32) 


 





 


Anion Gap  5 (6-14)   


 


Blood Urea Nitrogen


 


 22 mg/dL


(7-20) 


 





 


Creatinine


 


 0.8 mg/dL


(0.6-1.0) 


 





 


Estimated GFR


(Cockcroft-Gault) 


 77.9 


 


 





 


BUN/Creatinine Ratio  28 (6-20)   


 


Glucose Level


 


 336 mg/dL


(70-99) 


 





 


Calcium Level


 


 7.6 mg/dL


(8.5-10.1) 


 





 


Total Bilirubin


 


 0.5 mg/dL


(0.2-1.0) 


 





 


Aspartate Amino Transf


(AST/SGOT) 


 786 U/L


(15-37) 


 





 


Alanine Aminotransferase


(ALT/SGPT) 


 662 U/L


(14-59) 


 





 


Alkaline Phosphatase


 


 243 U/L


() 


 





 


C-Reactive Protein,


Quantitative 


 19.3 mg/L


(0-3.3) 


 





 


Total Protein


 


 5.6 g/dL


(6.4-8.2) 


 





 


Albumin


 


 2.0 g/dL


(3.4-5.0) 


 





 


Albumin/Globulin Ratio  0.6 (1.0-1.7)   


 


O2 Saturation   98 % (92-99)  


 


Arterial Blood pH


 


 


 7.44


(7.35-7.45) 





 


Arterial Blood pCO2 at


Patient Temp 


 


 39 mmHg


(35-46) 





 


Arterial Blood pO2 at Patient


Temp 


 


 104 mmHg


() 





 


Arterial Blood HCO3


 


 


 26 mmol/L


(21-28) 





 


Arterial Blood Base Excess


 


 


 2 mmol/L


(-3-3) 





 


FiO2   100  








Laboratory Tests








Test


 10/14/20


11:43 10/14/20


18:13 10/15/20


00:32 10/15/20


05:25


 


Glucose (Fingerstick)


 320 mg/dL


(70-99) 303 mg/dL


(70-99) 318 mg/dL


(70-99) 





 


White Blood Count


 


 


 


 5.9 x10^3/uL


(4.0-11.0)


 


Red Blood Count


 


 


 


 4.13 x10^6/uL


(3.50-5.40)


 


Hemoglobin


 


 


 


 12.1 g/dL


(12.0-15.5)


 


Hematocrit


 


 


 


 37.2 %


(36.0-47.0)


 


Mean Corpuscular Volume    90 fL () 


 


Mean Corpuscular Hemoglobin    29 pg (25-35) 


 


Mean Corpuscular Hemoglobin


Concent 


 


 


 33 g/dL


(31-37)


 


Red Cell Distribution Width


 


 


 


 14.1 %


(11.5-14.5)


 


Platelet Count


 


 


 


 240 x10^3/uL


(140-400)


 


D-Dimer (Jada)


 


 


 


 1.43 ug/mlFEU


(0.00-0.50)


 


Sodium Level


 


 


 


 138 mmol/L


(136-145)


 


Potassium Level


 


 


 


 4.5 mmol/L


(3.5-5.1)


 


Chloride Level


 


 


 


 104 mmol/L


()


 


Carbon Dioxide Level


 


 


 


 29 mmol/L


(21-32)


 


Anion Gap    5 (6-14) 


 


Blood Urea Nitrogen


 


 


 


 22 mg/dL


(7-20)


 


Creatinine


 


 


 


 0.8 mg/dL


(0.6-1.0)


 


Estimated GFR


(Cockcroft-Gault) 


 


 


 77.9 





 


BUN/Creatinine Ratio    28 (6-20) 


 


Glucose Level


 


 


 


 336 mg/dL


(70-99)


 


Calcium Level


 


 


 


 7.6 mg/dL


(8.5-10.1)


 


Total Bilirubin


 


 


 


 0.5 mg/dL


(0.2-1.0)


 


Aspartate Amino Transf


(AST/SGOT) 


 


 


 786 U/L


(15-37)


 


Alanine Aminotransferase


(ALT/SGPT) 


 


 


 662 U/L


(14-59)


 


Alkaline Phosphatase


 


 


 


 243 U/L


()


 


C-Reactive Protein,


Quantitative 


 


 


 19.3 mg/L


(0-3.3)


 


Total Protein


 


 


 


 5.6 g/dL


(6.4-8.2)


 


Albumin


 


 


 


 2.0 g/dL


(3.4-5.0)


 


Albumin/Globulin Ratio    0.6 (1.0-1.7) 


 


Test


 10/15/20


09:00 


 


 





 


O2 Saturation 98 % (92-99)    


 


Arterial Blood pH


 7.44


(7.35-7.45) 


 


 





 


Arterial Blood pCO2 at


Patient Temp 39 mmHg


(35-46) 


 


 





 


Arterial Blood pO2 at Patient


Temp 104 mmHg


() 


 


 





 


Arterial Blood HCO3


 26 mmol/L


(21-28) 


 


 





 


Arterial Blood Base Excess


 2 mmol/L


(-3-3) 


 


 





 


FiO2 100    








Medications





Active Scripts








 Medications  Dose


 Route/Sig


 Max Daily Dose Days Date Category


 


 Cymbalta


  (Duloxetine Hcl)


 20 Mg Capsule.dr  20 Mg


 PO DAILY


   9/22/20 Reported


 


 Lisinopril 5 Mg


 Tablet  5 Mg


 PO DAILY


   9/22/20 Reported


 


 Amitriptyline Hcl


 10 Mg Tablet  10 Mg


 PO DAILY


   9/22/20 Reported


 


 Trulicity


  (Dulaglutide) 1.5


 Mg/0.5 Ml


 Pen.injctr  1.5 Mg


 SQ


   9/22/20 Reported


 


 Nesina


  (Alogliptin


 Benzoate) 12.5 Mg


 Tablet  12.5 Mg


 PO


   9/22/20 Reported


 


 Propranolol Hcl


 80 Mg Tablet  80 Mg


 PO DAILY


   5/14/14 Reported


 


 Cymbalta


  (Duloxetine Hcl)


 60 Mg Capsule.dr  60 Mg


 PO DAILY


   5/14/14 Reported








Comments


CXR 10/14


 


1. Unchanged bilateral pulmonary opacities.


2. Unchanged position of right subclavian central venous catheter with tip


crossing midline and terminating in the expected region of the left 


brachiocephalic vein.


 


Electronically signed by: Maria Luisa Richter MD (10/14/2020 7:12 AM) [id number]











Impression


.





IMPRESSION:


1.  Acute hypoxemic respiratory failure.-- S/P intubation on 10/12/20 


2.  COVID-19 viral pneumonia.


3.  Abnormal chest x-ray, possible gram-positive, gram-negative pneumonia.


4.  Headaches.


5.  Type 2 diabetes.


6.  Fibromyalgia.


7.  Morbid obesity.


8.  Nonalcoholic steatohepatitis.


9.  Obstructive sleep apnea.


10. Abnormal D-Dimer due to COVID, improving 10/15





Plan


.


Continue current vent support. wean Fi02 at 90% and peep of 10, ABG reviewed .


S/P plasma, cont. full course of remdesivir 


Cont. steroids 


ABX per ID 


Follow clinical course 


DVT/GI PPX; protonix and BID lovenox 





D/W RN and RT 





Critical care time from 8.30-9.00- am











STEPH ROWE MD                 Oct 15, 2020 10:51

## 2020-10-15 NOTE — PDOC
Infectious Disease Note


Subjective


Subjective


intubated on vent





ROS


ROS


Unable to do





Vital Sign


Vital Signs





Vital Signs








  Date Time  Temp Pulse Resp B/P (MAP) Pulse Ox O2 Delivery O2 Flow Rate FiO2


 


10/15/20 06:00  83 20 150/86 (107) 100 Ventilator  


 


10/15/20 04:00 99.3       





 99.3       


 


10/14/20 12:02       15.0 











Physical Exam


PHYSICAL EXAM


GENERAL:  intubated on vent


HEENT:  Normocephalic, atraumatic.  Anicteric.


NECK:  Supple.  No thyromegaly.


LUNGS:  Coarse breath sounds.  No wheezing.


HEART:  S1, S2, no murmurs.


ABDOMEN:  Obese, soft, nontender, bowel sounds present.


EXTREMITIES:  No edema, no cyanosis.


DERMATOLOGIC:  Warm, dry.  No generalized rash.  Multiple tattoos.


CENTRAL NERVOUS SYSTEM:  sedated intubated





Labs


Lab





Laboratory Tests








Test


 10/14/20


08:00 10/14/20


11:43 10/14/20


18:13 10/15/20


00:32


 


O2 Saturation 93 % (92-99)    


 


Arterial Blood pH


 7.40


(7.35-7.45) 


 


 





 


Arterial Blood pCO2 at


Patient Temp 39 mmHg


(35-46) 


 


 





 


Arterial Blood pO2 at Patient


Temp 70 mmHg


() 


 


 





 


Arterial Blood HCO3


 23 mmol/L


(21-28) 


 


 





 


Arterial Blood Base Excess


 -1 mmol/L


(-3-3) 


 


 





 


FiO2 100/vent    


 


Glucose (Fingerstick)


 


 320 mg/dL


(70-99) 303 mg/dL


(70-99) 318 mg/dL


(70-99)


 


Test


 10/15/20


05:25 


 


 





 


White Blood Count


 5.9 x10^3/uL


(4.0-11.0) 


 


 





 


Red Blood Count


 4.13 x10^6/uL


(3.50-5.40) 


 


 





 


Hemoglobin


 12.1 g/dL


(12.0-15.5) 


 


 





 


Hematocrit


 37.2 %


(36.0-47.0) 


 


 





 


Mean Corpuscular Volume 90 fL ()    


 


Mean Corpuscular Hemoglobin 29 pg (25-35)    


 


Mean Corpuscular Hemoglobin


Concent 33 g/dL


(31-37) 


 


 





 


Red Cell Distribution Width


 14.1 %


(11.5-14.5) 


 


 





 


Platelet Count


 240 x10^3/uL


(140-400) 


 


 





 


D-Dimer (Jada)


 1.43 ug/mlFEU


(0.00-0.50) 


 


 





 


Sodium Level


 138 mmol/L


(136-145) 


 


 





 


Potassium Level


 4.5 mmol/L


(3.5-5.1) 


 


 





 


Chloride Level


 104 mmol/L


() 


 


 





 


Carbon Dioxide Level


 29 mmol/L


(21-32) 


 


 





 


Anion Gap 5 (6-14)    


 


Blood Urea Nitrogen


 22 mg/dL


(7-20) 


 


 





 


Creatinine


 0.8 mg/dL


(0.6-1.0) 


 


 





 


Estimated GFR


(Cockcroft-Gault) 77.9 


 


 


 





 


BUN/Creatinine Ratio 28 (6-20)    


 


Glucose Level


 336 mg/dL


(70-99) 


 


 





 


Calcium Level


 7.6 mg/dL


(8.5-10.1) 


 


 





 


Total Bilirubin


 0.5 mg/dL


(0.2-1.0) 


 


 





 


Aspartate Amino Transf


(AST/SGOT) 786 U/L


(15-37) 


 


 





 


Alanine Aminotransferase


(ALT/SGPT) 662 U/L


(14-59) 


 


 





 


Alkaline Phosphatase


 243 U/L


() 


 


 





 


C-Reactive Protein,


Quantitative 19.3 mg/L


(0-3.3) 


 


 





 


Total Protein


 5.6 g/dL


(6.4-8.2) 


 


 





 


Albumin


 2.0 g/dL


(3.4-5.0) 


 


 





 


Albumin/Globulin Ratio 0.6 (1.0-1.7)    











Objective


Assessment


1.  Acute hypoxic respiratory failure.


2.  COVID-19 Pneumonia


3.  Migraine headaches.


4.  Diabetes.


5.  Obesity.


6.  Fibromyalgia.


7.  Abnormal LFTs.





Plan


Plan of Care


cont steroids


cont Remdesivir


supportive care











HEDY BRAND MD               Oct 15, 2020 07:38

## 2020-10-15 NOTE — PN
DATE:  10/15/2020



SUBJECTIVE:  The patient has continued to be heavily sedated, intubated,

mechanically ventilated; however, she is maintaining her oxygen saturation at

99% on FiO2 of 100%.  She continued to be obviously on sedation with midazolam,

propofol and fentanyl as well as the chromium bromide.



PHYSICAL EXAMINATION:

GENERAL:  When I examined her this morning, she looked well and was clearly in

no apparent respiratory distress.  No pallor, jaundice, cyanosis or thyromegaly.

 No jugular venous distention.  No lower limb edema.

VITAL SIGNS:  Her heart rate was 91, blood pressure 150/91, temperature was

99.3, respiratory rate was 20, and oxygen saturation was 100% on FiO2 of 100%.

HEAD, EYES, EARS, NOSE, AND THROAT:  Normocephalic, atraumatic.

NECK:  Supple.

HEART:  Showed normal first and second heart sounds.  No gallop, rub or murmur.

CHEST:  Clear to auscultation.  No crepitation or rhonchi.

ABDOMEN:  Distended, soft, nontender.

NEUROLOGIC:  She is heavily sedated.



Her intake is 3678, output was 2100.



LABORATORY DATA:  As of this morning, her white cell count was 5900, hemoglobin

12, hematocrit 37, MCV 90 and platelet count 240,000.  Her serum sodium was 138,

potassium 4.5, chloride 104, bicarbonate 29, anion gap of 5, BUN 22, creatinine

0.8, estimated GFR was 78 mL per minute.  Her glucose was 336, calcium was 7.6. 

Total bilirubin is normal.  AST, ALT are elevated and rising.  Her total protein

was 5.1, albumin was 2.  Her D-dimer is coming down to 1.43.  Her blood gases as

of yesterday showed a pH of 7.4, pCO2 of 39, pO2 of 70 and oxygen saturation was

93% on FiO2 of 100%.  Today's blood gases still pending at the time of this

dictation.  Her chest x-ray showed unchanged bilateral pulmonary opacities,

unchanged position of the right subclavian central venous catheter with tip

crossing the midline terminating in the expected region of the left

brachiocephalic vein.



ASSESSMENT:

1.  COVID-19 pneumonia.

2.  Acute hypoxic respiratory failure requiring intubation and mechanical

ventilation.  The patient is maintaining her oxygen saturation at 99% on FiO2 of

100%.

3.  Other medical problems include:

A.  Migraine headache.

D.  Type 2 diabetes mellitus, suboptimally controlled.

C.  Fibromyalgia.

D.  Hypertension.

G.  Nephrolithiasis.

F.  Nonalcoholic steatohepatitis with worsening liver enzymes.

G.  Obstructive sleep apnea.



PLAN:  To continue with IV antibiotic.  Continue with IV Solu-Medrol.  The

patient was treated already with remdesivir and convalescent plasma.  We will

continue with DVT and GI prophylaxis.  Continue to monitor blood sugar.  I would

make adjustment to her insulin to have a better control of her blood sugar.

 



______________________________

JULIO CISSE MD



DR:  GOKUL/glo  JOB#:  [id number] / [id number]

DD:  10/15/2020 09:03  DT:  10/15/2020 09:57

## 2020-10-15 NOTE — NUR
SS following up with discharge planning. SS reviewed pt chart and discussed with pt RN. Pt 
remains on the vent at this time. COVID19 positive. Pt on IV Rocephin. SS will continue to 
follow for discharge planning.

## 2020-10-15 NOTE — RAD
Examination: PORTABLE CHEST 1V

 

History: Reason: vent, hypoxia / Spl. Instructions:  / History: 

 

Comparison/Correlation: 10/14/2020

 

Findings: Portable supine frontal view chest was obtained. Enteric tube is

in place. Heart size is within normal limits. Left basilar consolidation 

again seen. No pneumothorax but the patient is supine limiting assessment.

Right subclavian catheter again seen. Pulmonary vasculature congestion 

noted.  Small pleural effusions.

 

 

Impression:

Unremarkable suture congestion. Left basilar consolidation. No significant

improvement in the interval.

 

Electronically signed by: Emir Linares MD (10/15/2020 5:12 PM) [facility]

## 2020-10-16 VITALS — DIASTOLIC BLOOD PRESSURE: 70 MMHG | SYSTOLIC BLOOD PRESSURE: 120 MMHG

## 2020-10-16 VITALS — DIASTOLIC BLOOD PRESSURE: 78 MMHG | SYSTOLIC BLOOD PRESSURE: 144 MMHG

## 2020-10-16 VITALS — DIASTOLIC BLOOD PRESSURE: 67 MMHG | SYSTOLIC BLOOD PRESSURE: 118 MMHG

## 2020-10-16 VITALS — DIASTOLIC BLOOD PRESSURE: 64 MMHG | SYSTOLIC BLOOD PRESSURE: 114 MMHG

## 2020-10-16 VITALS — DIASTOLIC BLOOD PRESSURE: 58 MMHG | SYSTOLIC BLOOD PRESSURE: 115 MMHG

## 2020-10-16 VITALS — DIASTOLIC BLOOD PRESSURE: 61 MMHG | SYSTOLIC BLOOD PRESSURE: 116 MMHG

## 2020-10-16 VITALS — DIASTOLIC BLOOD PRESSURE: 79 MMHG | SYSTOLIC BLOOD PRESSURE: 129 MMHG

## 2020-10-16 VITALS — SYSTOLIC BLOOD PRESSURE: 122 MMHG | DIASTOLIC BLOOD PRESSURE: 68 MMHG

## 2020-10-16 VITALS — SYSTOLIC BLOOD PRESSURE: 123 MMHG | DIASTOLIC BLOOD PRESSURE: 64 MMHG

## 2020-10-16 VITALS — DIASTOLIC BLOOD PRESSURE: 66 MMHG | SYSTOLIC BLOOD PRESSURE: 115 MMHG

## 2020-10-16 VITALS — DIASTOLIC BLOOD PRESSURE: 80 MMHG | SYSTOLIC BLOOD PRESSURE: 140 MMHG

## 2020-10-16 VITALS — SYSTOLIC BLOOD PRESSURE: 119 MMHG | DIASTOLIC BLOOD PRESSURE: 68 MMHG

## 2020-10-16 VITALS — DIASTOLIC BLOOD PRESSURE: 72 MMHG | SYSTOLIC BLOOD PRESSURE: 134 MMHG

## 2020-10-16 VITALS — DIASTOLIC BLOOD PRESSURE: 64 MMHG | SYSTOLIC BLOOD PRESSURE: 122 MMHG

## 2020-10-16 VITALS — SYSTOLIC BLOOD PRESSURE: 120 MMHG | DIASTOLIC BLOOD PRESSURE: 66 MMHG

## 2020-10-16 VITALS — SYSTOLIC BLOOD PRESSURE: 113 MMHG | DIASTOLIC BLOOD PRESSURE: 60 MMHG

## 2020-10-16 VITALS — SYSTOLIC BLOOD PRESSURE: 128 MMHG | DIASTOLIC BLOOD PRESSURE: 74 MMHG

## 2020-10-16 VITALS — DIASTOLIC BLOOD PRESSURE: 70 MMHG | SYSTOLIC BLOOD PRESSURE: 115 MMHG

## 2020-10-16 VITALS — DIASTOLIC BLOOD PRESSURE: 66 MMHG | SYSTOLIC BLOOD PRESSURE: 116 MMHG

## 2020-10-16 VITALS — SYSTOLIC BLOOD PRESSURE: 138 MMHG | DIASTOLIC BLOOD PRESSURE: 72 MMHG

## 2020-10-16 VITALS — SYSTOLIC BLOOD PRESSURE: 144 MMHG | DIASTOLIC BLOOD PRESSURE: 80 MMHG

## 2020-10-16 VITALS — SYSTOLIC BLOOD PRESSURE: 109 MMHG | DIASTOLIC BLOOD PRESSURE: 63 MMHG

## 2020-10-16 LAB
ALBUMIN SERPL-MCNC: 2 G/DL (ref 3.4–5)
ALBUMIN/GLOB SERPL: 0.7 {RATIO} (ref 1–1.7)
ALP SERPL-CCNC: 194 U/L (ref 46–116)
ALT SERPL-CCNC: 448 U/L (ref 14–59)
ANION GAP SERPL CALC-SCNC: 8 MMOL/L (ref 6–14)
AST SERPL-CCNC: 172 U/L (ref 15–37)
BASE EXCESS ABG: 1 MMOL/L (ref -3–3)
BILIRUB SERPL-MCNC: 0.3 MG/DL (ref 0.2–1)
BUN SERPL-MCNC: 23 MG/DL (ref 7–20)
BUN/CREAT SERPL: 26 (ref 6–20)
CALCIUM SERPL-MCNC: 7.3 MG/DL (ref 8.5–10.1)
CHLORIDE SERPL-SCNC: 104 MMOL/L (ref 98–107)
CO2 SERPL-SCNC: 28 MMOL/L (ref 21–32)
CREAT SERPL-MCNC: 0.9 MG/DL (ref 0.6–1)
ERYTHROCYTE [DISTWIDTH] IN BLOOD BY AUTOMATED COUNT: 14.1 % (ref 11.5–14.5)
GFR SERPLBLD BASED ON 1.73 SQ M-ARVRAT: 68 ML/MIN
GLUCOSE SERPL-MCNC: 347 MG/DL (ref 70–99)
HCO3 BLDA-SCNC: 26 MMOL/L (ref 21–28)
HCT VFR BLD CALC: 36.4 % (ref 36–47)
HGB BLD-MCNC: 11.9 G/DL (ref 12–15.5)
MCH RBC QN AUTO: 30 PG (ref 25–35)
MCHC RBC AUTO-ENTMCNC: 33 G/DL (ref 31–37)
MCV RBC AUTO: 91 FL (ref 79–100)
PCO2 BLDA: 45 MMHG (ref 35–46)
PCO2 TEMP ADJ BLD: 50 MMHG
PH TEMP ADJ BLD: 7.35 [PH]
PLATELET # BLD AUTO: 252 X10^3/UL (ref 140–400)
PO2 BLDA: 338 MMHG (ref 75–108)
PO2 TEMP ADJ BLD: 350 MMHG
POTASSIUM SERPL-SCNC: 5.1 MMOL/L (ref 3.5–5.1)
PROT SERPL-MCNC: 4.8 G/DL (ref 6.4–8.2)
RBC # BLD AUTO: 4.01 X10^6/UL (ref 3.5–5.4)
SAO2 % BLDA: 99 % (ref 92–99)
SODIUM SERPL-SCNC: 140 MMOL/L (ref 136–145)
WBC # BLD AUTO: 9.5 X10^3/UL (ref 4–11)

## 2020-10-16 RX ADMIN — VANCOMYCIN HYDROCHLORIDE PRN EACH: 1 INJECTION, POWDER, LYOPHILIZED, FOR SOLUTION INTRAVENOUS at 14:18

## 2020-10-16 RX ADMIN — INSULIN GLARGINE SCH UNIT: 100 INJECTION, SOLUTION SUBCUTANEOUS at 20:45

## 2020-10-16 RX ADMIN — PIPERACILLIN SODIUM AND TAZOBACTAM SODIUM SCH MLS/HR: 3; .375 INJECTION, POWDER, LYOPHILIZED, FOR SOLUTION INTRAVENOUS at 18:26

## 2020-10-16 RX ADMIN — INSULIN LISPRO SCH UNITS: 100 INJECTION, SOLUTION INTRAVENOUS; SUBCUTANEOUS at 00:09

## 2020-10-16 RX ADMIN — PIPERACILLIN SODIUM AND TAZOBACTAM SODIUM SCH MLS/HR: 3; .375 INJECTION, POWDER, LYOPHILIZED, FOR SOLUTION INTRAVENOUS at 12:56

## 2020-10-16 RX ADMIN — METHYLPREDNISOLONE SODIUM SUCCINATE SCH MG: 40 INJECTION, POWDER, FOR SOLUTION INTRAMUSCULAR; INTRAVENOUS at 12:57

## 2020-10-16 RX ADMIN — METHYLPREDNISOLONE SODIUM SUCCINATE SCH MG: 40 INJECTION, POWDER, FOR SOLUTION INTRAMUSCULAR; INTRAVENOUS at 06:24

## 2020-10-16 RX ADMIN — MIDAZOLAM PRN MLS/HR: 5 INJECTION, SOLUTION INTRAMUSCULAR; INTRAVENOUS at 14:06

## 2020-10-16 RX ADMIN — INSULIN LISPRO SCH UNITS: 100 INJECTION, SOLUTION INTRAVENOUS; SUBCUTANEOUS at 06:25

## 2020-10-16 RX ADMIN — INSULIN LISPRO SCH UNITS: 100 INJECTION, SOLUTION INTRAVENOUS; SUBCUTANEOUS at 13:07

## 2020-10-16 RX ADMIN — INSULIN LISPRO SCH UNITS: 100 INJECTION, SOLUTION INTRAVENOUS; SUBCUTANEOUS at 10:17

## 2020-10-16 RX ADMIN — MIDAZOLAM PRN MLS/HR: 5 INJECTION, SOLUTION INTRAMUSCULAR; INTRAVENOUS at 07:26

## 2020-10-16 RX ADMIN — VECURONIUM BROMIDE PRN MG: 1 INJECTION, POWDER, LYOPHILIZED, FOR SOLUTION INTRAVENOUS at 03:20

## 2020-10-16 RX ADMIN — INSULIN LISPRO SCH UNITS: 100 INJECTION, SOLUTION INTRAVENOUS; SUBCUTANEOUS at 06:24

## 2020-10-16 RX ADMIN — ENOXAPARIN SODIUM SCH MG: 40 INJECTION SUBCUTANEOUS at 20:40

## 2020-10-16 RX ADMIN — PANTOPRAZOLE SODIUM SCH MG: 40 INJECTION, POWDER, FOR SOLUTION INTRAVENOUS at 10:17

## 2020-10-16 RX ADMIN — ACETAMINOPHEN PRN MG: 650 SOLUTION ORAL at 04:14

## 2020-10-16 RX ADMIN — CEFTRIAXONE SCH GM: 1 INJECTION, POWDER, FOR SOLUTION INTRAMUSCULAR; INTRAVENOUS at 10:19

## 2020-10-16 RX ADMIN — VANCOMYCIN HYDROCHLORIDE PRN EACH: 1 INJECTION, POWDER, LYOPHILIZED, FOR SOLUTION INTRAVENOUS at 14:06

## 2020-10-16 RX ADMIN — INSULIN LISPRO SCH UNITS: 100 INJECTION, SOLUTION INTRAVENOUS; SUBCUTANEOUS at 17:01

## 2020-10-16 RX ADMIN — LINAGLIPTIN SCH MG: 5 TABLET, FILM COATED ORAL at 10:17

## 2020-10-16 RX ADMIN — VANCOMYCIN HYDROCHLORIDE SCH MLS/HR: 1 INJECTION, POWDER, FOR SOLUTION INTRAVENOUS at 19:14

## 2020-10-16 RX ADMIN — INSULIN LISPRO SCH UNITS: 100 INJECTION, SOLUTION INTRAVENOUS; SUBCUTANEOUS at 13:08

## 2020-10-16 RX ADMIN — INSULIN LISPRO SCH UNITS: 100 INJECTION, SOLUTION INTRAVENOUS; SUBCUTANEOUS at 00:08

## 2020-10-16 RX ADMIN — METHYLPREDNISOLONE SODIUM SUCCINATE SCH MG: 40 INJECTION, POWDER, FOR SOLUTION INTRAMUSCULAR; INTRAVENOUS at 23:56

## 2020-10-16 RX ADMIN — VERAPAMIL HYDROCHLORIDE SCH MG: 120 TABLET, FILM COATED, EXTENDED RELEASE ORAL at 19:14

## 2020-10-16 RX ADMIN — ACETAMINOPHEN PRN MG: 650 SOLUTION ORAL at 21:09

## 2020-10-16 RX ADMIN — ACETAMINOPHEN PRN MG: 650 SOLUTION ORAL at 12:57

## 2020-10-16 RX ADMIN — VERAPAMIL HYDROCHLORIDE SCH MG: 120 TABLET, FILM COATED, EXTENDED RELEASE ORAL at 09:00

## 2020-10-16 RX ADMIN — INSULIN GLARGINE SCH UNIT: 100 INJECTION, SOLUTION SUBCUTANEOUS at 10:18

## 2020-10-16 RX ADMIN — VECURONIUM BROMIDE PRN MG: 1 INJECTION, POWDER, LYOPHILIZED, FOR SOLUTION INTRAVENOUS at 07:10

## 2020-10-16 RX ADMIN — ENOXAPARIN SODIUM SCH MG: 40 INJECTION SUBCUTANEOUS at 10:18

## 2020-10-16 RX ADMIN — LISINOPRIL SCH MG: 5 TABLET ORAL at 10:17

## 2020-10-16 NOTE — NUR
Pharmacy Vancomycin Dosing Note



S: Consulted to monitor and dose vancomycin started 10/16/20.



O: FRANKO ALBRECHT is a 45 year old F with 

Empiric, .



Other Antibiotics: 



LABS:

Last BUN: 23 

Last Creatinine: 0.9 

Creatinine Clearance: 88.45 mL/min

Last WBC: 9.5 

Last Procalcitonin: 

Tmax (past 24 hours): 102.7 



Microbiology:  



I/O: 



Drug Levels:

Last  level:  on  at 

Last dose given 10/16/20 at 0655 



Vancomycin Dosing:

Dosing Weight: 

Target Trough: 15-20





A: Based on: Wt and Crcl





P: 1. Begin Vancomycin 1500 mg IV q12h, received LD of 2000 mg x1 this am

   2. Follow up Trough level on 10/17/20 at 1830 

   3. Pharmacy will continue to monitor, follow and adjust therapy as needed.

 

 PHILLIP YUN [facility], 10/16/20 0928

## 2020-10-16 NOTE — PN
DATE:  10/16/2020



SUBJECTIVE:  The patient is resting, slightly propped up in bed, continued to be

sedated, intubated and mechanically ventilated.  Her FiO2 is down to 80%.  She

unfortunately continued to spike her temperature, was 101.5 last night and this

morning went up to 102.7.  We did order blood cultures and has had a chest

x-ray.  Today's chest x-ray showed improved pulmonary opacities. I did actually

empirically put her back on vancomycin and Zosyn.



PHYSICAL EXAMINATION:

GENERAL:  On examining her this morning, she was resting slightly propped up in

bed, no apparent distress, intubated, mechanically ventilated and sedated.  She

was pale, but no jaundice, cyanosis or thyromegaly.  No jugular venous

distention or limb edema.

VITAL SIGNS:  Her heart rate was 103, blood pressure was 123/64, temperature was

102.7, respiratory rate was 21 and oxygen saturation was 100% on FiO2 of 80%.

HEAD, EYES, EARS, NOSE, AND THROAT:  Showed normocephalic, atraumatic.  She has

orotracheal and orogastric tube in place.

NECK:  Supple.

HEART:  Showed normal first and second heart sounds.  No gallop or murmur.

CHEST:  Showed central trachea, equal bilateral expansion, air entry, vesicular

sounds.  I could not appreciate any crepitation or rhonchi anteriorly.

ABDOMEN:  Distended, soft, nontender.

NEUROLOGIC:  She is heavily sedated.



Her intake over the last 24 hours was 3340, output was 2700.



LABORATORY DATA:  Her lab work this morning showed a white cell count 9500,

hemoglobin 12, hematocrit 36, MCV 91, and platelet count 252,000.  Her chemistry

showed a serum sodium of 140, potassium 5.1, chloride was 104, bicarbonate 28,

anion gap of 8, BUN 23, creatinine 0.9, estimated GFR was 68 mL per minute.  Her

glucose was 147, calcium was 7.3 and serum ferritin was 327.  Total bilirubin is

normal.  AST, ALT, alkaline phosphatase are trending down, although they are

still elevated.  Total protein 4.8, albumin 2.  Her blood gases showed a pH of

7.44, pCO2 of 39, pO2 of 104, bicarbonate 26 and oxygen saturation was 98% on

FiO2 of 100%.  Her D-dimer is trending down to 1.43 and her C-reactive protein

came down from 161-19.3.



ASSESSMENT:

1.  COVID-19 pneumonia.

2.  Acute hypoxic respiratory failure requiring intubation and mechanical

ventilation.  She is now maintaining her oxygen saturation 98% on FiO2 of 80%.

3.  She continued to spike her temperature up to 102.7.  She did stop her

antibiotics yesterday; however, she has a PICC line and arterial line.  I did

start her empirically on IV vancomycin and Zosyn.

4.  Other medical problems include:

A.  Migraine headache.

B.  Type 2 diabetes mellitus.

C.  Fibromyalgia.

D.  Hypertension.

E.  Nephrolithiasis.

F.  Nonalcoholic steatohepatitis.

G.  Obstructive sleep apnea.



PLAN:  To continue with IV antibiotic.  Continue IV Solu-Medrol.  Continue with

remdesivir and convalescent plasma.  She continued to be on DVT and GI

prophylaxis.  We will continue to monitor her blood sugar and adjust insulin as

needed.  Her blood sugar continued to be suboptimally controlled and I will

adjust her insulin.  I will increase her Lantus insulin to 30 units twice a day

and her NovoLog insulin to 15 units every 6 hours.

 



______________________________

JULIO CISSE MD



DR:  GOKUL/glo  JOB#:  [id number] / [id number]

DD:  10/16/2020 08:47  DT:  10/16/2020 09:47

## 2020-10-16 NOTE — PDOC
Infectious Disease Note


Subjective


Subjective


intubated on vent





ROS


ROS


No nausea vomiting diarrhea


Did have fever





Vital Sign


Vital Signs





Vital Signs








  Date Time  Temp Pulse Resp B/P (MAP) Pulse Ox O2 Delivery O2 Flow Rate FiO2


 


10/16/20 07:27     93  15.0 


 


10/16/20 06:00 102.7 103 21 123/64 (83)  Ventilator  





 102.7       











Physical Exam


PHYSICAL EXAM


GENERAL:  intubated on vent


HEENT:  Normocephalic, atraumatic.  Anicteric.


NECK:  Supple.  No thyromegaly.


LUNGS:  Coarse breath sounds.  No wheezing.


HEART:  S1, S2, no murmurs.


ABDOMEN:  Obese, soft, nontender, bowel sounds present.


EXTREMITIES:  No edema, no cyanosis.


DERMATOLOGIC:  Warm, dry.  No generalized rash.  Multiple tattoos.


CENTRAL NERVOUS SYSTEM:  sedated intubated





Labs


Lab





Laboratory Tests








Test


 10/15/20


09:00 10/15/20


16:52 10/15/20


23:56 10/16/20


05:57


 


O2 Saturation 98 % (92-99)    


 


Arterial Blood pH


 7.44


(7.35-7.45) 


 


 





 


Arterial Blood pCO2 at


Patient Temp 39 mmHg


(35-46) 


 


 





 


Arterial Blood pO2 at Patient


Temp 104 mmHg


() 


 


 





 


Arterial Blood HCO3


 26 mmol/L


(21-28) 


 


 





 


Arterial Blood Base Excess


 2 mmol/L


(-3-3) 


 


 





 


FiO2 100    


 


Glucose (Fingerstick)


 


 322 mg/dL


(70-99) 319 mg/dL


(70-99) 320 mg/dL


(70-99)


 


Test


 10/16/20


06:00 


 


 





 


Sodium Level


 140 mmol/L


(136-145) 


 


 





 


Potassium Level


 5.1 mmol/L


(3.5-5.1) 


 


 





 


Chloride Level


 104 mmol/L


() 


 


 





 


Carbon Dioxide Level


 28 mmol/L


(21-32) 


 


 





 


Anion Gap 8 (6-14)    


 


Blood Urea Nitrogen


 23 mg/dL


(7-20) 


 


 





 


Creatinine


 0.9 mg/dL


(0.6-1.0) 


 


 





 


Estimated GFR


(Cockcroft-Gault) 68.0 


 


 


 





 


BUN/Creatinine Ratio 26 (6-20)    


 


Glucose Level


 347 mg/dL


(70-99) 


 


 





 


Calcium Level


 7.3 mg/dL


(8.5-10.1) 


 


 





 


Total Bilirubin


 0.3 mg/dL


(0.2-1.0) 


 


 





 


Aspartate Amino Transf


(AST/SGOT) 172 U/L


(15-37) 


 


 





 


Alanine Aminotransferase


(ALT/SGPT) 448 U/L


(14-59) 


 


 





 


Alkaline Phosphatase


 194 U/L


() 


 


 





 


Total Protein


 4.8 g/dL


(6.4-8.2) 


 


 





 


Albumin


 2.0 g/dL


(3.4-5.0) 


 


 





 


Albumin/Globulin Ratio 0.7 (1.0-1.7)    











Objective


Assessment


1.  Acute hypoxic respiratory failure.


2.  COVID-19 Pneumonia


3.  Migraine headaches.


4.  Diabetes.


5.  Obesity.


6.  Fibromyalgia.


7.  Abnormal LFTs.





Plan


Plan of Care


cont steroids


cont Remdesivir


supportive care


kathie and HEDY Rooney MD               Oct 16, 2020 07:33

## 2020-10-16 NOTE — PDOC
PULMONARY PROGRESS NOTES


DATE: 10/16/20 


TIME: 10:23


Subjective


Patient was intubated on 10/12/20 for increased resp. distress 


remains intubated/ sedated 


fever of 102 overnight 


No overnight concerns from nursing


Vitals





Vital Signs








  Date Time  Temp Pulse Resp B/P (MAP) Pulse Ox O2 Delivery O2 Flow Rate FiO2


 


10/16/20 10:17  88  144/80    


 


10/16/20 07:57     100  15.0 


 


10/16/20 07:50      Ventilator  


 


10/16/20 06:00 102.7  21     





 102.7       








Comments


Pt. is seen during covid 19 pandemic, visual exam preformed 


RRR


out of sync with vent 


intubated 


no edema or rash noted


Labs





Laboratory Tests








Test


 10/14/20


11:43 10/14/20


18:13 10/15/20


00:32 10/15/20


05:25


 


Glucose (Fingerstick)


 320 mg/dL


(70-99) 303 mg/dL


(70-99) 318 mg/dL


(70-99) 





 


White Blood Count


 


 


 


 5.9 x10^3/uL


(4.0-11.0)


 


Red Blood Count


 


 


 


 4.13 x10^6/uL


(3.50-5.40)


 


Hemoglobin


 


 


 


 12.1 g/dL


(12.0-15.5)


 


Hematocrit


 


 


 


 37.2 %


(36.0-47.0)


 


Mean Corpuscular Volume    90 fL () 


 


Mean Corpuscular Hemoglobin    29 pg (25-35) 


 


Mean Corpuscular Hemoglobin


Concent 


 


 


 33 g/dL


(31-37)


 


Red Cell Distribution Width


 


 


 


 14.1 %


(11.5-14.5)


 


Platelet Count


 


 


 


 240 x10^3/uL


(140-400)


 


D-Dimer (Jada)


 


 


 


 1.43 ug/mlFEU


(0.00-0.50)


 


Sodium Level


 


 


 


 138 mmol/L


(136-145)


 


Potassium Level


 


 


 


 4.5 mmol/L


(3.5-5.1)


 


Chloride Level


 


 


 


 104 mmol/L


()


 


Carbon Dioxide Level


 


 


 


 29 mmol/L


(21-32)


 


Anion Gap    5 (6-14) 


 


Blood Urea Nitrogen


 


 


 


 22 mg/dL


(7-20)


 


Creatinine


 


 


 


 0.8 mg/dL


(0.6-1.0)


 


Estimated GFR


(Cockcroft-Gault) 


 


 


 77.9 





 


BUN/Creatinine Ratio    28 (6-20) 


 


Glucose Level


 


 


 


 336 mg/dL


(70-99)


 


Calcium Level


 


 


 


 7.6 mg/dL


(8.5-10.1)


 


Total Bilirubin


 


 


 


 0.5 mg/dL


(0.2-1.0)


 


Aspartate Amino Transf


(AST/SGOT) 


 


 


 786 U/L


(15-37)


 


Alanine Aminotransferase


(ALT/SGPT) 


 


 


 662 U/L


(14-59)


 


Alkaline Phosphatase


 


 


 


 243 U/L


()


 


C-Reactive Protein,


Quantitative 


 


 


 19.3 mg/L


(0-3.3)


 


Total Protein


 


 


 


 5.6 g/dL


(6.4-8.2)


 


Albumin


 


 


 


 2.0 g/dL


(3.4-5.0)


 


Albumin/Globulin Ratio    0.6 (1.0-1.7) 


 


Test


 10/15/20


09:00 10/15/20


16:52 10/15/20


23:56 10/16/20


05:57


 


O2 Saturation 98 % (92-99)    


 


Arterial Blood pH


 7.44


(7.35-7.45) 


 


 





 


Arterial Blood pCO2 at


Patient Temp 39 mmHg


(35-46) 


 


 





 


Arterial Blood pO2 at Patient


Temp 104 mmHg


() 


 


 





 


Arterial Blood HCO3


 26 mmol/L


(21-28) 


 


 





 


Arterial Blood Base Excess


 2 mmol/L


(-3-3) 


 


 





 


FiO2 100    


 


Glucose (Fingerstick)


 


 322 mg/dL


(70-99) 319 mg/dL


(70-99) 320 mg/dL


(70-99)


 


Test


 10/16/20


06:00 10/16/20


07:45 


 





 


White Blood Count


 9.5 x10^3/uL


(4.0-11.0) 


 


 





 


Red Blood Count


 4.01 x10^6/uL


(3.50-5.40) 


 


 





 


Hemoglobin


 11.9 g/dL


(12.0-15.5) 


 


 





 


Hematocrit


 36.4 %


(36.0-47.0) 


 


 





 


Mean Corpuscular Volume 91 fL ()    


 


Mean Corpuscular Hemoglobin 30 pg (25-35)    


 


Mean Corpuscular Hemoglobin


Concent 33 g/dL


(31-37) 


 


 





 


Red Cell Distribution Width


 14.1 %


(11.5-14.5) 


 


 





 


Platelet Count


 252 x10^3/uL


(140-400) 


 


 





 


Sodium Level


 140 mmol/L


(136-145) 


 


 





 


Potassium Level


 5.1 mmol/L


(3.5-5.1) 


 


 





 


Chloride Level


 104 mmol/L


() 


 


 





 


Carbon Dioxide Level


 28 mmol/L


(21-32) 


 


 





 


Anion Gap 8 (6-14)    


 


Blood Urea Nitrogen


 23 mg/dL


(7-20) 


 


 





 


Creatinine


 0.9 mg/dL


(0.6-1.0) 


 


 





 


Estimated GFR


(Cockcroft-Gault) 68.0 


 


 


 





 


BUN/Creatinine Ratio 26 (6-20)    


 


Glucose Level


 347 mg/dL


(70-99) 


 


 





 


Calcium Level


 7.3 mg/dL


(8.5-10.1) 


 


 





 


Ferritin


 527 ng/mL


(8-252) 


 


 





 


Total Bilirubin


 0.3 mg/dL


(0.2-1.0) 


 


 





 


Aspartate Amino Transf


(AST/SGOT) 172 U/L


(15-37) 


 


 





 


Alanine Aminotransferase


(ALT/SGPT) 448 U/L


(14-59) 


 


 





 


Alkaline Phosphatase


 194 U/L


() 


 


 





 


Total Protein


 4.8 g/dL


(6.4-8.2) 


 


 





 


Albumin


 2.0 g/dL


(3.4-5.0) 


 


 





 


Albumin/Globulin Ratio 0.7 (1.0-1.7)    


 


O2 Saturation  99 % (92-99)   


 


Arterial Blood pH


 


 7.38


(7.35-7.45) 


 





 


Arterial Blood pH (Temp


corrected) 


 7.35 


 


 





 


Arterial Blood pCO2 at


Patient Temp 


 45 mmHg


(35-46) 


 





 


Arterial Blood pCO2 (Temp


correct) 


 50 mmHg 


 


 





 


Arterial Blood pO2 at Patient


Temp 


 338 mmHg


() 


 





 


Arterial Blood pO2 (Temp


corrected) 


 350 mmHg 


 


 





 


Arterial Blood HCO3


 


 26 mmol/L


(21-28) 


 





 


Arterial Blood Base Excess


 


 1 mmol/L


(-3-3) 


 











Laboratory Tests








Test


 10/15/20


16:52 10/15/20


23:56 10/16/20


05:57 10/16/20


06:00


 


Glucose (Fingerstick)


 322 mg/dL


(70-99) 319 mg/dL


(70-99) 320 mg/dL


(70-99) 





 


White Blood Count


 


 


 


 9.5 x10^3/uL


(4.0-11.0)


 


Red Blood Count


 


 


 


 4.01 x10^6/uL


(3.50-5.40)


 


Hemoglobin


 


 


 


 11.9 g/dL


(12.0-15.5)


 


Hematocrit


 


 


 


 36.4 %


(36.0-47.0)


 


Mean Corpuscular Volume    91 fL () 


 


Mean Corpuscular Hemoglobin    30 pg (25-35) 


 


Mean Corpuscular Hemoglobin


Concent 


 


 


 33 g/dL


(31-37)


 


Red Cell Distribution Width


 


 


 


 14.1 %


(11.5-14.5)


 


Platelet Count


 


 


 


 252 x10^3/uL


(140-400)


 


Sodium Level


 


 


 


 140 mmol/L


(136-145)


 


Potassium Level


 


 


 


 5.1 mmol/L


(3.5-5.1)


 


Chloride Level


 


 


 


 104 mmol/L


()


 


Carbon Dioxide Level


 


 


 


 28 mmol/L


(21-32)


 


Anion Gap    8 (6-14) 


 


Blood Urea Nitrogen


 


 


 


 23 mg/dL


(7-20)


 


Creatinine


 


 


 


 0.9 mg/dL


(0.6-1.0)


 


Estimated GFR


(Cockcroft-Gault) 


 


 


 68.0 





 


BUN/Creatinine Ratio    26 (6-20) 


 


Glucose Level


 


 


 


 347 mg/dL


(70-99)


 


Calcium Level


 


 


 


 7.3 mg/dL


(8.5-10.1)


 


Ferritin


 


 


 


 527 ng/mL


(8-252)


 


Total Bilirubin


 


 


 


 0.3 mg/dL


(0.2-1.0)


 


Aspartate Amino Transf


(AST/SGOT) 


 


 


 172 U/L


(15-37)


 


Alanine Aminotransferase


(ALT/SGPT) 


 


 


 448 U/L


(14-59)


 


Alkaline Phosphatase


 


 


 


 194 U/L


()


 


Total Protein


 


 


 


 4.8 g/dL


(6.4-8.2)


 


Albumin


 


 


 


 2.0 g/dL


(3.4-5.0)


 


Albumin/Globulin Ratio    0.7 (1.0-1.7) 


 


Test


 10/16/20


07:45 


 


 





 


O2 Saturation 99 % (92-99)    


 


Arterial Blood pH


 7.38


(7.35-7.45) 


 


 





 


Arterial Blood pH (Temp


corrected) 7.35 


 


 


 





 


Arterial Blood pCO2 at


Patient Temp 45 mmHg


(35-46) 


 


 





 


Arterial Blood pCO2 (Temp


correct) 50 mmHg 


 


 


 





 


Arterial Blood pO2 at Patient


Temp 338 mmHg


() 


 


 





 


Arterial Blood pO2 (Temp


corrected) 350 mmHg 


 


 


 





 


Arterial Blood HCO3


 26 mmol/L


(21-28) 


 


 





 


Arterial Blood Base Excess


 1 mmol/L


(-3-3) 


 


 











Medications





Active Scripts








 Medications  Dose


 Route/Sig


 Max Daily Dose Days Date Category


 


 Cymbalta


  (Duloxetine Hcl)


 20 Mg Capsule.dr  20 Mg


 PO DAILY


   9/22/20 Reported


 


 Lisinopril 5 Mg


 Tablet  5 Mg


 PO DAILY


   9/22/20 Reported


 


 Amitriptyline Hcl


 10 Mg Tablet  10 Mg


 PO DAILY


   9/22/20 Reported


 


 Trulicity


  (Dulaglutide) 1.5


 Mg/0.5 Ml


 Pen.injctr  1.5 Mg


 SQ


   9/22/20 Reported


 


 Nesina


  (Alogliptin


 Benzoate) 12.5 Mg


 Tablet  12.5 Mg


 PO


   9/22/20 Reported


 


 Propranolol Hcl


 80 Mg Tablet  80 Mg


 PO DAILY


   5/14/14 Reported


 


 Cymbalta


  (Duloxetine Hcl)


 60 Mg Capsule.dr  60 Mg


 PO DAILY


   5/14/14 Reported








Comments


CXR 10/14


 


1. Unchanged bilateral pulmonary opacities.


2. Unchanged position of right subclavian central venous catheter with tip


crossing midline and terminating in the expected region of the left 


brachiocephalic vein.


 


Electronically signed by: Maria Luisa Richter MD (10/14/2020 7:12 AM) [id number]





CXR 10/16 


IMPRESSION:Improved pulmonary opacities.











Impression


.


IMPRESSION:


1.  Acute hypoxemic respiratory failure.-- S/P intubation on 10/12/20 


2.  COVID-19 viral pneumonia.


3.  Abnormal chest x-ray, possible gram-positive, gram-negative pneumonia.


4.  Headaches.


5.  Type 2 diabetes.


6.  Fibromyalgia.


7.  Morbid obesity.


8.  Nonalcoholic steatohepatitis.


9.  Obstructive sleep apnea.


10. Abnormal D-Dimer due to COVID, improving 10/15





Plan


.


Continue current vent support. wean Fi02 at 90% and peep of 10, ABG reviewed 

.will reduced to Fi02 of 70% and PEEP of 8


CXR shows improved Bilateral infiltrates, will advance ET tube 2cm 


Tylenol for fever and trend 


S/P plasma, cont. full course of remdesivir 


Cont. steroids 


ABX per ID: [email or web address] 


Follow clinical course 


DVT/GI PPX; protonix and BID lovenox 





D/W RN and RT 





Critical care time from 0900-0930AM











STEPH ROWE MD                 Oct 16, 2020 10:26

## 2020-10-16 NOTE — RAD
EXAM: PORTABLE CHEST 1V 10/16/2020 5:00 AM

 

CLINICAL INDICATION:Vent

 

COMPARISON:Chest radiograph 10/14/2020

 

TECHNIQUE:AP semierect view of the chest

 

FINDINGS:The endotracheal tube terminates 6.1 cm above the hiren. NG tube

courses below the diaphragm. A right subclavian catheter is unchanged with

tip crossing midline and extending slightly into the left brachiocephalic 

vein. The cardiomediastinal silhouette is normal. Lungs are adequately 

expanded. Bilateral opacities have improved. There may be some persistent 

focal opacities in the mid right lung. No pneumothorax.

 

IMPRESSION:Improved pulmonary opacities.

 

Electronically signed by: Maria Luisa Richter MD (10/16/2020 7:11 AM) [id number] detailed exam

## 2020-10-16 NOTE — NUR
SS following up with discharge planning. SS reviewed pt chart and discussed with pt RN. Pt 
remains on the vent at this time at 60%. Pt on IV Zosyn, IV Vancomycin, and Remdesivir. 
COVID19 positive. Not stable for discharge at this time. SS will continue to follow for 
discharge planning.

## 2020-10-17 VITALS — SYSTOLIC BLOOD PRESSURE: 138 MMHG | DIASTOLIC BLOOD PRESSURE: 80 MMHG

## 2020-10-17 VITALS — SYSTOLIC BLOOD PRESSURE: 128 MMHG | DIASTOLIC BLOOD PRESSURE: 66 MMHG

## 2020-10-17 VITALS — SYSTOLIC BLOOD PRESSURE: 138 MMHG | DIASTOLIC BLOOD PRESSURE: 90 MMHG

## 2020-10-17 VITALS — SYSTOLIC BLOOD PRESSURE: 135 MMHG | DIASTOLIC BLOOD PRESSURE: 71 MMHG

## 2020-10-17 VITALS — SYSTOLIC BLOOD PRESSURE: 137 MMHG | DIASTOLIC BLOOD PRESSURE: 81 MMHG

## 2020-10-17 VITALS — SYSTOLIC BLOOD PRESSURE: 133 MMHG | DIASTOLIC BLOOD PRESSURE: 70 MMHG

## 2020-10-17 VITALS — DIASTOLIC BLOOD PRESSURE: 80 MMHG | SYSTOLIC BLOOD PRESSURE: 130 MMHG

## 2020-10-17 VITALS — SYSTOLIC BLOOD PRESSURE: 134 MMHG | DIASTOLIC BLOOD PRESSURE: 70 MMHG

## 2020-10-17 VITALS — DIASTOLIC BLOOD PRESSURE: 86 MMHG | SYSTOLIC BLOOD PRESSURE: 152 MMHG

## 2020-10-17 VITALS — DIASTOLIC BLOOD PRESSURE: 81 MMHG | SYSTOLIC BLOOD PRESSURE: 140 MMHG

## 2020-10-17 VITALS — SYSTOLIC BLOOD PRESSURE: 130 MMHG | DIASTOLIC BLOOD PRESSURE: 76 MMHG

## 2020-10-17 VITALS — DIASTOLIC BLOOD PRESSURE: 68 MMHG | SYSTOLIC BLOOD PRESSURE: 134 MMHG

## 2020-10-17 VITALS — SYSTOLIC BLOOD PRESSURE: 135 MMHG | DIASTOLIC BLOOD PRESSURE: 74 MMHG

## 2020-10-17 VITALS — SYSTOLIC BLOOD PRESSURE: 126 MMHG | DIASTOLIC BLOOD PRESSURE: 76 MMHG

## 2020-10-17 VITALS — DIASTOLIC BLOOD PRESSURE: 88 MMHG | SYSTOLIC BLOOD PRESSURE: 138 MMHG

## 2020-10-17 VITALS — DIASTOLIC BLOOD PRESSURE: 62 MMHG | SYSTOLIC BLOOD PRESSURE: 118 MMHG

## 2020-10-17 VITALS — SYSTOLIC BLOOD PRESSURE: 128 MMHG | DIASTOLIC BLOOD PRESSURE: 74 MMHG

## 2020-10-17 VITALS — DIASTOLIC BLOOD PRESSURE: 58 MMHG | SYSTOLIC BLOOD PRESSURE: 102 MMHG

## 2020-10-17 VITALS — DIASTOLIC BLOOD PRESSURE: 70 MMHG | SYSTOLIC BLOOD PRESSURE: 138 MMHG

## 2020-10-17 VITALS — DIASTOLIC BLOOD PRESSURE: 66 MMHG | SYSTOLIC BLOOD PRESSURE: 125 MMHG

## 2020-10-17 VITALS — SYSTOLIC BLOOD PRESSURE: 128 MMHG | DIASTOLIC BLOOD PRESSURE: 65 MMHG

## 2020-10-17 VITALS — SYSTOLIC BLOOD PRESSURE: 136 MMHG | DIASTOLIC BLOOD PRESSURE: 86 MMHG

## 2020-10-17 VITALS — SYSTOLIC BLOOD PRESSURE: 133 MMHG | DIASTOLIC BLOOD PRESSURE: 68 MMHG

## 2020-10-17 VITALS — DIASTOLIC BLOOD PRESSURE: 68 MMHG | SYSTOLIC BLOOD PRESSURE: 133 MMHG

## 2020-10-17 LAB
ALBUMIN SERPL-MCNC: 1.8 G/DL (ref 3.4–5)
ALBUMIN/GLOB SERPL: 0.6 {RATIO} (ref 1–1.7)
ALP SERPL-CCNC: 162 U/L (ref 46–116)
ALT SERPL-CCNC: 301 U/L (ref 14–59)
ANION GAP SERPL CALC-SCNC: 6 MMOL/L (ref 6–14)
AST SERPL-CCNC: 117 U/L (ref 15–37)
BASE EXCESS ABG: 3 MMOL/L (ref -3–3)
BILIRUB SERPL-MCNC: 0.3 MG/DL (ref 0.2–1)
BUN SERPL-MCNC: 22 MG/DL (ref 7–20)
BUN/CREAT SERPL: 31 (ref 6–20)
CALCIUM SERPL-MCNC: 7.6 MG/DL (ref 8.5–10.1)
CHLORIDE SERPL-SCNC: 103 MMOL/L (ref 98–107)
CO2 SERPL-SCNC: 30 MMOL/L (ref 21–32)
CREAT SERPL-MCNC: 0.7 MG/DL (ref 0.6–1)
ERYTHROCYTE [DISTWIDTH] IN BLOOD BY AUTOMATED COUNT: 14.2 % (ref 11.5–14.5)
GFR SERPLBLD BASED ON 1.73 SQ M-ARVRAT: 90.9 ML/MIN
GLUCOSE SERPL-MCNC: 317 MG/DL (ref 70–99)
HCO3 BLDA-SCNC: 27 MMOL/L (ref 21–28)
HCT VFR BLD CALC: 36.4 % (ref 36–47)
HGB BLD-MCNC: 11.7 G/DL (ref 12–15.5)
MCH RBC QN AUTO: 29 PG (ref 25–35)
MCHC RBC AUTO-ENTMCNC: 32 G/DL (ref 31–37)
MCV RBC AUTO: 90 FL (ref 79–100)
PCO2 BLDA: 39 MMHG (ref 35–46)
PLATELET # BLD AUTO: 243 X10^3/UL (ref 140–400)
PO2 BLDA: 78 MMHG (ref 75–108)
POTASSIUM SERPL-SCNC: 5.3 MMOL/L (ref 3.5–5.1)
PROT SERPL-MCNC: 4.7 G/DL (ref 6.4–8.2)
RBC # BLD AUTO: 4.03 X10^6/UL (ref 3.5–5.4)
SAO2 % BLDA: 95 % (ref 92–99)
SODIUM SERPL-SCNC: 139 MMOL/L (ref 136–145)
VANC TR: 6.3 MCG/ML (ref 10–20)
WBC # BLD AUTO: 10.3 X10^3/UL (ref 4–11)

## 2020-10-17 RX ADMIN — INSULIN LISPRO SCH UNITS: 100 INJECTION, SOLUTION INTRAVENOUS; SUBCUTANEOUS at 00:45

## 2020-10-17 RX ADMIN — PIPERACILLIN SODIUM AND TAZOBACTAM SODIUM SCH MLS/HR: 3; .375 INJECTION, POWDER, LYOPHILIZED, FOR SOLUTION INTRAVENOUS at 18:57

## 2020-10-17 RX ADMIN — INSULIN LISPRO SCH UNITS: 100 INJECTION, SOLUTION INTRAVENOUS; SUBCUTANEOUS at 13:41

## 2020-10-17 RX ADMIN — ENOXAPARIN SODIUM SCH MG: 40 INJECTION SUBCUTANEOUS at 21:25

## 2020-10-17 RX ADMIN — INSULIN LISPRO SCH UNITS: 100 INJECTION, SOLUTION INTRAVENOUS; SUBCUTANEOUS at 06:01

## 2020-10-17 RX ADMIN — METHYLPREDNISOLONE SODIUM SUCCINATE SCH MG: 40 INJECTION, POWDER, FOR SOLUTION INTRAMUSCULAR; INTRAVENOUS at 14:07

## 2020-10-17 RX ADMIN — INSULIN LISPRO SCH UNITS: 100 INJECTION, SOLUTION INTRAVENOUS; SUBCUTANEOUS at 19:00

## 2020-10-17 RX ADMIN — METHYLPREDNISOLONE SODIUM SUCCINATE SCH MG: 40 INJECTION, POWDER, FOR SOLUTION INTRAMUSCULAR; INTRAVENOUS at 05:57

## 2020-10-17 RX ADMIN — INSULIN LISPRO SCH UNITS: 100 INJECTION, SOLUTION INTRAVENOUS; SUBCUTANEOUS at 05:59

## 2020-10-17 RX ADMIN — INSULIN GLARGINE SCH UNIT: 100 INJECTION, SOLUTION SUBCUTANEOUS at 09:00

## 2020-10-17 RX ADMIN — VERAPAMIL HYDROCHLORIDE SCH MG: 120 TABLET, FILM COATED, EXTENDED RELEASE ORAL at 21:00

## 2020-10-17 RX ADMIN — VANCOMYCIN HYDROCHLORIDE SCH MLS/HR: 1 INJECTION, POWDER, FOR SOLUTION INTRAVENOUS at 08:35

## 2020-10-17 RX ADMIN — ENOXAPARIN SODIUM SCH MG: 40 INJECTION SUBCUTANEOUS at 08:40

## 2020-10-17 RX ADMIN — INSULIN LISPRO SCH UNITS: 100 INJECTION, SOLUTION INTRAVENOUS; SUBCUTANEOUS at 13:39

## 2020-10-17 RX ADMIN — PIPERACILLIN SODIUM AND TAZOBACTAM SODIUM SCH MLS/HR: 3; .375 INJECTION, POWDER, LYOPHILIZED, FOR SOLUTION INTRAVENOUS at 13:34

## 2020-10-17 RX ADMIN — MIDAZOLAM PRN MLS/HR: 5 INJECTION, SOLUTION INTRAMUSCULAR; INTRAVENOUS at 22:25

## 2020-10-17 RX ADMIN — METHYLPREDNISOLONE SODIUM SUCCINATE SCH MG: 40 INJECTION, POWDER, FOR SOLUTION INTRAMUSCULAR; INTRAVENOUS at 21:25

## 2020-10-17 RX ADMIN — LISINOPRIL SCH MG: 5 TABLET ORAL at 08:42

## 2020-10-17 RX ADMIN — INSULIN LISPRO SCH UNITS: 100 INJECTION, SOLUTION INTRAVENOUS; SUBCUTANEOUS at 00:46

## 2020-10-17 RX ADMIN — VANCOMYCIN HYDROCHLORIDE SCH MLS/HR: 1 INJECTION, POWDER, FOR SOLUTION INTRAVENOUS at 19:00

## 2020-10-17 RX ADMIN — INSULIN LISPRO SCH UNITS: 100 INJECTION, SOLUTION INTRAVENOUS; SUBCUTANEOUS at 08:45

## 2020-10-17 RX ADMIN — ACETAMINOPHEN PRN MG: 650 SOLUTION ORAL at 05:07

## 2020-10-17 RX ADMIN — MIDAZOLAM PRN MLS/HR: 5 INJECTION, SOLUTION INTRAMUSCULAR; INTRAVENOUS at 14:06

## 2020-10-17 RX ADMIN — VANCOMYCIN HYDROCHLORIDE PRN EACH: 1 INJECTION, POWDER, LYOPHILIZED, FOR SOLUTION INTRAVENOUS at 07:40

## 2020-10-17 RX ADMIN — INSULIN GLARGINE SCH UNIT: 100 INJECTION, SOLUTION SUBCUTANEOUS at 21:21

## 2020-10-17 RX ADMIN — VANCOMYCIN HYDROCHLORIDE PRN EACH: 1 INJECTION, POWDER, LYOPHILIZED, FOR SOLUTION INTRAVENOUS at 20:58

## 2020-10-17 RX ADMIN — INSULIN LISPRO SCH UNITS: 100 INJECTION, SOLUTION INTRAVENOUS; SUBCUTANEOUS at 19:01

## 2020-10-17 RX ADMIN — LINAGLIPTIN SCH MG: 5 TABLET, FILM COATED ORAL at 08:42

## 2020-10-17 RX ADMIN — PIPERACILLIN SODIUM AND TAZOBACTAM SODIUM SCH MLS/HR: 3; .375 INJECTION, POWDER, LYOPHILIZED, FOR SOLUTION INTRAVENOUS at 00:45

## 2020-10-17 RX ADMIN — PANTOPRAZOLE SODIUM SCH MG: 40 INJECTION, POWDER, FOR SOLUTION INTRAVENOUS at 08:33

## 2020-10-17 RX ADMIN — VANCOMYCIN HYDROCHLORIDE SCH MLS/HR: 1 INJECTION, POWDER, FOR SOLUTION INTRAVENOUS at 21:06

## 2020-10-17 RX ADMIN — PIPERACILLIN SODIUM AND TAZOBACTAM SODIUM SCH MLS/HR: 3; .375 INJECTION, POWDER, LYOPHILIZED, FOR SOLUTION INTRAVENOUS at 05:58

## 2020-10-17 RX ADMIN — VERAPAMIL HYDROCHLORIDE SCH MG: 120 TABLET, FILM COATED, EXTENDED RELEASE ORAL at 08:41

## 2020-10-17 NOTE — PDOC
PULMONARY PROGRESS NOTES


DATE: 10/17/20 


TIME: 10:43


Subjective


Patient was intubated on 10/12/20 for increased resp. distress, sedated on 

versed, fentanyl.. mod ett secretion peep 8-fi01 60% 


remains intubated/ sedated 


fever of 101.7 overnight 


No overnight concerns from nursing


Vitals





Vital Signs








  Date Time  Temp Pulse Resp B/P (MAP) Pulse Ox O2 Delivery O2 Flow Rate FiO2


 


10/17/20 08:42  85  133/70    


 


10/17/20 07:41     96 Ventilator  


 


10/17/20 06:00 101.7  20     





 101.7       


 


10/16/20 07:57       15.0 








Comments


Pt. is seen during covid 19 pandemic, visual exam preformed 


on vent sedated


NC AT


no accessory muscle use


RRR


no edema or rash noted


Labs





Laboratory Tests








Test


 10/15/20


16:52 10/15/20


23:56 10/16/20


05:57 10/16/20


06:00


 


Glucose (Fingerstick)


 322 mg/dL


(70-99) 319 mg/dL


(70-99) 320 mg/dL


(70-99) 





 


White Blood Count


 


 


 


 9.5 x10^3/uL


(4.0-11.0)


 


Red Blood Count


 


 


 


 4.01 x10^6/uL


(3.50-5.40)


 


Hemoglobin


 


 


 


 11.9 g/dL


(12.0-15.5)


 


Hematocrit


 


 


 


 36.4 %


(36.0-47.0)


 


Mean Corpuscular Volume    91 fL () 


 


Mean Corpuscular Hemoglobin    30 pg (25-35) 


 


Mean Corpuscular Hemoglobin


Concent 


 


 


 33 g/dL


(31-37)


 


Red Cell Distribution Width


 


 


 


 14.1 %


(11.5-14.5)


 


Platelet Count


 


 


 


 252 x10^3/uL


(140-400)


 


Sodium Level


 


 


 


 140 mmol/L


(136-145)


 


Potassium Level


 


 


 


 5.1 mmol/L


(3.5-5.1)


 


Chloride Level


 


 


 


 104 mmol/L


()


 


Carbon Dioxide Level


 


 


 


 28 mmol/L


(21-32)


 


Anion Gap    8 (6-14) 


 


Blood Urea Nitrogen


 


 


 


 23 mg/dL


(7-20)


 


Creatinine


 


 


 


 0.9 mg/dL


(0.6-1.0)


 


Estimated GFR


(Cockcroft-Gault) 


 


 


 68.0 





 


BUN/Creatinine Ratio    26 (6-20) 


 


Glucose Level


 


 


 


 347 mg/dL


(70-99)


 


Calcium Level


 


 


 


 7.3 mg/dL


(8.5-10.1)


 


Ferritin


 


 


 


 527 ng/mL


(8-252)


 


Total Bilirubin


 


 


 


 0.3 mg/dL


(0.2-1.0)


 


Aspartate Amino Transf


(AST/SGOT) 


 


 


 172 U/L


(15-37)


 


Alanine Aminotransferase


(ALT/SGPT) 


 


 


 448 U/L


(14-59)


 


Alkaline Phosphatase


 


 


 


 194 U/L


()


 


Total Protein


 


 


 


 4.8 g/dL


(6.4-8.2)


 


Albumin


 


 


 


 2.0 g/dL


(3.4-5.0)


 


Albumin/Globulin Ratio    0.7 (1.0-1.7) 


 


Test


 10/16/20


07:45 10/16/20


13:03 10/16/20


16:59 10/17/20


00:22


 


O2 Saturation 99 % (92-99)    


 


Arterial Blood pH


 7.38


(7.35-7.45) 


 


 





 


Arterial Blood pH (Temp


corrected) 7.35 


 


 


 





 


Arterial Blood pCO2 at


Patient Temp 45 mmHg


(35-46) 


 


 





 


Arterial Blood pCO2 (Temp


correct) 50 mmHg 


 


 


 





 


Arterial Blood pO2 at Patient


Temp 338 mmHg


() 


 


 





 


Arterial Blood pO2 (Temp


corrected) 350 mmHg 


 


 


 





 


Arterial Blood HCO3


 26 mmol/L


(21-28) 


 


 





 


Arterial Blood Base Excess


 1 mmol/L


(-3-3) 


 


 





 


Glucose (Fingerstick)


 


 327 mg/dL


(70-99) 260 mg/dL


(70-99) 248 mg/dL


(70-99)


 


Test


 10/17/20


05:21 10/17/20


08:20 


 





 


White Blood Count


 10.3 x10^3/uL


(4.0-11.0) 


 


 





 


Red Blood Count


 4.03 x10^6/uL


(3.50-5.40) 


 


 





 


Hemoglobin


 11.7 g/dL


(12.0-15.5) 


 


 





 


Hematocrit


 36.4 %


(36.0-47.0) 


 


 





 


Mean Corpuscular Volume 90 fL ()    


 


Mean Corpuscular Hemoglobin 29 pg (25-35)    


 


Mean Corpuscular Hemoglobin


Concent 32 g/dL


(31-37) 


 


 





 


Red Cell Distribution Width


 14.2 %


(11.5-14.5) 


 


 





 


Platelet Count


 243 x10^3/uL


(140-400) 


 


 





 


Sodium Level


 139 mmol/L


(136-145) 


 


 





 


Potassium Level


 5.3 mmol/L


(3.5-5.1) 


 


 





 


Chloride Level


 103 mmol/L


() 


 


 





 


Carbon Dioxide Level


 30 mmol/L


(21-32) 


 


 





 


Anion Gap 6 (6-14)    


 


Blood Urea Nitrogen


 22 mg/dL


(7-20) 


 


 





 


Creatinine


 0.7 mg/dL


(0.6-1.0) 


 


 





 


Estimated GFR


(Cockcroft-Gault) 90.9 


 


 


 





 


BUN/Creatinine Ratio 31 (6-20)    


 


Glucose Level


 317 mg/dL


(70-99) 


 


 





 


Calcium Level


 7.6 mg/dL


(8.5-10.1) 


 


 





 


Total Bilirubin


 0.3 mg/dL


(0.2-1.0) 


 


 





 


Aspartate Amino Transf


(AST/SGOT) 117 U/L


(15-37) 


 


 





 


Alanine Aminotransferase


(ALT/SGPT) 301 U/L


(14-59) 


 


 





 


Alkaline Phosphatase


 162 U/L


() 


 


 





 


Total Protein


 4.7 g/dL


(6.4-8.2) 


 


 





 


Albumin


 1.8 g/dL


(3.4-5.0) 


 


 





 


Albumin/Globulin Ratio 0.6 (1.0-1.7)    


 


O2 Saturation  95 % (92-99)   


 


Arterial Blood pH


 


 7.46


(7.35-7.45) 


 





 


Arterial Blood pCO2 at


Patient Temp 


 39 mmHg


(35-46) 


 





 


Arterial Blood pO2 at Patient


Temp 


 78 mmHg


() 


 





 


Arterial Blood HCO3


 


 27 mmol/L


(21-28) 


 





 


Arterial Blood Base Excess


 


 3 mmol/L


(-3-3) 


 





 


FiO2  60% vent   








Laboratory Tests








Test


 10/16/20


13:03 10/16/20


16:59 10/17/20


00:22 10/17/20


05:21


 


Glucose (Fingerstick)


 327 mg/dL


(70-99) 260 mg/dL


(70-99) 248 mg/dL


(70-99) 





 


White Blood Count


 


 


 


 10.3 x10^3/uL


(4.0-11.0)


 


Red Blood Count


 


 


 


 4.03 x10^6/uL


(3.50-5.40)


 


Hemoglobin


 


 


 


 11.7 g/dL


(12.0-15.5)


 


Hematocrit


 


 


 


 36.4 %


(36.0-47.0)


 


Mean Corpuscular Volume    90 fL () 


 


Mean Corpuscular Hemoglobin    29 pg (25-35) 


 


Mean Corpuscular Hemoglobin


Concent 


 


 


 32 g/dL


(31-37)


 


Red Cell Distribution Width


 


 


 


 14.2 %


(11.5-14.5)


 


Platelet Count


 


 


 


 243 x10^3/uL


(140-400)


 


Sodium Level


 


 


 


 139 mmol/L


(136-145)


 


Potassium Level


 


 


 


 5.3 mmol/L


(3.5-5.1)


 


Chloride Level


 


 


 


 103 mmol/L


()


 


Carbon Dioxide Level


 


 


 


 30 mmol/L


(21-32)


 


Anion Gap    6 (6-14) 


 


Blood Urea Nitrogen


 


 


 


 22 mg/dL


(7-20)


 


Creatinine


 


 


 


 0.7 mg/dL


(0.6-1.0)


 


Estimated GFR


(Cockcroft-Gault) 


 


 


 90.9 





 


BUN/Creatinine Ratio    31 (6-20) 


 


Glucose Level


 


 


 


 317 mg/dL


(70-99)


 


Calcium Level


 


 


 


 7.6 mg/dL


(8.5-10.1)


 


Total Bilirubin


 


 


 


 0.3 mg/dL


(0.2-1.0)


 


Aspartate Amino Transf


(AST/SGOT) 


 


 


 117 U/L


(15-37)


 


Alanine Aminotransferase


(ALT/SGPT) 


 


 


 301 U/L


(14-59)


 


Alkaline Phosphatase


 


 


 


 162 U/L


()


 


Total Protein


 


 


 


 4.7 g/dL


(6.4-8.2)


 


Albumin


 


 


 


 1.8 g/dL


(3.4-5.0)


 


Albumin/Globulin Ratio    0.6 (1.0-1.7) 


 


Test


 10/17/20


08:20 


 


 





 


O2 Saturation 95 % (92-99)    


 


Arterial Blood pH


 7.46


(7.35-7.45) 


 


 





 


Arterial Blood pCO2 at


Patient Temp 39 mmHg


(35-46) 


 


 





 


Arterial Blood pO2 at Patient


Temp 78 mmHg


() 


 


 





 


Arterial Blood HCO3


 27 mmol/L


(21-28) 


 


 





 


Arterial Blood Base Excess


 3 mmol/L


(-3-3) 


 


 





 


FiO2 60% vent    








Medications





Active Scripts








 Medications  Dose


 Route/Sig


 Max Daily Dose Days Date Category


 


 Cymbalta


  (Duloxetine Hcl)


 20 Mg Capsule.dr  20 Mg


 PO DAILY


   9/22/20 Reported


 


 Lisinopril 5 Mg


 Tablet  5 Mg


 PO DAILY


   9/22/20 Reported


 


 Amitriptyline Hcl


 10 Mg Tablet  10 Mg


 PO DAILY


   9/22/20 Reported


 


 Trulicity


  (Dulaglutide) 1.5


 Mg/0.5 Ml


 Pen.injctr  1.5 Mg


 SQ


   9/22/20 Reported


 


 Nesina


  (Alogliptin


 Benzoate) 12.5 Mg


 Tablet  12.5 Mg


 PO


   9/22/20 Reported


 


 Propranolol Hcl


 80 Mg Tablet  80 Mg


 PO DAILY


   5/14/14 Reported


 


 Cymbalta


  (Duloxetine Hcl)


 60 Mg Capsule.dr  60 Mg


 PO DAILY


   5/14/14 Reported








Comments


CXR 10/17 reviewed





1. ET tube terminates 3.7 cm above the hiren.


2. Lung volumes are lower and show increasing consolidation at the left 


lung base.-





Impression


.


IMPRESSION:


1.  Acute hypoxemic respiratory failure.-- S/P intubation on 10/12/20 


2.  COVID-19 viral pneumonia.


3.  Abnormal chest x-ray, possible gram-positive, gram-negative pneumonia.


4.  Headaches.


5.  Type 2 diabetes.


6.  Fibromyalgia.


7.  Morbid obesity.


8.  Nonalcoholic steatohepatitis.


9.  Obstructive sleep apnea.


10. Abnormal D-Dimer due to COVID, improving 10/15





Plan


.


Continue current vent support. 02 titration as tolerated


cxr abg reviewed 


Tylenol for fever and trend 


S/P plasma, cont. full course of remdesivir 


Cont. steroids 


ABX per ID: [email or web address] 


Follow clinical course 


DVT/GI PPX; protonix and BID lovenox 





D/W RN and RT 


critically ill


cc time 30 min no overlap











MIHIR BROWER MD               Oct 17, 2020 10:43

## 2020-10-17 NOTE — RAD
PROCEDURE: CHEST AP ONLY

 

STUDY DATE: 10/17/2020

 

CLINICAL INDICATION / HISTORY: Reason: intubated ,recheck ETT advancement 

/ Spl. Instructions:  / History: .

 

TECHNIQUE: Portable single view chest.

 

COMPARISON: 10/16/2020 chest x-ray

 

FINDINGS: Patient remains intubated. ET tube terminates 3.7 cm above the 

hiren. An enteric tube passes below the diaphragms. Patient's right 

subclavian approach central vascular catheter remains present, projecting 

across midline towards the contralateral innominate vein.

 

Normal cardiomediastinal contours. Lung volumes are lower and show 

increasing consolidation at the left lung base, superimposed on more 

generalized interstitial opacities. The bones are unremarkable.  The 

visualized bowel gas is normal.

 

IMPRESSION: 

1. ET tube terminates 3.7 cm above the hiren.

2. Lung volumes are lower and show increasing consolidation at the left 

lung base.

 

Electronically signed by: Werner Bneitez MD (10/17/2020 7:36 AM) 

[id number]

## 2020-10-17 NOTE — NUR
Pharmacy Vancomycin Dosing Note



S:Consulted to monitor and dose vancomycin started 10/16/20.



O:FRANKO LABRECHT is a 45 year old F with 

Empiric .



Height: 5 feet, 3 inches

Weight: 98.5 kg

Ideal Body Weight: 52.40 

Adjusted Body Weight: 70.84 

Dosing Weight: 



Other Antibiotics: 

zosyn 3.375 gm



LABS:

Last BUN: 22 

Last Creatinine: 0.7 

Creatinine Clearance: 115 mL/min

Last WBC: 10.3 

Last Procalcitonin: -- 

Tmax (past 24 hours): 101.7 



Microbiology: 

10/15: blood cx: NGTD



I/O: 3412/ 2400 



Drug Levels:

Last Trough level: 6.3, DRAWN LATE, TT~7.1  on 10/17/20 at 1935 

Last dose given 10/17/20 at 0835 



Vancomycin Dosing:

Loading Dose: 2000 mg x1

Dosing Weight: 

Target Trough: 15-20





A: Based on: SUBTHERAPEUTIC TROUGH,





P: 1. INITIATE NEW REGIMEN OF Vancomycin 1500 mg IV q8h

   2. Follow up Trough level on 10/18/20 at 2100 

   3. Pharmacy will continue to monitor, follow and adjust therapy as needed.

 

 KATYA DE DIOS [facility], 10/17/20 0420

## 2020-10-17 NOTE — NUR
No sedation vacation today. Miss charted FIO2 decreased to 50%.... was NOT and remained at 
60 %.. Sedation is heavy w/o adjustment or med bolus. Cont  POC

## 2020-10-17 NOTE — PN
DATE:  10/17/2020



SUBJECTIVE:  The patient continued to be sedated, intubated and mechanically

ventilated.  She is maintaining her oxygen saturation at 96% on FiO2 of 60% with

a PEEP of 8.  Unfortunately, she continued to spike her temperature up to 101.7.



PHYSICAL EXAMINATION:

GENERAL:  When I examined her this morning, she looked pale, but no jaundice,

cyanosis or thyromegaly.  No jugular venous distention.  No limb edema.

VITAL SIGNS:  Her heart rate was 85, blood pressure was 133/70, temperature was

101.7, respiratory rate was 20, and oxygen saturation was 96%.

HEAD, EYES, EARS, NOSE AND THROAT:  Normocephalic, atraumatic.

NECK:  Supple.

HEART:  Showed normal first and second heart sounds.  No gallop, rub or murmur.

CHEST:  Showed central trachea, equal bilateral chest expansion, air entry,

vesicular sounds.  No crepitation or rhonchi anteriorly.

ABDOMEN:  Distended, soft, and nontender.

NEUROLOGIC:  She is heavily sedated.



DIAGNOSTIC DATA:  Her chest x-ray done this morning showed that the endotracheal

tube terminates at 3.7 cm above the hiren.  She has lung volumes are lower and

showed increasing consolidation of the left lung base.



LABORATORY DATA:  Her lab work this morning showed white cell count of 10,300,

hemoglobin 11.7, hematocrit 36, MCV 90 and platelet count 243,000.  Her

chemistry showed a serum sodium 139, potassium 5.3, chloride 103, bicarbonate

30, anion gap of 6, BUN 22, and creatinine 0.7.  Estimated GFR was 90 mL per

minute.  Her glucose was 117 and calcium was 7.6.  Total bilirubin is normal. 

Her AST, ALT, alkaline phosphatase are elevated, but are trending down.  Her

total protein 4.7.  Albumin was 1.8.  Her blood gases this morning showed a pH

of 7.38, pCO2 of 45, pO2 of 138, bicarbonate 26 and oxygen saturation was 99% on

FiO2 of 60%.  So far, all her blood cultures are negative.



ASSESSMENT:

1.  COVID-19 pneumonia.

2.  Acute hypoxic respiratory failure requiring intubation and mechanical

ventilation.  She is now maintaining her oxygen saturation at 98% on FiO2 of

60%.

3.  She continued to spike a temperature, this morning her temperature up to

101.7.  Her antibiotics were stopped 2 days ago; however, I did empirically

start her yesterday on vancomycin and Zosyn.

4.  The patient has multiple other medical problems including:

A. Migraine headache.

B.  Type 2 diabetes mellitus, suboptimally controlled.

C.  Fibromyalgia.

D.  Hypertension.

E.  Nephrolithiasis.

F.  Nonalcoholic steatohepatitis.

G.  Obstructive sleep apnea.



PLAN:  To continue with mechanical ventilation and wean as tolerated.  Continue

with IV antibiotic.  Continue with IV Solu-Medrol.  The patient was treated with

remdesivir and convalescent plasma and she is actually ____ improving.  We will

continue with DVT prophylaxis.  I will increase her insulin as her blood sugar

continued to be suboptimally controlled.  She is now on 40 units of Lantus twice

a day and she is on NovoLog insulin 18 units subcutaneously every 6 hours.

 



______________________________

JULIO CISSE MD



DR:  GOKUL/glo  JOB#:  [id number] / [id number]

DD:  10/17/2020 07:58  DT:  10/17/2020 08:19

## 2020-10-17 NOTE — PDOC
Infectious Disease Note


Subjective


Subjective


intubated on vent





DANG LEONG


Does have diarrhea





Vital Sign


Vital Signs





Vital Signs








  Date Time  Temp Pulse Resp B/P (MAP) Pulse Ox O2 Delivery O2 Flow Rate FiO2


 


10/17/20 08:42  85  133/70    


 


10/17/20 07:41     96 Ventilator  


 


10/17/20 06:00 101.7  20     





 101.7       


 


10/16/20 07:57       15.0 











Physical Exam


PHYSICAL EXAM


GENERAL:  intubated on vent


HEENT:  Normocephalic, atraumatic.  Anicteric.


NECK:  Supple.  No thyromegaly.


LUNGS:  Coarse breath sounds.  No wheezing.


HEART:  S1, S2, no murmurs.


ABDOMEN:  Obese, soft, nontender, bowel sounds present.


EXTREMITIES:  No edema, no cyanosis.


DERMATOLOGIC:  Warm, dry.  No generalized rash.  Multiple tattoos.


CENTRAL NERVOUS SYSTEM:  sedated intubated





Labs


Lab





Laboratory Tests








Test


 10/16/20


13:03 10/16/20


16:59 10/17/20


00:22 10/17/20


05:21


 


Glucose (Fingerstick)


 327 mg/dL


(70-99) 260 mg/dL


(70-99) 248 mg/dL


(70-99) 





 


White Blood Count


 


 


 


 10.3 x10^3/uL


(4.0-11.0)


 


Red Blood Count


 


 


 


 4.03 x10^6/uL


(3.50-5.40)


 


Hemoglobin


 


 


 


 11.7 g/dL


(12.0-15.5)


 


Hematocrit


 


 


 


 36.4 %


(36.0-47.0)


 


Mean Corpuscular Volume    90 fL () 


 


Mean Corpuscular Hemoglobin    29 pg (25-35) 


 


Mean Corpuscular Hemoglobin


Concent 


 


 


 32 g/dL


(31-37)


 


Red Cell Distribution Width


 


 


 


 14.2 %


(11.5-14.5)


 


Platelet Count


 


 


 


 243 x10^3/uL


(140-400)


 


Sodium Level


 


 


 


 139 mmol/L


(136-145)


 


Potassium Level


 


 


 


 5.3 mmol/L


(3.5-5.1)


 


Chloride Level


 


 


 


 103 mmol/L


()


 


Carbon Dioxide Level


 


 


 


 30 mmol/L


(21-32)


 


Anion Gap    6 (6-14) 


 


Blood Urea Nitrogen


 


 


 


 22 mg/dL


(7-20)


 


Creatinine


 


 


 


 0.7 mg/dL


(0.6-1.0)


 


Estimated GFR


(Cockcroft-Gault) 


 


 


 90.9 





 


BUN/Creatinine Ratio    31 (6-20) 


 


Glucose Level


 


 


 


 317 mg/dL


(70-99)


 


Calcium Level


 


 


 


 7.6 mg/dL


(8.5-10.1)


 


Total Bilirubin


 


 


 


 0.3 mg/dL


(0.2-1.0)


 


Aspartate Amino Transf


(AST/SGOT) 


 


 


 117 U/L


(15-37)


 


Alanine Aminotransferase


(ALT/SGPT) 


 


 


 301 U/L


(14-59)


 


Alkaline Phosphatase


 


 


 


 162 U/L


()


 


Total Protein


 


 


 


 4.7 g/dL


(6.4-8.2)


 


Albumin


 


 


 


 1.8 g/dL


(3.4-5.0)


 


Albumin/Globulin Ratio    0.6 (1.0-1.7) 


 


Test


 10/17/20


08:20 


 


 





 


O2 Saturation 95 % (92-99)    


 


Arterial Blood pH


 7.46


(7.35-7.45) 


 


 





 


Arterial Blood pCO2 at


Patient Temp 39 mmHg


(35-46) 


 


 





 


Arterial Blood pO2 at Patient


Temp 78 mmHg


() 


 


 





 


Arterial Blood HCO3


 27 mmol/L


(21-28) 


 


 





 


Arterial Blood Base Excess


 3 mmol/L


(-3-3) 


 


 





 


FiO2 60% vent    








Micro





Microbiology


10/15/20 Blood Culture - Preliminary, Resulted


           NO GROWTH AFTER 1 DAY





Objective


Assessment


1.  Acute hypoxic respiratory failure.


2.  COVID-19 Pneumonia


3.  Migraine headaches.


4.  Diabetes.


5.  Obesity.


6.  Fibromyalgia.


7.  Abnormal LFTs.





Plan


Plan of Care


cont steroids


cont Remdesivir


supportive care


HEDY Calzada MD               Oct 17, 2020 09:31

## 2020-10-18 VITALS — SYSTOLIC BLOOD PRESSURE: 138 MMHG | DIASTOLIC BLOOD PRESSURE: 74 MMHG

## 2020-10-18 VITALS — SYSTOLIC BLOOD PRESSURE: 108 MMHG | DIASTOLIC BLOOD PRESSURE: 58 MMHG

## 2020-10-18 VITALS — DIASTOLIC BLOOD PRESSURE: 84 MMHG | SYSTOLIC BLOOD PRESSURE: 134 MMHG

## 2020-10-18 VITALS — SYSTOLIC BLOOD PRESSURE: 143 MMHG | DIASTOLIC BLOOD PRESSURE: 78 MMHG

## 2020-10-18 VITALS — DIASTOLIC BLOOD PRESSURE: 72 MMHG | SYSTOLIC BLOOD PRESSURE: 84 MMHG

## 2020-10-18 VITALS — DIASTOLIC BLOOD PRESSURE: 82 MMHG | SYSTOLIC BLOOD PRESSURE: 108 MMHG

## 2020-10-18 VITALS — DIASTOLIC BLOOD PRESSURE: 90 MMHG | SYSTOLIC BLOOD PRESSURE: 156 MMHG

## 2020-10-18 VITALS — SYSTOLIC BLOOD PRESSURE: 138 MMHG | DIASTOLIC BLOOD PRESSURE: 76 MMHG

## 2020-10-18 VITALS — SYSTOLIC BLOOD PRESSURE: 150 MMHG | DIASTOLIC BLOOD PRESSURE: 90 MMHG

## 2020-10-18 VITALS — DIASTOLIC BLOOD PRESSURE: 92 MMHG | SYSTOLIC BLOOD PRESSURE: 162 MMHG

## 2020-10-18 VITALS — SYSTOLIC BLOOD PRESSURE: 150 MMHG | DIASTOLIC BLOOD PRESSURE: 84 MMHG

## 2020-10-18 VITALS — DIASTOLIC BLOOD PRESSURE: 60 MMHG | SYSTOLIC BLOOD PRESSURE: 104 MMHG

## 2020-10-18 VITALS — SYSTOLIC BLOOD PRESSURE: 151 MMHG | DIASTOLIC BLOOD PRESSURE: 83 MMHG

## 2020-10-18 VITALS — SYSTOLIC BLOOD PRESSURE: 114 MMHG | DIASTOLIC BLOOD PRESSURE: 64 MMHG

## 2020-10-18 VITALS — DIASTOLIC BLOOD PRESSURE: 72 MMHG | SYSTOLIC BLOOD PRESSURE: 124 MMHG

## 2020-10-18 VITALS — SYSTOLIC BLOOD PRESSURE: 120 MMHG | DIASTOLIC BLOOD PRESSURE: 68 MMHG

## 2020-10-18 VITALS — SYSTOLIC BLOOD PRESSURE: 126 MMHG | DIASTOLIC BLOOD PRESSURE: 74 MMHG

## 2020-10-18 VITALS — DIASTOLIC BLOOD PRESSURE: 94 MMHG | SYSTOLIC BLOOD PRESSURE: 132 MMHG

## 2020-10-18 VITALS — SYSTOLIC BLOOD PRESSURE: 140 MMHG | DIASTOLIC BLOOD PRESSURE: 77 MMHG

## 2020-10-18 VITALS — DIASTOLIC BLOOD PRESSURE: 92 MMHG | SYSTOLIC BLOOD PRESSURE: 166 MMHG

## 2020-10-18 VITALS — DIASTOLIC BLOOD PRESSURE: 89 MMHG | SYSTOLIC BLOOD PRESSURE: 163 MMHG

## 2020-10-18 LAB
ALBUMIN SERPL-MCNC: 1.7 G/DL (ref 3.4–5)
ALBUMIN/GLOB SERPL: 0.5 {RATIO} (ref 1–1.7)
ALP SERPL-CCNC: 116 U/L (ref 46–116)
ALT SERPL-CCNC: 191 U/L (ref 14–59)
ANION GAP SERPL CALC-SCNC: 6 MMOL/L (ref 6–14)
AST SERPL-CCNC: 67 U/L (ref 15–37)
BASE EXCESS ABG: 1 MMOL/L (ref -3–3)
BILIRUB SERPL-MCNC: 0.3 MG/DL (ref 0.2–1)
BUN SERPL-MCNC: 23 MG/DL (ref 7–20)
BUN/CREAT SERPL: 38 (ref 6–20)
CALCIUM SERPL-MCNC: 7.9 MG/DL (ref 8.5–10.1)
CHLORIDE SERPL-SCNC: 103 MMOL/L (ref 98–107)
CO2 SERPL-SCNC: 30 MMOL/L (ref 21–32)
CREAT SERPL-MCNC: 0.6 MG/DL (ref 0.6–1)
ERYTHROCYTE [DISTWIDTH] IN BLOOD BY AUTOMATED COUNT: 13.8 % (ref 11.5–14.5)
GFR SERPLBLD BASED ON 1.73 SQ M-ARVRAT: 108.6 ML/MIN
GLUCOSE SERPL-MCNC: 306 MG/DL (ref 70–99)
HCO3 BLDA-SCNC: 25 MMOL/L (ref 21–28)
HCT VFR BLD CALC: 35.2 % (ref 36–47)
HGB BLD-MCNC: 11.4 G/DL (ref 12–15.5)
INSPIRATION SETTING TIME VENT: 60
MCH RBC QN AUTO: 29 PG (ref 25–35)
MCHC RBC AUTO-ENTMCNC: 32 G/DL (ref 31–37)
MCV RBC AUTO: 91 FL (ref 79–100)
PCO2 BLDA: 37 MMHG (ref 35–46)
PLATELET # BLD AUTO: 215 X10^3/UL (ref 140–400)
PO2 BLDA: 77 MMHG (ref 75–108)
POTASSIUM SERPL-SCNC: 4.6 MMOL/L (ref 3.5–5.1)
PROT SERPL-MCNC: 4.8 G/DL (ref 6.4–8.2)
RBC # BLD AUTO: 3.89 X10^6/UL (ref 3.5–5.4)
SAO2 % BLDA: 95 % (ref 92–99)
SODIUM SERPL-SCNC: 139 MMOL/L (ref 136–145)
VANC TR: 13.8 MCG/ML (ref 10–20)
WBC # BLD AUTO: 10 X10^3/UL (ref 4–11)

## 2020-10-18 PROCEDURE — 5A09357 ASSISTANCE WITH RESPIRATORY VENTILATION, LESS THAN 24 CONSECUTIVE HOURS, CONTINUOUS POSITIVE AIRWAY PRESSURE: ICD-10-PCS | Performed by: INTERNAL MEDICINE

## 2020-10-18 PROCEDURE — 5A1955Z RESPIRATORY VENTILATION, GREATER THAN 96 CONSECUTIVE HOURS: ICD-10-PCS | Performed by: INTERNAL MEDICINE

## 2020-10-18 RX ADMIN — METHYLPREDNISOLONE SODIUM SUCCINATE SCH MG: 40 INJECTION, POWDER, FOR SOLUTION INTRAMUSCULAR; INTRAVENOUS at 14:00

## 2020-10-18 RX ADMIN — ENOXAPARIN SODIUM SCH MG: 40 INJECTION SUBCUTANEOUS at 10:58

## 2020-10-18 RX ADMIN — INSULIN LISPRO SCH UNITS: 100 INJECTION, SOLUTION INTRAVENOUS; SUBCUTANEOUS at 11:18

## 2020-10-18 RX ADMIN — INSULIN LISPRO SCH UNITS: 100 INJECTION, SOLUTION INTRAVENOUS; SUBCUTANEOUS at 05:38

## 2020-10-18 RX ADMIN — INSULIN GLARGINE SCH UNIT: 100 INJECTION, SOLUTION SUBCUTANEOUS at 10:00

## 2020-10-18 RX ADMIN — MIDAZOLAM PRN MLS/HR: 5 INJECTION, SOLUTION INTRAMUSCULAR; INTRAVENOUS at 09:44

## 2020-10-18 RX ADMIN — VANCOMYCIN HYDROCHLORIDE PRN EACH: 1 INJECTION, POWDER, LYOPHILIZED, FOR SOLUTION INTRAVENOUS at 23:29

## 2020-10-18 RX ADMIN — MIDAZOLAM PRN MLS/HR: 5 INJECTION, SOLUTION INTRAMUSCULAR; INTRAVENOUS at 20:49

## 2020-10-18 RX ADMIN — INSULIN LISPRO SCH UNITS: 100 INJECTION, SOLUTION INTRAVENOUS; SUBCUTANEOUS at 08:00

## 2020-10-18 RX ADMIN — METHYLPREDNISOLONE SODIUM SUCCINATE SCH MG: 40 INJECTION, POWDER, FOR SOLUTION INTRAMUSCULAR; INTRAVENOUS at 05:36

## 2020-10-18 RX ADMIN — PIPERACILLIN SODIUM AND TAZOBACTAM SODIUM SCH MLS/HR: 3; .375 INJECTION, POWDER, LYOPHILIZED, FOR SOLUTION INTRAVENOUS at 05:36

## 2020-10-18 RX ADMIN — INSULIN LISPRO SCH UNITS: 100 INJECTION, SOLUTION INTRAVENOUS; SUBCUTANEOUS at 14:23

## 2020-10-18 RX ADMIN — PIPERACILLIN SODIUM AND TAZOBACTAM SODIUM SCH MLS/HR: 3; .375 INJECTION, POWDER, LYOPHILIZED, FOR SOLUTION INTRAVENOUS at 14:20

## 2020-10-18 RX ADMIN — INSULIN LISPRO SCH UNITS: 100 INJECTION, SOLUTION INTRAVENOUS; SUBCUTANEOUS at 17:25

## 2020-10-18 RX ADMIN — ENOXAPARIN SODIUM SCH MG: 40 INJECTION SUBCUTANEOUS at 20:50

## 2020-10-18 RX ADMIN — INSULIN GLARGINE SCH UNIT: 100 INJECTION, SOLUTION SUBCUTANEOUS at 08:50

## 2020-10-18 RX ADMIN — LINAGLIPTIN SCH MG: 5 TABLET, FILM COATED ORAL at 10:42

## 2020-10-18 RX ADMIN — VANCOMYCIN HYDROCHLORIDE SCH MLS/HR: 1 INJECTION, POWDER, FOR SOLUTION INTRAVENOUS at 21:49

## 2020-10-18 RX ADMIN — PIPERACILLIN SODIUM AND TAZOBACTAM SODIUM SCH MLS/HR: 3; .375 INJECTION, POWDER, LYOPHILIZED, FOR SOLUTION INTRAVENOUS at 00:26

## 2020-10-18 RX ADMIN — VANCOMYCIN HYDROCHLORIDE SCH MLS/HR: 1 INJECTION, POWDER, FOR SOLUTION INTRAVENOUS at 14:19

## 2020-10-18 RX ADMIN — INSULIN LISPRO SCH UNITS: 100 INJECTION, SOLUTION INTRAVENOUS; SUBCUTANEOUS at 17:24

## 2020-10-18 RX ADMIN — VERAPAMIL HYDROCHLORIDE SCH MG: 120 TABLET, FILM COATED, EXTENDED RELEASE ORAL at 09:59

## 2020-10-18 RX ADMIN — VANCOMYCIN HYDROCHLORIDE SCH MLS/HR: 1 INJECTION, POWDER, FOR SOLUTION INTRAVENOUS at 06:00

## 2020-10-18 RX ADMIN — METHYLPREDNISOLONE SODIUM SUCCINATE SCH MG: 40 INJECTION, POWDER, FOR SOLUTION INTRAMUSCULAR; INTRAVENOUS at 20:50

## 2020-10-18 RX ADMIN — VANCOMYCIN HYDROCHLORIDE PRN EACH: 1 INJECTION, POWDER, LYOPHILIZED, FOR SOLUTION INTRAVENOUS at 07:20

## 2020-10-18 RX ADMIN — VANCOMYCIN HYDROCHLORIDE PRN EACH: 1 INJECTION, POWDER, LYOPHILIZED, FOR SOLUTION INTRAVENOUS at 23:33

## 2020-10-18 RX ADMIN — PANTOPRAZOLE SODIUM SCH MG: 40 INJECTION, POWDER, FOR SOLUTION INTRAVENOUS at 10:54

## 2020-10-18 RX ADMIN — INSULIN LISPRO SCH UNITS: 100 INJECTION, SOLUTION INTRAVENOUS; SUBCUTANEOUS at 05:37

## 2020-10-18 RX ADMIN — LISINOPRIL SCH MG: 5 TABLET ORAL at 10:53

## 2020-10-18 RX ADMIN — INSULIN GLARGINE SCH UNIT: 100 INJECTION, SOLUTION SUBCUTANEOUS at 21:47

## 2020-10-18 RX ADMIN — INSULIN LISPRO SCH UNITS: 100 INJECTION, SOLUTION INTRAVENOUS; SUBCUTANEOUS at 00:09

## 2020-10-18 RX ADMIN — MIDAZOLAM PRN MLS/HR: 5 INJECTION, SOLUTION INTRAMUSCULAR; INTRAVENOUS at 20:42

## 2020-10-18 RX ADMIN — PIPERACILLIN SODIUM AND TAZOBACTAM SODIUM SCH MLS/HR: 3; .375 INJECTION, POWDER, LYOPHILIZED, FOR SOLUTION INTRAVENOUS at 17:14

## 2020-10-18 RX ADMIN — VERAPAMIL HYDROCHLORIDE SCH MG: 120 TABLET, FILM COATED, EXTENDED RELEASE ORAL at 20:51

## 2020-10-18 NOTE — PDOC
PULMONARY PROGRESS NOTES


DATE: 10/18/20 


TIME: 10:10


Subjective


Patient was intubated on 10/12/20 for increased resp. distress, sedated on 

versed, fentanyl.. mod ett secretion peep [id number] 60% 


remains intubated/ sedated 


fever of 101.7 overnight 


No overnight concerns from nursing


Vitals





Vital Signs








  Date Time  Temp Pulse Resp B/P (MAP) Pulse Ox O2 Delivery O2 Flow Rate FiO2


 


10/18/20 08:25     99 Ventilator  


 


10/18/20 06:00  72 20 163/89 (113)    


 


10/18/20 05:00 98.9       





 98.9       


 


10/17/20 07:17       15.0 








Comments


Pt. is seen during covid 19 pandemic, visual exam preformed 


on vent sedated


NC AT


no accessory muscle use


RRR


no edema or rash noted


Labs





Laboratory Tests








Test


 10/16/20


13:03 10/16/20


16:59 10/17/20


00:22 10/17/20


05:21


 


Glucose (Fingerstick)


 327 mg/dL


(70-99) 260 mg/dL


(70-99) 248 mg/dL


(70-99) 





 


White Blood Count


 


 


 


 10.3 x10^3/uL


(4.0-11.0)


 


Red Blood Count


 


 


 


 4.03 x10^6/uL


(3.50-5.40)


 


Hemoglobin


 


 


 


 11.7 g/dL


(12.0-15.5)


 


Hematocrit


 


 


 


 36.4 %


(36.0-47.0)


 


Mean Corpuscular Volume    90 fL () 


 


Mean Corpuscular Hemoglobin    29 pg (25-35) 


 


Mean Corpuscular Hemoglobin


Concent 


 


 


 32 g/dL


(31-37)


 


Red Cell Distribution Width


 


 


 


 14.2 %


(11.5-14.5)


 


Platelet Count


 


 


 


 243 x10^3/uL


(140-400)


 


Sodium Level


 


 


 


 139 mmol/L


(136-145)


 


Potassium Level


 


 


 


 5.3 mmol/L


(3.5-5.1)


 


Chloride Level


 


 


 


 103 mmol/L


()


 


Carbon Dioxide Level


 


 


 


 30 mmol/L


(21-32)


 


Anion Gap    6 (6-14) 


 


Blood Urea Nitrogen


 


 


 


 22 mg/dL


(7-20)


 


Creatinine


 


 


 


 0.7 mg/dL


(0.6-1.0)


 


Estimated GFR


(Cockcroft-Gault) 


 


 


 90.9 





 


BUN/Creatinine Ratio    31 (6-20) 


 


Glucose Level


 


 


 


 317 mg/dL


(70-99)


 


Calcium Level


 


 


 


 7.6 mg/dL


(8.5-10.1)


 


Total Bilirubin


 


 


 


 0.3 mg/dL


(0.2-1.0)


 


Aspartate Amino Transf


(AST/SGOT) 


 


 


 117 U/L


(15-37)


 


Alanine Aminotransferase


(ALT/SGPT) 


 


 


 301 U/L


(14-59)


 


Alkaline Phosphatase


 


 


 


 162 U/L


()


 


Total Protein


 


 


 


 4.7 g/dL


(6.4-8.2)


 


Albumin


 


 


 


 1.8 g/dL


(3.4-5.0)


 


Albumin/Globulin Ratio    0.6 (1.0-1.7) 


 


Test


 10/17/20


08:20 10/17/20


13:14 10/17/20


18:47 10/17/20


19:45


 


O2 Saturation 95 % (92-99)    


 


Arterial Blood pH


 7.46


(7.35-7.45) 


 


 





 


Arterial Blood pCO2 at


Patient Temp 39 mmHg


(35-46) 


 


 





 


Arterial Blood pO2 at Patient


Temp 78 mmHg


() 


 


 





 


Arterial Blood HCO3


 27 mmol/L


(21-28) 


 


 





 


Arterial Blood Base Excess


 3 mmol/L


(-3-3) 


 


 





 


FiO2 60% vent    


 


Glucose (Fingerstick)


 


 248 mg/dL


(70-99) 249 mg/dL


(70-99) 





 


Vancomycin Level Trough


 


 


 


 6.3 mcg/mL


(10.0-20.0)


 


Vancomycin Last Dose Date     


 


Vancomycin Last Dose Time     


 


Test


 10/18/20


00:00 10/18/20


05:10 10/18/20


05:20 10/18/20


08:26


 


Glucose (Fingerstick)


 289 mg/dL


(70-99) 284 mg/dL


(70-99) 


 





 


White Blood Count


 


 


 10.0 x10^3/uL


(4.0-11.0) 





 


Red Blood Count


 


 


 3.89 x10^6/uL


(3.50-5.40) 





 


Hemoglobin


 


 


 11.4 g/dL


(12.0-15.5) 





 


Hematocrit


 


 


 35.2 %


(36.0-47.0) 





 


Mean Corpuscular Volume   91 fL ()  


 


Mean Corpuscular Hemoglobin   29 pg (25-35)  


 


Mean Corpuscular Hemoglobin


Concent 


 


 32 g/dL


(31-37) 





 


Red Cell Distribution Width


 


 


 13.8 %


(11.5-14.5) 





 


Platelet Count


 


 


 215 x10^3/uL


(140-400) 





 


Sodium Level


 


 


 139 mmol/L


(136-145) 





 


Potassium Level


 


 


 4.6 mmol/L


(3.5-5.1) 





 


Chloride Level


 


 


 103 mmol/L


() 





 


Carbon Dioxide Level


 


 


 30 mmol/L


(21-32) 





 


Anion Gap   6 (6-14)  


 


Blood Urea Nitrogen


 


 


 23 mg/dL


(7-20) 





 


Creatinine


 


 


 0.6 mg/dL


(0.6-1.0) 





 


Estimated GFR


(Cockcroft-Gault) 


 


 108.6 


 





 


BUN/Creatinine Ratio   38 (6-20)  


 


Glucose Level


 


 


 306 mg/dL


(70-99) 





 


Calcium Level


 


 


 7.9 mg/dL


(8.5-10.1) 





 


Total Bilirubin


 


 


 0.3 mg/dL


(0.2-1.0) 





 


Aspartate Amino Transf


(AST/SGOT) 


 


 67 U/L (15-37) 


 





 


Alanine Aminotransferase


(ALT/SGPT) 


 


 191 U/L


(14-59) 





 


Alkaline Phosphatase


 


 


 116 U/L


() 





 


Total Protein


 


 


 4.8 g/dL


(6.4-8.2) 





 


Albumin


 


 


 1.7 g/dL


(3.4-5.0) 





 


Albumin/Globulin Ratio   0.5 (1.0-1.7)  


 


O2 Saturation    95 % (92-99) 


 


Arterial Blood pH


 


 


 


 7.44


(7.35-7.45)


 


Arterial Blood pCO2 at


Patient Temp 


 


 


 37 mmHg


(35-46)


 


Arterial Blood pO2 at Patient


Temp 


 


 


 77 mmHg


()


 


Arterial Blood HCO3


 


 


 


 25 mmol/L


(21-28)


 


Arterial Blood Base Excess


 


 


 


 1 mmol/L


(-3-3)


 


FiO2    60 








Laboratory Tests








Test


 10/17/20


13:14 10/17/20


18:47 10/17/20


19:45 10/18/20


00:00


 


Glucose (Fingerstick)


 248 mg/dL


(70-99) 249 mg/dL


(70-99) 


 289 mg/dL


(70-99)


 


Vancomycin Level Trough


 


 


 6.3 mcg/mL


(10.0-20.0) 





 


Vancomycin Last Dose Date     


 


Vancomycin Last Dose Time     


 


Test


 10/18/20


05:10 10/18/20


05:20 10/18/20


08:26 





 


Glucose (Fingerstick)


 284 mg/dL


(70-99) 


 


 





 


White Blood Count


 


 10.0 x10^3/uL


(4.0-11.0) 


 





 


Red Blood Count


 


 3.89 x10^6/uL


(3.50-5.40) 


 





 


Hemoglobin


 


 11.4 g/dL


(12.0-15.5) 


 





 


Hematocrit


 


 35.2 %


(36.0-47.0) 


 





 


Mean Corpuscular Volume  91 fL ()   


 


Mean Corpuscular Hemoglobin  29 pg (25-35)   


 


Mean Corpuscular Hemoglobin


Concent 


 32 g/dL


(31-37) 


 





 


Red Cell Distribution Width


 


 13.8 %


(11.5-14.5) 


 





 


Platelet Count


 


 215 x10^3/uL


(140-400) 


 





 


Sodium Level


 


 139 mmol/L


(136-145) 


 





 


Potassium Level


 


 4.6 mmol/L


(3.5-5.1) 


 





 


Chloride Level


 


 103 mmol/L


() 


 





 


Carbon Dioxide Level


 


 30 mmol/L


(21-32) 


 





 


Anion Gap  6 (6-14)   


 


Blood Urea Nitrogen


 


 23 mg/dL


(7-20) 


 





 


Creatinine


 


 0.6 mg/dL


(0.6-1.0) 


 





 


Estimated GFR


(Cockcroft-Gault) 


 108.6 


 


 





 


BUN/Creatinine Ratio  38 (6-20)   


 


Glucose Level


 


 306 mg/dL


(70-99) 


 





 


Calcium Level


 


 7.9 mg/dL


(8.5-10.1) 


 





 


Total Bilirubin


 


 0.3 mg/dL


(0.2-1.0) 


 





 


Aspartate Amino Transf


(AST/SGOT) 


 67 U/L (15-37) 


 


 





 


Alanine Aminotransferase


(ALT/SGPT) 


 191 U/L


(14-59) 


 





 


Alkaline Phosphatase


 


 116 U/L


() 


 





 


Total Protein


 


 4.8 g/dL


(6.4-8.2) 


 





 


Albumin


 


 1.7 g/dL


(3.4-5.0) 


 





 


Albumin/Globulin Ratio  0.5 (1.0-1.7)   


 


O2 Saturation   95 % (92-99)  


 


Arterial Blood pH


 


 


 7.44


(7.35-7.45) 





 


Arterial Blood pCO2 at


Patient Temp 


 


 37 mmHg


(35-46) 





 


Arterial Blood pO2 at Patient


Temp 


 


 77 mmHg


() 





 


Arterial Blood HCO3


 


 


 25 mmol/L


(21-28) 





 


Arterial Blood Base Excess


 


 


 1 mmol/L


(-3-3) 





 


FiO2   60  








Medications





Active Scripts








 Medications  Dose


 Route/Sig


 Max Daily Dose Days Date Category


 


 Cymbalta


  (Duloxetine Hcl)


 20 Mg Capsule.dr  20 Mg


 PO DAILY


   9/22/20 Reported


 


 Lisinopril 5 Mg


 Tablet  5 Mg


 PO DAILY


   9/22/20 Reported


 


 Amitriptyline Hcl


 10 Mg Tablet  10 Mg


 PO DAILY


   9/22/20 Reported


 


 Trulicity


  (Dulaglutide) 1.5


 Mg/0.5 Ml


 Pen.injctr  1.5 Mg


 SQ


   9/22/20 Reported


 


 Nesina


  (Alogliptin


 Benzoate) 12.5 Mg


 Tablet  12.5 Mg


 PO


   9/22/20 Reported


 


 Propranolol Hcl


 80 Mg Tablet  80 Mg


 PO DAILY


   5/14/14 Reported


 


 Cymbalta


  (Duloxetine Hcl)


 60 Mg Capsule.dr  60 Mg


 PO DAILY


   5/14/14 Reported








Comments


CXR 10/17 reviewed





1. ET tube terminates 3.7 cm above the hiren.


2. Lung volumes are lower and show increasing consolidation at the left 


lung base.-





Impression


.


IMPRESSION:


1.  Acute hypoxemic respiratory failure.-- S/P intubation on 10/12/20 


2.  COVID-19 viral pneumonia.


3.  Abnormal chest x-ray, possible gram-positive, gram-negative pneumonia.


4.  Headaches.


5.  Type 2 diabetes.


6.  Fibromyalgia.


7.  Morbid obesity.


8.  Nonalcoholic steatohepatitis.


9.  Obstructive sleep apnea.


10. Abnormal D-Dimer due to COVID, improving 10/15





Plan


.


Continue current vent support. 02 titration as tolerated


cxr abg reviewed 


Tylenol for fever and trend 


S/P plasma, cont. full course of remdesivir 


Cont. steroids 


ABX per ID: [email or web address] 


Follow clinical course 


DVT/GI PPX; protonix and BID lovenox 





D/W RN and RT 


critically ill


cc time 30 min no overlap











MIHIR BROWER MD               Oct 18, 2020 10:10

## 2020-10-18 NOTE — NUR
Pharmacy Vancomycin Dosing Note



S: Consulted to monitor and dose vancomycin started 10/16/20.



O: FRANKO ALBRECHT is a 45 year old F with 

Empiric, .



Other Antibiotics: 

zosyn 3.375 gm



LABS:

Last BUN: 23 

Last Creatinine: 0.6 

Creatinine Clearance: 135 mL/min

Last WBC: 10 

Last Procalcitonin: -- 

Tmax (past 24 hours): 99.4 



Microbiology:  

10/15: blood cx: NGTD



I/O: 2905 / 2425 



Drug Levels:

Last Random level: 13.8  on 10/18/20 at 2100 

Last dose given 10/18/20 at 1330 



Vancomycin Dosing:

Dosing Weight: 

Target Trough: 15-20





A: Based on: Trough, Actual Wt and CrCl





P: 1. 10/18/20 Continue  Vancomycin 1500 mg IV q8h

   2. Follow up Random level on 10/20/20 at 1300 

   3. Pharmacy will continue to monitor, follow and adjust therapy as needed.

 

 TRACIE SINGLETARY RPH, 10/18/20 2331

-------------------------------------------------------------------------------

Signed:    10/18/20 at 2332 by TRACIE SINGLETARY RPH PHA

-------------------------------------------------------------------------------

## 2020-10-18 NOTE — PN
DATE:  10/18/2020



SUBJECTIVE:  The patient continued to be intubated, mechanically ventilated and

heavily sedated.  She is maintaining her oxygen saturations 99% on FiO2 of 60%. 

Nursing staff stated that her blood pressure is suboptimally controlled. 

Otherwise, she is afebrile and has excellent urine output.



PHYSICAL EXAMINATION:

GENERAL:  When I examined her, she was pale, but no jaundice, cyanosis or

thyromegaly.  No jugular venous distention.  No lower limb edema.

VITAL SIGNS:  Her heart rate was 72, blood pressure was 163/89, temperature

98.9, respiratory rate 20, and oxygen saturation was 99% on FiO2 of 60%.

HEAD, EYES, EARS, NOSE AND THROAT:  Shows she is normocephalic, atraumatic.  She

has orotracheal and orogastric tube in place.

NECK:  Supple.

HEART:  Normal first and second heart sounds.  No gallop, rub or murmur.

CHEST:  Showed central trachea, equal bilateral chest expansion, air entry.  No

crepitation or rhonchi.

ABDOMEN:  Distended, soft, nontender.

NEUROLOGIC:  She is heavily sedated.



Her intake over the last 24 hours was 3400, output was 2400.



LABORATORY DATA:  As of this morning, her white cell count was 10,000;

hemoglobin 11; hematocrit 35; MCV 91 and platelet count 215,000.  Her serum

sodium was 139, potassium 5.3, chloride 103, bicarbonate 30, anion gap of 6, BUN

22, creatinine 0.7, estimated GFR was 90 mL per minute.  Her glucose continues

to be high at 317, calcium was 7.6.  Total bilirubin normal.  AST, ALT, alkaline

phosphatase slightly elevated, but trending down.  Her total protein was 4.7,

albumin was 1.8.  Her serum ferritin was 527.  Her D-dimer is trending down from

1.96-1.43.



ASSESSMENT:

1.  COVID-19 pneumonia.

2.  Acute hypoxic respiratory failure, required intubation, mechanical

ventilation.  She is now maintaining her oxygen saturation at 99% on FiO2 of

60%.

3.  She spiked her temperature up to 101.7 and we did send blood for culture and

sensitivity and started her empirically on IV vancomycin and Zosyn; however, her

blood cultures are so far negative.

4.  The patient has multiple other medical problems including:

A.  Migraine headache.

B.  Type 2 diabetes mellitus, seems to be suboptimally controlled despite

increasing Lantus and NovoLog insulin.

C.  Fibromyalgia.

D.  Hypertension.

E.  Nephrolithiasis.

F.  Nonalcoholic steatorrhea hepatitis.

G.  Obstructive sleep apnea.



PLAN:  Obviously to continue mechanical ventilation and wean as tolerated. 

Continue with IV antibiotic.  Continue with IV Solu-Medrol.  The patient has

received treatment with remdesivir and convalescent plasma and she seemed to be

improving.  Her FiO2 is down to 60%.  Her liver enzymes although elevated that

are trending down and her D-dimer is also trending down.  Meanwhile, we will

continue with DVT prophylaxis.  I will make more adjustment of her Lantus and

NovoLog insulin.

 



______________________________

JULIO CISSE MD



DR:  [email or web address]  JOB#:  [id number] / [id number]

DD:  10/18/2020 07:52  DT:  10/18/2020 10:00

## 2020-10-18 NOTE — PDOC
Infectious Disease Note


Subjective


Subjective


intubated on vent





Vital Sign


Vital Signs





Vital Signs








  Date Time  Temp Pulse Resp B/P (MAP) Pulse Ox O2 Delivery O2 Flow Rate FiO2


 


10/18/20 08:25     99 Ventilator  


 


10/18/20 06:00  72 20 163/89 (113)    


 


10/18/20 05:00 98.9       





 98.9       


 


10/17/20 07:17       15.0 











Physical Exam


PHYSICAL EXAM


GENERAL:  intubated on vent


HEENT:  Normocephalic, atraumatic.  Anicteric.


NECK:  Supple.  No thyromegaly.


LUNGS:  Coarse breath sounds.  No wheezing.


HEART:  S1, S2, no murmurs.


ABDOMEN:  Obese, soft, nontender, bowel sounds present.


EXTREMITIES:  No edema, no cyanosis.


DERMATOLOGIC:  Warm, dry.  No generalized rash.  Multiple tattoos.


CENTRAL NERVOUS SYSTEM:  sedated intubated





Labs


Lab





Laboratory Tests








Test


 10/17/20


13:14 10/17/20


18:47 10/17/20


19:45 10/18/20


00:00


 


Glucose (Fingerstick)


 248 mg/dL


(70-99) 249 mg/dL


(70-99) 


 289 mg/dL


(70-99)


 


Vancomycin Level Trough


 


 


 6.3 mcg/mL


(10.0-20.0) 





 


Vancomycin Last Dose Date     


 


Vancomycin Last Dose Time     


 


Test


 10/18/20


05:10 10/18/20


05:20 10/18/20


08:26 





 


Glucose (Fingerstick)


 284 mg/dL


(70-99) 


 


 





 


White Blood Count


 


 10.0 x10^3/uL


(4.0-11.0) 


 





 


Red Blood Count


 


 3.89 x10^6/uL


(3.50-5.40) 


 





 


Hemoglobin


 


 11.4 g/dL


(12.0-15.5) 


 





 


Hematocrit


 


 35.2 %


(36.0-47.0) 


 





 


Mean Corpuscular Volume  91 fL ()   


 


Mean Corpuscular Hemoglobin  29 pg (25-35)   


 


Mean Corpuscular Hemoglobin


Concent 


 32 g/dL


(31-37) 


 





 


Red Cell Distribution Width


 


 13.8 %


(11.5-14.5) 


 





 


Platelet Count


 


 215 x10^3/uL


(140-400) 


 





 


Sodium Level


 


 139 mmol/L


(136-145) 


 





 


Potassium Level


 


 4.6 mmol/L


(3.5-5.1) 


 





 


Chloride Level


 


 103 mmol/L


() 


 





 


Carbon Dioxide Level


 


 30 mmol/L


(21-32) 


 





 


Anion Gap  6 (6-14)   


 


Blood Urea Nitrogen


 


 23 mg/dL


(7-20) 


 





 


Creatinine


 


 0.6 mg/dL


(0.6-1.0) 


 





 


Estimated GFR


(Cockcroft-Gault) 


 108.6 


 


 





 


BUN/Creatinine Ratio  38 (6-20)   


 


Glucose Level


 


 306 mg/dL


(70-99) 


 





 


Calcium Level


 


 7.9 mg/dL


(8.5-10.1) 


 





 


Total Bilirubin


 


 0.3 mg/dL


(0.2-1.0) 


 





 


Aspartate Amino Transf


(AST/SGOT) 


 67 U/L (15-37) 


 


 





 


Alanine Aminotransferase


(ALT/SGPT) 


 191 U/L


(14-59) 


 





 


Alkaline Phosphatase


 


 116 U/L


() 


 





 


Total Protein


 


 4.8 g/dL


(6.4-8.2) 


 





 


Albumin


 


 1.7 g/dL


(3.4-5.0) 


 





 


Albumin/Globulin Ratio  0.5 (1.0-1.7)   


 


O2 Saturation   95 % (92-99)  


 


Arterial Blood pH


 


 


 7.44


(7.35-7.45) 





 


Arterial Blood pCO2 at


Patient Temp 


 


 37 mmHg


(35-46) 





 


Arterial Blood pO2 at Patient


Temp 


 


 77 mmHg


() 





 


Arterial Blood HCO3


 


 


 25 mmol/L


(21-28) 





 


Arterial Blood Base Excess


 


 


 1 mmol/L


(-3-3) 





 


FiO2   60  








Micro





Microbiology


10/15/20 Blood Culture - Preliminary, Resulted


           NO GROWTH AFTER 1 DAY





Objective


Assessment


1.  Acute hypoxic respiratory failure.


2.  COVID-19 Pneumonia


3.  Migraine headaches.


4.  Diabetes.


5.  Obesity.


6.  Fibromyalgia.


7.  Abnormal LFTs.





Plan


Plan of Care


cont steroids


cont Remdesivir


supportive care


HEDY Calzada MD               Oct 18, 2020 10:33

## 2020-10-18 NOTE — PDOC
PULMONARY PROGRESS NOTES


DATE: 10/18/20 


TIME: 10:49


Subjective


Patient was intubated on 10/12/20 for increased resp. distress, sedated on 

versed, fentanyl.. mod ett secretion peep 8-fi02 60% 


remains intubated/ sedated 


fever of 100.9 overnight 


No overnight concerns from nursing


Vitals





Vital Signs








  Date Time  Temp Pulse Resp B/P (MAP) Pulse Ox O2 Delivery O2 Flow Rate FiO2


 


10/18/20 09:59  65  153/91    


 


10/18/20 08:25     99 Ventilator  


 


10/18/20 06:00   20     


 


10/18/20 05:00 98.9       





 98.9       


 


10/17/20 07:17       15.0 








Comments


Pt. is seen during covid 19 pandemic, visual exam preformed 


on vent sedated


NC AT


no accessory muscle use


RRR


no edema or rash noted


Labs





Laboratory Tests








Test


 10/16/20


13:03 10/16/20


16:59 10/17/20


00:22 10/17/20


05:21


 


Glucose (Fingerstick)


 327 mg/dL


(70-99) 260 mg/dL


(70-99) 248 mg/dL


(70-99) 





 


White Blood Count


 


 


 


 10.3 x10^3/uL


(4.0-11.0)


 


Red Blood Count


 


 


 


 4.03 x10^6/uL


(3.50-5.40)


 


Hemoglobin


 


 


 


 11.7 g/dL


(12.0-15.5)


 


Hematocrit


 


 


 


 36.4 %


(36.0-47.0)


 


Mean Corpuscular Volume    90 fL () 


 


Mean Corpuscular Hemoglobin    29 pg (25-35) 


 


Mean Corpuscular Hemoglobin


Concent 


 


 


 32 g/dL


(31-37)


 


Red Cell Distribution Width


 


 


 


 14.2 %


(11.5-14.5)


 


Platelet Count


 


 


 


 243 x10^3/uL


(140-400)


 


Sodium Level


 


 


 


 139 mmol/L


(136-145)


 


Potassium Level


 


 


 


 5.3 mmol/L


(3.5-5.1)


 


Chloride Level


 


 


 


 103 mmol/L


()


 


Carbon Dioxide Level


 


 


 


 30 mmol/L


(21-32)


 


Anion Gap    6 (6-14) 


 


Blood Urea Nitrogen


 


 


 


 22 mg/dL


(7-20)


 


Creatinine


 


 


 


 0.7 mg/dL


(0.6-1.0)


 


Estimated GFR


(Cockcroft-Gault) 


 


 


 90.9 





 


BUN/Creatinine Ratio    31 (6-20) 


 


Glucose Level


 


 


 


 317 mg/dL


(70-99)


 


Calcium Level


 


 


 


 7.6 mg/dL


(8.5-10.1)


 


Total Bilirubin


 


 


 


 0.3 mg/dL


(0.2-1.0)


 


Aspartate Amino Transf


(AST/SGOT) 


 


 


 117 U/L


(15-37)


 


Alanine Aminotransferase


(ALT/SGPT) 


 


 


 301 U/L


(14-59)


 


Alkaline Phosphatase


 


 


 


 162 U/L


()


 


Total Protein


 


 


 


 4.7 g/dL


(6.4-8.2)


 


Albumin


 


 


 


 1.8 g/dL


(3.4-5.0)


 


Albumin/Globulin Ratio    0.6 (1.0-1.7) 


 


Test


 10/17/20


08:20 10/17/20


13:14 10/17/20


18:47 10/17/20


19:45


 


O2 Saturation 95 % (92-99)    


 


Arterial Blood pH


 7.46


(7.35-7.45) 


 


 





 


Arterial Blood pCO2 at


Patient Temp 39 mmHg


(35-46) 


 


 





 


Arterial Blood pO2 at Patient


Temp 78 mmHg


() 


 


 





 


Arterial Blood HCO3


 27 mmol/L


(21-28) 


 


 





 


Arterial Blood Base Excess


 3 mmol/L


(-3-3) 


 


 





 


FiO2 60% vent    


 


Glucose (Fingerstick)


 


 248 mg/dL


(70-99) 249 mg/dL


(70-99) 





 


Vancomycin Level Trough


 


 


 


 6.3 mcg/mL


(10.0-20.0)


 


Vancomycin Last Dose Date     


 


Vancomycin Last Dose Time     


 


Test


 10/18/20


00:00 10/18/20


05:10 10/18/20


05:20 10/18/20


08:26


 


Glucose (Fingerstick)


 289 mg/dL


(70-99) 284 mg/dL


(70-99) 


 





 


White Blood Count


 


 


 10.0 x10^3/uL


(4.0-11.0) 





 


Red Blood Count


 


 


 3.89 x10^6/uL


(3.50-5.40) 





 


Hemoglobin


 


 


 11.4 g/dL


(12.0-15.5) 





 


Hematocrit


 


 


 35.2 %


(36.0-47.0) 





 


Mean Corpuscular Volume   91 fL ()  


 


Mean Corpuscular Hemoglobin   29 pg (25-35)  


 


Mean Corpuscular Hemoglobin


Concent 


 


 32 g/dL


(31-37) 





 


Red Cell Distribution Width


 


 


 13.8 %


(11.5-14.5) 





 


Platelet Count


 


 


 215 x10^3/uL


(140-400) 





 


Sodium Level


 


 


 139 mmol/L


(136-145) 





 


Potassium Level


 


 


 4.6 mmol/L


(3.5-5.1) 





 


Chloride Level


 


 


 103 mmol/L


() 





 


Carbon Dioxide Level


 


 


 30 mmol/L


(21-32) 





 


Anion Gap   6 (6-14)  


 


Blood Urea Nitrogen


 


 


 23 mg/dL


(7-20) 





 


Creatinine


 


 


 0.6 mg/dL


(0.6-1.0) 





 


Estimated GFR


(Cockcroft-Gault) 


 


 108.6 


 





 


BUN/Creatinine Ratio   38 (6-20)  


 


Glucose Level


 


 


 306 mg/dL


(70-99) 





 


Calcium Level


 


 


 7.9 mg/dL


(8.5-10.1) 





 


Total Bilirubin


 


 


 0.3 mg/dL


(0.2-1.0) 





 


Aspartate Amino Transf


(AST/SGOT) 


 


 67 U/L (15-37) 


 





 


Alanine Aminotransferase


(ALT/SGPT) 


 


 191 U/L


(14-59) 





 


Alkaline Phosphatase


 


 


 116 U/L


() 





 


Total Protein


 


 


 4.8 g/dL


(6.4-8.2) 





 


Albumin


 


 


 1.7 g/dL


(3.4-5.0) 





 


Albumin/Globulin Ratio   0.5 (1.0-1.7)  


 


O2 Saturation    95 % (92-99) 


 


Arterial Blood pH


 


 


 


 7.44


(7.35-7.45)


 


Arterial Blood pCO2 at


Patient Temp 


 


 


 37 mmHg


(35-46)


 


Arterial Blood pO2 at Patient


Temp 


 


 


 77 mmHg


()


 


Arterial Blood HCO3


 


 


 


 25 mmol/L


(21-28)


 


Arterial Blood Base Excess


 


 


 


 1 mmol/L


(-3-3)


 


FiO2    60 








Laboratory Tests








Test


 10/17/20


13:14 10/17/20


18:47 10/17/20


19:45 10/18/20


00:00


 


Glucose (Fingerstick)


 248 mg/dL


(70-99) 249 mg/dL


(70-99) 


 289 mg/dL


(70-99)


 


Vancomycin Level Trough


 


 


 6.3 mcg/mL


(10.0-20.0) 





 


Vancomycin Last Dose Date     


 


Vancomycin Last Dose Time     


 


Test


 10/18/20


05:10 10/18/20


05:20 10/18/20


08:26 





 


Glucose (Fingerstick)


 284 mg/dL


(70-99) 


 


 





 


White Blood Count


 


 10.0 x10^3/uL


(4.0-11.0) 


 





 


Red Blood Count


 


 3.89 x10^6/uL


(3.50-5.40) 


 





 


Hemoglobin


 


 11.4 g/dL


(12.0-15.5) 


 





 


Hematocrit


 


 35.2 %


(36.0-47.0) 


 





 


Mean Corpuscular Volume  91 fL ()   


 


Mean Corpuscular Hemoglobin  29 pg (25-35)   


 


Mean Corpuscular Hemoglobin


Concent 


 32 g/dL


(31-37) 


 





 


Red Cell Distribution Width


 


 13.8 %


(11.5-14.5) 


 





 


Platelet Count


 


 215 x10^3/uL


(140-400) 


 





 


Sodium Level


 


 139 mmol/L


(136-145) 


 





 


Potassium Level


 


 4.6 mmol/L


(3.5-5.1) 


 





 


Chloride Level


 


 103 mmol/L


() 


 





 


Carbon Dioxide Level


 


 30 mmol/L


(21-32) 


 





 


Anion Gap  6 (6-14)   


 


Blood Urea Nitrogen


 


 23 mg/dL


(7-20) 


 





 


Creatinine


 


 0.6 mg/dL


(0.6-1.0) 


 





 


Estimated GFR


(Cockcroft-Gault) 


 108.6 


 


 





 


BUN/Creatinine Ratio  38 (6-20)   


 


Glucose Level


 


 306 mg/dL


(70-99) 


 





 


Calcium Level


 


 7.9 mg/dL


(8.5-10.1) 


 





 


Total Bilirubin


 


 0.3 mg/dL


(0.2-1.0) 


 





 


Aspartate Amino Transf


(AST/SGOT) 


 67 U/L (15-37) 


 


 





 


Alanine Aminotransferase


(ALT/SGPT) 


 191 U/L


(14-59) 


 





 


Alkaline Phosphatase


 


 116 U/L


() 


 





 


Total Protein


 


 4.8 g/dL


(6.4-8.2) 


 





 


Albumin


 


 1.7 g/dL


(3.4-5.0) 


 





 


Albumin/Globulin Ratio  0.5 (1.0-1.7)   


 


O2 Saturation   95 % (92-99)  


 


Arterial Blood pH


 


 


 7.44


(7.35-7.45) 





 


Arterial Blood pCO2 at


Patient Temp 


 


 37 mmHg


(35-46) 





 


Arterial Blood pO2 at Patient


Temp 


 


 77 mmHg


() 





 


Arterial Blood HCO3


 


 


 25 mmol/L


(21-28) 





 


Arterial Blood Base Excess


 


 


 1 mmol/L


(-3-3) 





 


FiO2   60  








Medications





Active Scripts








 Medications  Dose


 Route/Sig


 Max Daily Dose Days Date Category


 


 Cymbalta


  (Duloxetine Hcl)


 20 Mg Capsule.dr  20 Mg


 PO DAILY


   9/22/20 Reported


 


 Lisinopril 5 Mg


 Tablet  5 Mg


 PO DAILY


   9/22/20 Reported


 


 Amitriptyline Hcl


 10 Mg Tablet  10 Mg


 PO DAILY


   9/22/20 Reported


 


 Trulicity


  (Dulaglutide) 1.5


 Mg/0.5 Ml


 Pen.injctr  1.5 Mg


 SQ


   9/22/20 Reported


 


 Nesina


  (Alogliptin


 Benzoate) 12.5 Mg


 Tablet  12.5 Mg


 PO


   9/22/20 Reported


 


 Propranolol Hcl


 80 Mg Tablet  80 Mg


 PO DAILY


   5/14/14 Reported


 


 Cymbalta


  (Duloxetine Hcl)


 60 Mg Capsule.dr  60 Mg


 PO DAILY


   5/14/14 Reported








Comments


CXR 10/17 reviewed





1. ET tube terminates 3.7 cm above the hiren.


2. Lung volumes are lower and show increasing consolidation at the left 


lung base.-





Impression


.


IMPRESSION:


1.  Acute hypoxemic respiratory failure.-- S/P intubation on 10/12/20 


2.  COVID-19 viral pneumonia.


3.  Abnormal chest x-ray, possible gram-positive, gram-negative pneumonia.


4.  Headaches.


5.  Type 2 diabetes.


6.  Fibromyalgia.


7.  Morbid obesity.


8.  Nonalcoholic steatohepatitis.


9.  Obstructive sleep apnea.


10. Abnormal D-Dimer due to COVID, improving 10/15





Plan


.


Continue current vent support. 02 titration as tolerated, change to peep 8, fio2

50%


cxr abg reviewed 


Tylenol for fever and trend 


S/P plasma, cont. full course of remdesivir 


Cont. steroids 


ABX per ID: [email or web address] 


Follow clinical course 


DVT/GI PPX; protonix and BID lovenox 





D/W RN and RT 


critically ill


cc time 30 min no overlap











MIHIR BROWER MD               Oct 18, 2020 10:51

## 2020-10-18 NOTE — PDOC
PULMONARY PROGRESS NOTES


DATE: 10/18/20 


TIME: 09:48


Subjective


Patient was intubated on 10/12/20 for increased resp. distress, sedated on 

versed, fentanyl.. mod ett secretion peep 8-fi01 60% 


remains intubated/ sedated 


fever of 101.7 overnight 


No overnight concerns from nursing


Vitals





Vital Signs








  Date Time  Temp Pulse Resp B/P (MAP) Pulse Ox O2 Delivery O2 Flow Rate FiO2


 


10/18/20 08:25     99 Ventilator  


 


10/18/20 06:00  72 20 163/89 (113)    


 


10/18/20 05:00 98.9       





 98.9       


 


10/17/20 07:17       15.0 








Comments


Pt. is seen during covid 19 pandemic, visual exam preformed 


on vent sedated


NC AT


no accessory muscle use


RRR


no edema or rash noted


Labs





Laboratory Tests








Test


 10/16/20


13:03 10/16/20


16:59 10/17/20


00:22 10/17/20


05:21


 


Glucose (Fingerstick)


 327 mg/dL


(70-99) 260 mg/dL


(70-99) 248 mg/dL


(70-99) 





 


White Blood Count


 


 


 


 10.3 x10^3/uL


(4.0-11.0)


 


Red Blood Count


 


 


 


 4.03 x10^6/uL


(3.50-5.40)


 


Hemoglobin


 


 


 


 11.7 g/dL


(12.0-15.5)


 


Hematocrit


 


 


 


 36.4 %


(36.0-47.0)


 


Mean Corpuscular Volume    90 fL () 


 


Mean Corpuscular Hemoglobin    29 pg (25-35) 


 


Mean Corpuscular Hemoglobin


Concent 


 


 


 32 g/dL


(31-37)


 


Red Cell Distribution Width


 


 


 


 14.2 %


(11.5-14.5)


 


Platelet Count


 


 


 


 243 x10^3/uL


(140-400)


 


Sodium Level


 


 


 


 139 mmol/L


(136-145)


 


Potassium Level


 


 


 


 5.3 mmol/L


(3.5-5.1)


 


Chloride Level


 


 


 


 103 mmol/L


()


 


Carbon Dioxide Level


 


 


 


 30 mmol/L


(21-32)


 


Anion Gap    6 (6-14) 


 


Blood Urea Nitrogen


 


 


 


 22 mg/dL


(7-20)


 


Creatinine


 


 


 


 0.7 mg/dL


(0.6-1.0)


 


Estimated GFR


(Cockcroft-Gault) 


 


 


 90.9 





 


BUN/Creatinine Ratio    31 (6-20) 


 


Glucose Level


 


 


 


 317 mg/dL


(70-99)


 


Calcium Level


 


 


 


 7.6 mg/dL


(8.5-10.1)


 


Total Bilirubin


 


 


 


 0.3 mg/dL


(0.2-1.0)


 


Aspartate Amino Transf


(AST/SGOT) 


 


 


 117 U/L


(15-37)


 


Alanine Aminotransferase


(ALT/SGPT) 


 


 


 301 U/L


(14-59)


 


Alkaline Phosphatase


 


 


 


 162 U/L


()


 


Total Protein


 


 


 


 4.7 g/dL


(6.4-8.2)


 


Albumin


 


 


 


 1.8 g/dL


(3.4-5.0)


 


Albumin/Globulin Ratio    0.6 (1.0-1.7) 


 


Test


 10/17/20


08:20 10/17/20


13:14 10/17/20


18:47 10/17/20


19:45


 


O2 Saturation 95 % (92-99)    


 


Arterial Blood pH


 7.46


(7.35-7.45) 


 


 





 


Arterial Blood pCO2 at


Patient Temp 39 mmHg


(35-46) 


 


 





 


Arterial Blood pO2 at Patient


Temp 78 mmHg


() 


 


 





 


Arterial Blood HCO3


 27 mmol/L


(21-28) 


 


 





 


Arterial Blood Base Excess


 3 mmol/L


(-3-3) 


 


 





 


FiO2 60% vent    


 


Glucose (Fingerstick)


 


 248 mg/dL


(70-99) 249 mg/dL


(70-99) 





 


Vancomycin Level Trough


 


 


 


 6.3 mcg/mL


(10.0-20.0)


 


Vancomycin Last Dose Date     


 


Vancomycin Last Dose Time     


 


Test


 10/18/20


00:00 10/18/20


05:10 10/18/20


05:20 10/18/20


08:26


 


Glucose (Fingerstick)


 289 mg/dL


(70-99) 284 mg/dL


(70-99) 


 





 


White Blood Count


 


 


 10.0 x10^3/uL


(4.0-11.0) 





 


Red Blood Count


 


 


 3.89 x10^6/uL


(3.50-5.40) 





 


Hemoglobin


 


 


 11.4 g/dL


(12.0-15.5) 





 


Hematocrit


 


 


 35.2 %


(36.0-47.0) 





 


Mean Corpuscular Volume   91 fL ()  


 


Mean Corpuscular Hemoglobin   29 pg (25-35)  


 


Mean Corpuscular Hemoglobin


Concent 


 


 32 g/dL


(31-37) 





 


Red Cell Distribution Width


 


 


 13.8 %


(11.5-14.5) 





 


Platelet Count


 


 


 215 x10^3/uL


(140-400) 





 


Sodium Level


 


 


 139 mmol/L


(136-145) 





 


Potassium Level


 


 


 4.6 mmol/L


(3.5-5.1) 





 


Chloride Level


 


 


 103 mmol/L


() 





 


Carbon Dioxide Level


 


 


 30 mmol/L


(21-32) 





 


Anion Gap   6 (6-14)  


 


Blood Urea Nitrogen


 


 


 23 mg/dL


(7-20) 





 


Creatinine


 


 


 0.6 mg/dL


(0.6-1.0) 





 


Estimated GFR


(Cockcroft-Gault) 


 


 108.6 


 





 


BUN/Creatinine Ratio   38 (6-20)  


 


Glucose Level


 


 


 306 mg/dL


(70-99) 





 


Calcium Level


 


 


 7.9 mg/dL


(8.5-10.1) 





 


Total Bilirubin


 


 


 0.3 mg/dL


(0.2-1.0) 





 


Aspartate Amino Transf


(AST/SGOT) 


 


 67 U/L (15-37) 


 





 


Alanine Aminotransferase


(ALT/SGPT) 


 


 191 U/L


(14-59) 





 


Alkaline Phosphatase


 


 


 116 U/L


() 





 


Total Protein


 


 


 4.8 g/dL


(6.4-8.2) 





 


Albumin


 


 


 1.7 g/dL


(3.4-5.0) 





 


Albumin/Globulin Ratio   0.5 (1.0-1.7)  


 


O2 Saturation    95 % (92-99) 


 


Arterial Blood pH


 


 


 


 7.44


(7.35-7.45)


 


Arterial Blood pCO2 at


Patient Temp 


 


 


 37 mmHg


(35-46)


 


Arterial Blood pO2 at Patient


Temp 


 


 


 77 mmHg


()


 


Arterial Blood HCO3


 


 


 


 25 mmol/L


(21-28)


 


Arterial Blood Base Excess


 


 


 


 1 mmol/L


(-3-3)


 


FiO2    60 








Laboratory Tests








Test


 10/17/20


13:14 10/17/20


18:47 10/17/20


19:45 10/18/20


00:00


 


Glucose (Fingerstick)


 248 mg/dL


(70-99) 249 mg/dL


(70-99) 


 289 mg/dL


(70-99)


 


Vancomycin Level Trough


 


 


 6.3 mcg/mL


(10.0-20.0) 





 


Vancomycin Last Dose Date     


 


Vancomycin Last Dose Time     


 


Test


 10/18/20


05:10 10/18/20


05:20 10/18/20


08:26 





 


Glucose (Fingerstick)


 284 mg/dL


(70-99) 


 


 





 


White Blood Count


 


 10.0 x10^3/uL


(4.0-11.0) 


 





 


Red Blood Count


 


 3.89 x10^6/uL


(3.50-5.40) 


 





 


Hemoglobin


 


 11.4 g/dL


(12.0-15.5) 


 





 


Hematocrit


 


 35.2 %


(36.0-47.0) 


 





 


Mean Corpuscular Volume  91 fL ()   


 


Mean Corpuscular Hemoglobin  29 pg (25-35)   


 


Mean Corpuscular Hemoglobin


Concent 


 32 g/dL


(31-37) 


 





 


Red Cell Distribution Width


 


 13.8 %


(11.5-14.5) 


 





 


Platelet Count


 


 215 x10^3/uL


(140-400) 


 





 


Sodium Level


 


 139 mmol/L


(136-145) 


 





 


Potassium Level


 


 4.6 mmol/L


(3.5-5.1) 


 





 


Chloride Level


 


 103 mmol/L


() 


 





 


Carbon Dioxide Level


 


 30 mmol/L


(21-32) 


 





 


Anion Gap  6 (6-14)   


 


Blood Urea Nitrogen


 


 23 mg/dL


(7-20) 


 





 


Creatinine


 


 0.6 mg/dL


(0.6-1.0) 


 





 


Estimated GFR


(Cockcroft-Gault) 


 108.6 


 


 





 


BUN/Creatinine Ratio  38 (6-20)   


 


Glucose Level


 


 306 mg/dL


(70-99) 


 





 


Calcium Level


 


 7.9 mg/dL


(8.5-10.1) 


 





 


Total Bilirubin


 


 0.3 mg/dL


(0.2-1.0) 


 





 


Aspartate Amino Transf


(AST/SGOT) 


 67 U/L (15-37) 


 


 





 


Alanine Aminotransferase


(ALT/SGPT) 


 191 U/L


(14-59) 


 





 


Alkaline Phosphatase


 


 116 U/L


() 


 





 


Total Protein


 


 4.8 g/dL


(6.4-8.2) 


 





 


Albumin


 


 1.7 g/dL


(3.4-5.0) 


 





 


Albumin/Globulin Ratio  0.5 (1.0-1.7)   


 


O2 Saturation   95 % (92-99)  


 


Arterial Blood pH


 


 


 7.44


(7.35-7.45) 





 


Arterial Blood pCO2 at


Patient Temp 


 


 37 mmHg


(35-46) 





 


Arterial Blood pO2 at Patient


Temp 


 


 77 mmHg


() 





 


Arterial Blood HCO3


 


 


 25 mmol/L


(21-28) 





 


Arterial Blood Base Excess


 


 


 1 mmol/L


(-3-3) 





 


FiO2   60  








Medications





Active Scripts








 Medications  Dose


 Route/Sig


 Max Daily Dose Days Date Category


 


 Cymbalta


  (Duloxetine Hcl)


 20 Mg Capsule.dr  20 Mg


 PO DAILY


   9/22/20 Reported


 


 Lisinopril 5 Mg


 Tablet  5 Mg


 PO DAILY


   9/22/20 Reported


 


 Amitriptyline Hcl


 10 Mg Tablet  10 Mg


 PO DAILY


   9/22/20 Reported


 


 Trulicity


  (Dulaglutide) 1.5


 Mg/0.5 Ml


 Pen.injctr  1.5 Mg


 SQ


   9/22/20 Reported


 


 Nesina


  (Alogliptin


 Benzoate) 12.5 Mg


 Tablet  12.5 Mg


 PO


   9/22/20 Reported


 


 Propranolol Hcl


 80 Mg Tablet  80 Mg


 PO DAILY


   5/14/14 Reported


 


 Cymbalta


  (Duloxetine Hcl)


 60 Mg Capsule.dr  60 Mg


 PO DAILY


   5/14/14 Reported








Comments


CXR 10/17 reviewed





1. ET tube terminates 3.7 cm above the hiren.


2. Lung volumes are lower and show increasing consolidation at the left 


lung base.-





Impression


.


IMPRESSION:


1.  Acute hypoxemic respiratory failure.-- S/P intubation on 10/12/20 


2.  COVID-19 viral pneumonia.


3.  Abnormal chest x-ray, possible gram-positive, gram-negative pneumonia.


4.  Headaches.


5.  Type 2 diabetes.


6.  Fibromyalgia.


7.  Morbid obesity.


8.  Nonalcoholic steatohepatitis.


9.  Obstructive sleep apnea.


10. Abnormal D-Dimer due to COVID, improving 10/15





Plan


.


Continue current vent support. 02 titration as tolerated


cxr abg reviewed 


Tylenol for fever and trend 


S/P plasma, cont. full course of remdesivir 


Cont. steroids 


ABX per ID: [email or web address] 


Follow clinical course 


DVT/GI PPX; protonix and BID lovenox 





D/W RN and RT 


critically ill


cc time 30 min no overlap











MIHIR BROWER MD               Oct 18, 2020 09:48

## 2020-10-19 VITALS — DIASTOLIC BLOOD PRESSURE: 60 MMHG | SYSTOLIC BLOOD PRESSURE: 114 MMHG

## 2020-10-19 VITALS — DIASTOLIC BLOOD PRESSURE: 68 MMHG | SYSTOLIC BLOOD PRESSURE: 126 MMHG

## 2020-10-19 VITALS — SYSTOLIC BLOOD PRESSURE: 148 MMHG | DIASTOLIC BLOOD PRESSURE: 83 MMHG

## 2020-10-19 VITALS — SYSTOLIC BLOOD PRESSURE: 114 MMHG | DIASTOLIC BLOOD PRESSURE: 58 MMHG

## 2020-10-19 VITALS — DIASTOLIC BLOOD PRESSURE: 76 MMHG | SYSTOLIC BLOOD PRESSURE: 136 MMHG

## 2020-10-19 VITALS — SYSTOLIC BLOOD PRESSURE: 136 MMHG | DIASTOLIC BLOOD PRESSURE: 73 MMHG

## 2020-10-19 VITALS — SYSTOLIC BLOOD PRESSURE: 124 MMHG | DIASTOLIC BLOOD PRESSURE: 67 MMHG

## 2020-10-19 VITALS — DIASTOLIC BLOOD PRESSURE: 63 MMHG | SYSTOLIC BLOOD PRESSURE: 96 MMHG

## 2020-10-19 VITALS — DIASTOLIC BLOOD PRESSURE: 64 MMHG | SYSTOLIC BLOOD PRESSURE: 118 MMHG

## 2020-10-19 VITALS — SYSTOLIC BLOOD PRESSURE: 108 MMHG | DIASTOLIC BLOOD PRESSURE: 94 MMHG

## 2020-10-19 VITALS — SYSTOLIC BLOOD PRESSURE: 145 MMHG | DIASTOLIC BLOOD PRESSURE: 78 MMHG

## 2020-10-19 VITALS — DIASTOLIC BLOOD PRESSURE: 65 MMHG | SYSTOLIC BLOOD PRESSURE: 120 MMHG

## 2020-10-19 VITALS — SYSTOLIC BLOOD PRESSURE: 122 MMHG | DIASTOLIC BLOOD PRESSURE: 68 MMHG

## 2020-10-19 VITALS — DIASTOLIC BLOOD PRESSURE: 74 MMHG | SYSTOLIC BLOOD PRESSURE: 132 MMHG

## 2020-10-19 VITALS — SYSTOLIC BLOOD PRESSURE: 110 MMHG | DIASTOLIC BLOOD PRESSURE: 60 MMHG

## 2020-10-19 VITALS — SYSTOLIC BLOOD PRESSURE: 129 MMHG | DIASTOLIC BLOOD PRESSURE: 71 MMHG

## 2020-10-19 VITALS — SYSTOLIC BLOOD PRESSURE: 125 MMHG | DIASTOLIC BLOOD PRESSURE: 65 MMHG

## 2020-10-19 VITALS — SYSTOLIC BLOOD PRESSURE: 123 MMHG | DIASTOLIC BLOOD PRESSURE: 70 MMHG

## 2020-10-19 VITALS — DIASTOLIC BLOOD PRESSURE: 66 MMHG | SYSTOLIC BLOOD PRESSURE: 126 MMHG

## 2020-10-19 VITALS — SYSTOLIC BLOOD PRESSURE: 111 MMHG | DIASTOLIC BLOOD PRESSURE: 57 MMHG

## 2020-10-19 VITALS — SYSTOLIC BLOOD PRESSURE: 133 MMHG | DIASTOLIC BLOOD PRESSURE: 74 MMHG

## 2020-10-19 VITALS — DIASTOLIC BLOOD PRESSURE: 79 MMHG | SYSTOLIC BLOOD PRESSURE: 141 MMHG

## 2020-10-19 VITALS — DIASTOLIC BLOOD PRESSURE: 62 MMHG | SYSTOLIC BLOOD PRESSURE: 115 MMHG

## 2020-10-19 VITALS — SYSTOLIC BLOOD PRESSURE: 112 MMHG | DIASTOLIC BLOOD PRESSURE: 76 MMHG

## 2020-10-19 LAB
BASE EXCESS ABG: 1 MMOL/L (ref -3–3)
HCO3 BLDA-SCNC: 25 MMOL/L (ref 21–28)
INSPIRATION SETTING TIME VENT: 50
PCO2 BLDA: 38 MMHG (ref 35–46)
PO2 BLDA: 73 MMHG (ref 75–108)
SAO2 % BLDA: 94 % (ref 92–99)

## 2020-10-19 RX ADMIN — INSULIN LISPRO SCH UNITS: 100 INJECTION, SOLUTION INTRAVENOUS; SUBCUTANEOUS at 11:49

## 2020-10-19 RX ADMIN — INSULIN LISPRO SCH UNITS: 100 INJECTION, SOLUTION INTRAVENOUS; SUBCUTANEOUS at 01:39

## 2020-10-19 RX ADMIN — MIDAZOLAM PRN MLS/HR: 5 INJECTION, SOLUTION INTRAMUSCULAR; INTRAVENOUS at 15:37

## 2020-10-19 RX ADMIN — PIPERACILLIN SODIUM AND TAZOBACTAM SODIUM SCH MLS/HR: 3; .375 INJECTION, POWDER, LYOPHILIZED, FOR SOLUTION INTRAVENOUS at 23:54

## 2020-10-19 RX ADMIN — LINAGLIPTIN SCH MG: 5 TABLET, FILM COATED ORAL at 08:00

## 2020-10-19 RX ADMIN — INSULIN LISPRO SCH UNITS: 100 INJECTION, SOLUTION INTRAVENOUS; SUBCUTANEOUS at 11:48

## 2020-10-19 RX ADMIN — PANTOPRAZOLE SODIUM SCH MG: 40 INJECTION, POWDER, FOR SOLUTION INTRAVENOUS at 07:59

## 2020-10-19 RX ADMIN — METHYLPREDNISOLONE SODIUM SUCCINATE SCH MG: 40 INJECTION, POWDER, FOR SOLUTION INTRAMUSCULAR; INTRAVENOUS at 14:29

## 2020-10-19 RX ADMIN — VANCOMYCIN HYDROCHLORIDE SCH MLS/HR: 1 INJECTION, POWDER, FOR SOLUTION INTRAVENOUS at 06:29

## 2020-10-19 RX ADMIN — MIDAZOLAM PRN MLS/HR: 5 INJECTION, SOLUTION INTRAMUSCULAR; INTRAVENOUS at 08:00

## 2020-10-19 RX ADMIN — INSULIN GLARGINE SCH UNIT: 100 INJECTION, SOLUTION SUBCUTANEOUS at 09:40

## 2020-10-19 RX ADMIN — INSULIN LISPRO SCH UNITS: 100 INJECTION, SOLUTION INTRAVENOUS; SUBCUTANEOUS at 01:40

## 2020-10-19 RX ADMIN — PIPERACILLIN SODIUM AND TAZOBACTAM SODIUM SCH MLS/HR: 3; .375 INJECTION, POWDER, LYOPHILIZED, FOR SOLUTION INTRAVENOUS at 02:06

## 2020-10-19 RX ADMIN — METHYLPREDNISOLONE SODIUM SUCCINATE SCH MG: 40 INJECTION, POWDER, FOR SOLUTION INTRAMUSCULAR; INTRAVENOUS at 06:00

## 2020-10-19 RX ADMIN — METHYLPREDNISOLONE SODIUM SUCCINATE SCH MG: 40 INJECTION, POWDER, FOR SOLUTION INTRAMUSCULAR; INTRAVENOUS at 20:16

## 2020-10-19 RX ADMIN — INSULIN LISPRO SCH UNITS: 100 INJECTION, SOLUTION INTRAVENOUS; SUBCUTANEOUS at 17:28

## 2020-10-19 RX ADMIN — INSULIN LISPRO SCH UNITS: 100 INJECTION, SOLUTION INTRAVENOUS; SUBCUTANEOUS at 06:31

## 2020-10-19 RX ADMIN — LISINOPRIL SCH MG: 5 TABLET ORAL at 07:59

## 2020-10-19 RX ADMIN — INSULIN GLARGINE SCH UNIT: 100 INJECTION, SOLUTION SUBCUTANEOUS at 20:19

## 2020-10-19 RX ADMIN — ENOXAPARIN SODIUM SCH MG: 40 INJECTION SUBCUTANEOUS at 08:00

## 2020-10-19 RX ADMIN — ENOXAPARIN SODIUM SCH MG: 40 INJECTION SUBCUTANEOUS at 20:18

## 2020-10-19 RX ADMIN — INSULIN LISPRO SCH UNITS: 100 INJECTION, SOLUTION INTRAVENOUS; SUBCUTANEOUS at 23:54

## 2020-10-19 RX ADMIN — PIPERACILLIN SODIUM AND TAZOBACTAM SODIUM SCH MLS/HR: 3; .375 INJECTION, POWDER, LYOPHILIZED, FOR SOLUTION INTRAVENOUS at 05:56

## 2020-10-19 RX ADMIN — INSULIN LISPRO SCH UNITS: 100 INJECTION, SOLUTION INTRAVENOUS; SUBCUTANEOUS at 06:32

## 2020-10-19 RX ADMIN — Medication PRN MLS/HR: at 14:29

## 2020-10-19 RX ADMIN — PIPERACILLIN SODIUM AND TAZOBACTAM SODIUM SCH MLS/HR: 3; .375 INJECTION, POWDER, LYOPHILIZED, FOR SOLUTION INTRAVENOUS at 17:27

## 2020-10-19 RX ADMIN — PIPERACILLIN SODIUM AND TAZOBACTAM SODIUM SCH MLS/HR: 3; .375 INJECTION, POWDER, LYOPHILIZED, FOR SOLUTION INTRAVENOUS at 11:48

## 2020-10-19 NOTE — PDOC
Infectious Disease Note


Subjective


Subjective


intubated on vent





ROS


ROS


No nausea vomiting diarrhea





Vital Sign


Vital Signs





Vital Signs








  Date Time  Temp Pulse Resp B/P (MAP) Pulse Ox O2 Delivery O2 Flow Rate FiO2


 


10/19/20 07:00  72 20 136/76 (96) 95 Ventilator  


 


10/19/20 04:00 98.6       





 98.6       


 


10/18/20 13:55       50.0 











Physical Exam


PHYSICAL EXAM


GENERAL:  intubated on vent


HEENT:  Normocephalic, atraumatic.  Anicteric.


NECK:  Supple.  No thyromegaly.


LUNGS:  Coarse breath sounds.  No wheezing.


HEART:  S1, S2, no murmurs.


ABDOMEN:  Obese, soft, nontender, bowel sounds present.


EXTREMITIES:  No edema, no cyanosis.


DERMATOLOGIC:  Warm, dry.  No generalized rash.  Multiple tattoos.


CENTRAL NERVOUS SYSTEM:  sedated intubated





Labs


Lab





Laboratory Tests








Test


 10/18/20


08:26 10/18/20


11:05 10/18/20


17:21 10/18/20


21:10


 


O2 Saturation 95 % (92-99)    


 


Arterial Blood pH


 7.44


(7.35-7.45) 


 


 





 


Arterial Blood pCO2 at


Patient Temp 37 mmHg


(35-46) 


 


 





 


Arterial Blood pO2 at Patient


Temp 77 mmHg


() 


 


 





 


Arterial Blood HCO3


 25 mmol/L


(21-28) 


 


 





 


Arterial Blood Base Excess


 1 mmol/L


(-3-3) 


 


 





 


FiO2 60    


 


Glucose (Fingerstick)


 


 296 mg/dL


(70-99) 230 mg/dL


(70-99) 





 


Vancomycin Level Trough


 


 


 


 13.8 mcg/mL


(10.0-20.0)


 


Vancomycin Last Dose Date    10/18/2020 


 


Vancomycin Last Dose Time    0530 


 


Test


 10/18/20


21:16 10/19/20


01:16 10/19/20


06:26 





 


Glucose (Fingerstick)


 222 mg/dL


(70-99) 253 mg/dL


(70-99) 281 mg/dL


(70-99) 











Micro





Microbiology


10/15/20 Blood Culture - Preliminary, Resulted


           NO GROWTH AFTER 1 DAY





Objective


Assessment


1.  Acute hypoxic respiratory failure.


2.  COVID-19 Pneumonia


3.  Migraine headaches.


4.  Diabetes.


5.  Obesity.


6.  Fibromyalgia.


7.  Abnormal LFTs.





Plan


Plan of Care


cont steroids


cont Remdesivir


supportive care


 HEDY Rooney MD               Oct 19, 2020 07:51

## 2020-10-19 NOTE — NUR
SS following up with discharge planning. SS reviewed pt chart and discussed with pt RN. Pt 
remains on the vent at this time at 50%. Pt on IV Zosyn. COVID19 positive. Not stable for 
discharge at this time. SS will continue to follow for discharge planning.

## 2020-10-19 NOTE — PDOC
PULMONARY PROGRESS NOTES


DATE: 10/19/20 


TIME: 12:52


Subjective


Patient was intubated on 10/12/20 


remains intubated/ sedated 


Afebrile overnight


No overnight concerns from nursing


Vitals





Vital Signs








  Date Time  Temp Pulse Resp B/P (MAP) Pulse Ox O2 Delivery O2 Flow Rate FiO2


 


10/19/20 12:00 98.8 64 20 132/74 (93) 93 Ventilator  





 98.8       


 


10/18/20 13:55       50.0 








Comments


Pt. is seen during covid 19 pandemic, visual exam preformed 


on vent sedated


no accessory muscle use


RRR


no edema or rash noted


Labs





Laboratory Tests








Test


 10/17/20


13:14 10/17/20


18:47 10/17/20


19:45 10/18/20


00:00


 


Glucose (Fingerstick)


 248 mg/dL


(70-99) 249 mg/dL


(70-99) 


 289 mg/dL


(70-99)


 


Vancomycin Level Trough


 


 


 6.3 mcg/mL


(10.0-20.0) 





 


Vancomycin Last Dose Date     


 


Vancomycin Last Dose Time     


 


Test


 10/18/20


05:10 10/18/20


05:20 10/18/20


08:26 10/18/20


11:05


 


Glucose (Fingerstick)


 284 mg/dL


(70-99) 


 


 296 mg/dL


(70-99)


 


White Blood Count


 


 10.0 x10^3/uL


(4.0-11.0) 


 





 


Red Blood Count


 


 3.89 x10^6/uL


(3.50-5.40) 


 





 


Hemoglobin


 


 11.4 g/dL


(12.0-15.5) 


 





 


Hematocrit


 


 35.2 %


(36.0-47.0) 


 





 


Mean Corpuscular Volume  91 fL ()   


 


Mean Corpuscular Hemoglobin  29 pg (25-35)   


 


Mean Corpuscular Hemoglobin


Concent 


 32 g/dL


(31-37) 


 





 


Red Cell Distribution Width


 


 13.8 %


(11.5-14.5) 


 





 


Platelet Count


 


 215 x10^3/uL


(140-400) 


 





 


Sodium Level


 


 139 mmol/L


(136-145) 


 





 


Potassium Level


 


 4.6 mmol/L


(3.5-5.1) 


 





 


Chloride Level


 


 103 mmol/L


() 


 





 


Carbon Dioxide Level


 


 30 mmol/L


(21-32) 


 





 


Anion Gap  6 (6-14)   


 


Blood Urea Nitrogen


 


 23 mg/dL


(7-20) 


 





 


Creatinine


 


 0.6 mg/dL


(0.6-1.0) 


 





 


Estimated GFR


(Cockcroft-Gault) 


 108.6 


 


 





 


BUN/Creatinine Ratio  38 (6-20)   


 


Glucose Level


 


 306 mg/dL


(70-99) 


 





 


Calcium Level


 


 7.9 mg/dL


(8.5-10.1) 


 





 


Total Bilirubin


 


 0.3 mg/dL


(0.2-1.0) 


 





 


Aspartate Amino Transf


(AST/SGOT) 


 67 U/L (15-37) 


 


 





 


Alanine Aminotransferase


(ALT/SGPT) 


 191 U/L


(14-59) 


 





 


Alkaline Phosphatase


 


 116 U/L


() 


 





 


Total Protein


 


 4.8 g/dL


(6.4-8.2) 


 





 


Albumin


 


 1.7 g/dL


(3.4-5.0) 


 





 


Albumin/Globulin Ratio  0.5 (1.0-1.7)   


 


O2 Saturation   95 % (92-99)  


 


Arterial Blood pH


 


 


 7.44


(7.35-7.45) 





 


Arterial Blood pCO2 at


Patient Temp 


 


 37 mmHg


(35-46) 





 


Arterial Blood pO2 at Patient


Temp 


 


 77 mmHg


() 





 


Arterial Blood HCO3


 


 


 25 mmol/L


(21-28) 





 


Arterial Blood Base Excess


 


 


 1 mmol/L


(-3-3) 





 


FiO2   60  


 


Test


 10/18/20


17:21 10/18/20


21:10 10/18/20


21:16 10/19/20


01:16


 


Glucose (Fingerstick)


 230 mg/dL


(70-99) 


 222 mg/dL


(70-99) 253 mg/dL


(70-99)


 


Vancomycin Level Trough


 


 13.8 mcg/mL


(10.0-20.0) 


 





 


Vancomycin Last Dose Date  10/18/2020   


 


Vancomycin Last Dose Time  0530   


 


Test


 10/19/20


06:26 10/19/20


09:05 10/19/20


11:19 





 


Glucose (Fingerstick)


 281 mg/dL


(70-99) 


 239 mg/dL


(70-99) 





 


O2 Saturation  94 % (92-99)   


 


Arterial Blood pH


 


 7.44


(7.35-7.45) 


 





 


Arterial Blood pCO2 at


Patient Temp 


 38 mmHg


(35-46) 


 





 


Arterial Blood pO2 at Patient


Temp 


 73 mmHg


() 


 





 


Arterial Blood HCO3


 


 25 mmol/L


(21-28) 


 





 


Arterial Blood Base Excess


 


 1 mmol/L


(-3-3) 


 





 


FiO2  50   








Laboratory Tests








Test


 10/18/20


17:21 10/18/20


21:10 10/18/20


21:16 10/19/20


01:16


 


Glucose (Fingerstick)


 230 mg/dL


(70-99) 


 222 mg/dL


(70-99) 253 mg/dL


(70-99)


 


Vancomycin Level Trough


 


 13.8 mcg/mL


(10.0-20.0) 


 





 


Vancomycin Last Dose Date  10/18/2020   


 


Vancomycin Last Dose Time  0530   


 


Test


 10/19/20


06:26 10/19/20


09:05 10/19/20


11:19 





 


Glucose (Fingerstick)


 281 mg/dL


(70-99) 


 239 mg/dL


(70-99) 





 


O2 Saturation  94 % (92-99)   


 


Arterial Blood pH


 


 7.44


(7.35-7.45) 


 





 


Arterial Blood pCO2 at


Patient Temp 


 38 mmHg


(35-46) 


 





 


Arterial Blood pO2 at Patient


Temp 


 73 mmHg


() 


 





 


Arterial Blood HCO3


 


 25 mmol/L


(21-28) 


 





 


Arterial Blood Base Excess


 


 1 mmol/L


(-3-3) 


 





 


FiO2  50   








Medications





Active Scripts








 Medications  Dose


 Route/Sig


 Max Daily Dose Days Date Category


 


 Cymbalta


  (Duloxetine Hcl)


 20 Mg Capsule.dr  20 Mg


 PO DAILY


   9/22/20 Reported


 


 Lisinopril 5 Mg


 Tablet  5 Mg


 PO DAILY


   9/22/20 Reported


 


 Amitriptyline Hcl


 10 Mg Tablet  10 Mg


 PO DAILY


   9/22/20 Reported


 


 Trulicity


  (Dulaglutide) 1.5


 Mg/0.5 Ml


 Pen.injctr  1.5 Mg


 SQ


   9/22/20 Reported


 


 Nesina


  (Alogliptin


 Benzoate) 12.5 Mg


 Tablet  12.5 Mg


 PO


   9/22/20 Reported


 


 Propranolol Hcl


 80 Mg Tablet  80 Mg


 PO DAILY


   5/14/14 Reported


 


 Cymbalta


  (Duloxetine Hcl)


 60 Mg Capsule.dr  60 Mg


 PO DAILY


   5/14/14 Reported








Comments


CXR 10/17 reviewed


1. ET tube terminates 3.7 cm above the hiren.


2. Lung volumes are lower and show increasing consolidation at the left 


lung base.-


 





CXR 10/19/20


IMPRESSION:


1. Endotracheal tube appears higher or more cephalad position compared to 


the prior study although positioning is somewhat different.


2. Persistent hazy infiltrates





Impression


.


IMPRESSION:


1.  Acute hypoxemic respiratory failure.-- S/P intubation on 10/12/20 

--improving 


2.  COVID-19 viral pneumonia.


3.  Abnormal chest x-ray, possible gram-positive, gram-negative pneumonia.


4.  Headaches.


5.  Type 2 diabetes.


6.  Fibromyalgia.


7.  Morbid obesity.


8.  Nonalcoholic steatohepatitis.


9.  Obstructive sleep apnea.


10. Abnormal D-Dimer due to COVID, improving 10/15





Plan


.


Continue current ventilatory support, patient is currently on 50% and a PEEP of 

8


Follow chest x-ray and ABG, ABG reviewed will reduce PEEP to 7 today, chest x-

ray today shows improvement of bilateral infiltrates


S/P plasma, cont. full course of remdesivir 


Cont. steroids, with taper  


ABX per ID: [email or web address] 


Continue tube feeding for nutritional support


Follow clinical course 


DVT/GI PPX; protonix and BID lovenox 





D/W RN and RT 


Critical care [id number], no overlap











LEWIS DICK MD              Oct 19, 2020 12:55

## 2020-10-19 NOTE — RAD
AP chest.

 

HISTORY: Covid pneumonia

 

AP view was taken of the chest. Endotracheal tube appears somewhat higher 

than the study from yesterday but positioning is different. NG tube 

extends into the stomach. Right central line extends to the left 

brachiocephalic vein. There are still hazy bilateral areas of infiltrate.

 

IMPRESSION:

1. Endotracheal tube appears higher or more cephalad position compared to 

the prior study although positioning is somewhat different.

2. Persistent hazy infiltrates.

 

Electronically signed by: Lloyd Desai MD (10/19/2020 6:29 AM) [id number]

## 2020-10-19 NOTE — PN
DATE:  10/19/2020



SUBJECTIVE:  The patient continued to be sedated, intubated and mechanically

ventilated.  She is now on FiO2 of 50%, maintaining her oxygen saturation at

94%.  The nursing staff did not voice any concern and state that the patient has

had an uneventful night.



PHYSICAL EXAMINATION:

GENERAL:  On examining her, she looked pale, no jaundice, cyanosis or

thyromegaly.  No jugular venous distention.  No limb edema.

VITAL SIGNS:  Her heart rate was 64, blood pressure was 96/63, temperature 98.7,

respiratory rate 20, and oxygen saturation was 94% on FiO2 of 50%.

HEAD, EYES, EARS, NOSE AND THROAT:  Normocephalic, atraumatic.

NECK:  Supple.

CARDIAC:  Normal first and second heart sounds.  No gallop or murmur.

CHEST:  Shows central trachea, equal bilateral chest expansion, air entry,

vesicular sounds.  No crepitation or rhonchi anteriorly.

ABDOMEN:  Distended, soft, and nontender.

NEUROLOGIC:  She is heavily sedated.



Her intake over the last 24 hours was 2900 and output was 2425.  Her blood sugar

continues to be suboptimally controlled.



LABORATORY DATA:  Her lab work is still pending at the time of this dictation.



ASSESSMENT:

1.  COVID-19 pneumonia.

2.  Acute hypoxic respiratory failure, requiring intubation and mechanical

ventilation.  She is now maintaining her oxygen saturation at 94% on FiO2 of

50%.

3.  She did spike a temperature up to 101.7 and we did send blood for culture

and sensitivity, started empirically on IV vancomycin and Zosyn; however, her

blood culture is so far negative.

4.  The patient has multiple other medical problems including:

A. Migraine headaches.

B.  Type 2 diabetes mellitus, seems to be suboptimally controlled despite

increasing Lantus and NovoLog insulin.

C.  Fibromyalgia.

D.  Hypertension.

E.  Nephrolithiasis.

F.  Nonalcoholic steatohepatitis.

G.  Obstructive sleep apnea.



PLAN:  Obviously to continue with mechanical ventilation, wean as tolerated. 

Continue with IV antibiotic.  Continue with IV Solu-Medrol.  The patient did

receive remdesivir and convalescent plasma and she seemed to be improving.  Her

FiO2 is down to 50%.  Her liver enzymes are trending down.  We will continue

obviously with DVT prophylaxis.  I made multiple adjustments to her insulin,

both Lantus and NovoLog; however, her blood sugar continues to be suboptimally

controlled.

 



______________________________

JULIO CISSE MD



DR:  GOKUL/glo  JOB#:  [id number] / [id number]

DD:  10/19/2020 06:44  DT:  10/19/2020 08:28

## 2020-10-20 VITALS — SYSTOLIC BLOOD PRESSURE: 116 MMHG | DIASTOLIC BLOOD PRESSURE: 63 MMHG

## 2020-10-20 VITALS — SYSTOLIC BLOOD PRESSURE: 108 MMHG | DIASTOLIC BLOOD PRESSURE: 56 MMHG

## 2020-10-20 VITALS — DIASTOLIC BLOOD PRESSURE: 64 MMHG | SYSTOLIC BLOOD PRESSURE: 124 MMHG

## 2020-10-20 VITALS — DIASTOLIC BLOOD PRESSURE: 62 MMHG | SYSTOLIC BLOOD PRESSURE: 121 MMHG

## 2020-10-20 VITALS — SYSTOLIC BLOOD PRESSURE: 141 MMHG | DIASTOLIC BLOOD PRESSURE: 81 MMHG

## 2020-10-20 VITALS — DIASTOLIC BLOOD PRESSURE: 64 MMHG | SYSTOLIC BLOOD PRESSURE: 107 MMHG

## 2020-10-20 VITALS — DIASTOLIC BLOOD PRESSURE: 73 MMHG | SYSTOLIC BLOOD PRESSURE: 164 MMHG

## 2020-10-20 VITALS — SYSTOLIC BLOOD PRESSURE: 126 MMHG | DIASTOLIC BLOOD PRESSURE: 63 MMHG

## 2020-10-20 VITALS — SYSTOLIC BLOOD PRESSURE: 119 MMHG | DIASTOLIC BLOOD PRESSURE: 67 MMHG

## 2020-10-20 VITALS — SYSTOLIC BLOOD PRESSURE: 127 MMHG | DIASTOLIC BLOOD PRESSURE: 81 MMHG

## 2020-10-20 VITALS — DIASTOLIC BLOOD PRESSURE: 78 MMHG | SYSTOLIC BLOOD PRESSURE: 153 MMHG

## 2020-10-20 VITALS — DIASTOLIC BLOOD PRESSURE: 85 MMHG | SYSTOLIC BLOOD PRESSURE: 138 MMHG

## 2020-10-20 VITALS — SYSTOLIC BLOOD PRESSURE: 142 MMHG | DIASTOLIC BLOOD PRESSURE: 78 MMHG

## 2020-10-20 VITALS — SYSTOLIC BLOOD PRESSURE: 99 MMHG | DIASTOLIC BLOOD PRESSURE: 51 MMHG

## 2020-10-20 VITALS — DIASTOLIC BLOOD PRESSURE: 84 MMHG | SYSTOLIC BLOOD PRESSURE: 91 MMHG

## 2020-10-20 VITALS — SYSTOLIC BLOOD PRESSURE: 144 MMHG | DIASTOLIC BLOOD PRESSURE: 80 MMHG

## 2020-10-20 VITALS — DIASTOLIC BLOOD PRESSURE: 65 MMHG | SYSTOLIC BLOOD PRESSURE: 125 MMHG

## 2020-10-20 VITALS — SYSTOLIC BLOOD PRESSURE: 134 MMHG | DIASTOLIC BLOOD PRESSURE: 72 MMHG

## 2020-10-20 VITALS — SYSTOLIC BLOOD PRESSURE: 131 MMHG | DIASTOLIC BLOOD PRESSURE: 77 MMHG

## 2020-10-20 VITALS — DIASTOLIC BLOOD PRESSURE: 76 MMHG | SYSTOLIC BLOOD PRESSURE: 140 MMHG

## 2020-10-20 VITALS — DIASTOLIC BLOOD PRESSURE: 50 MMHG | SYSTOLIC BLOOD PRESSURE: 100 MMHG

## 2020-10-20 VITALS — SYSTOLIC BLOOD PRESSURE: 132 MMHG | DIASTOLIC BLOOD PRESSURE: 70 MMHG

## 2020-10-20 VITALS — DIASTOLIC BLOOD PRESSURE: 72 MMHG | SYSTOLIC BLOOD PRESSURE: 122 MMHG

## 2020-10-20 VITALS — DIASTOLIC BLOOD PRESSURE: 60 MMHG | SYSTOLIC BLOOD PRESSURE: 111 MMHG

## 2020-10-20 LAB
ANION GAP SERPL CALC-SCNC: 4 MMOL/L (ref 6–14)
BASE EXCESS ABG: 2 MMOL/L (ref -3–3)
BUN SERPL-MCNC: 20 MG/DL (ref 7–20)
CALCIUM SERPL-MCNC: 7.7 MG/DL (ref 8.5–10.1)
CHLORIDE SERPL-SCNC: 103 MMOL/L (ref 98–107)
CO2 SERPL-SCNC: 28 MMOL/L (ref 21–32)
CREAT SERPL-MCNC: 0.7 MG/DL (ref 0.6–1)
ERYTHROCYTE [DISTWIDTH] IN BLOOD BY AUTOMATED COUNT: 14.1 % (ref 11.5–14.5)
GFR SERPLBLD BASED ON 1.73 SQ M-ARVRAT: 90.9 ML/MIN
GLUCOSE SERPL-MCNC: 256 MG/DL (ref 70–99)
HCO3 BLDA-SCNC: 25 MMOL/L (ref 21–28)
HCT VFR BLD CALC: 32.5 % (ref 36–47)
HGB BLD-MCNC: 10.6 G/DL (ref 12–15.5)
INSPIRATION SETTING TIME VENT: 50
MCH RBC QN AUTO: 29 PG (ref 25–35)
MCHC RBC AUTO-ENTMCNC: 33 G/DL (ref 31–37)
MCV RBC AUTO: 90 FL (ref 79–100)
PCO2 BLDA: 37 MMHG (ref 35–46)
PLATELET # BLD AUTO: 208 X10^3/UL (ref 140–400)
PO2 BLDA: 69 MMHG (ref 75–108)
POTASSIUM SERPL-SCNC: 4.4 MMOL/L (ref 3.5–5.1)
RBC # BLD AUTO: 3.63 X10^6/UL (ref 3.5–5.4)
SAO2 % BLDA: 93 % (ref 92–99)
SODIUM SERPL-SCNC: 135 MMOL/L (ref 136–145)
WBC # BLD AUTO: 13.1 X10^3/UL (ref 4–11)

## 2020-10-20 RX ADMIN — PROPOFOL PRN MLS/HR: 10 INJECTION, EMULSION INTRAVENOUS at 19:17

## 2020-10-20 RX ADMIN — METHYLPREDNISOLONE SODIUM SUCCINATE SCH MG: 40 INJECTION, POWDER, FOR SOLUTION INTRAMUSCULAR; INTRAVENOUS at 06:24

## 2020-10-20 RX ADMIN — METHYLPREDNISOLONE SODIUM SUCCINATE SCH MG: 40 INJECTION, POWDER, FOR SOLUTION INTRAMUSCULAR; INTRAVENOUS at 22:35

## 2020-10-20 RX ADMIN — INSULIN GLARGINE SCH UNIT: 100 INJECTION, SOLUTION SUBCUTANEOUS at 20:30

## 2020-10-20 RX ADMIN — INSULIN LISPRO SCH UNITS: 100 INJECTION, SOLUTION INTRAVENOUS; SUBCUTANEOUS at 06:32

## 2020-10-20 RX ADMIN — INSULIN LISPRO SCH UNITS: 100 INJECTION, SOLUTION INTRAVENOUS; SUBCUTANEOUS at 17:50

## 2020-10-20 RX ADMIN — INSULIN LISPRO SCH UNITS: 100 INJECTION, SOLUTION INTRAVENOUS; SUBCUTANEOUS at 12:20

## 2020-10-20 RX ADMIN — MIDAZOLAM PRN MLS/HR: 5 INJECTION, SOLUTION INTRAMUSCULAR; INTRAVENOUS at 20:26

## 2020-10-20 RX ADMIN — PIPERACILLIN SODIUM AND TAZOBACTAM SODIUM SCH MLS/HR: 3; .375 INJECTION, POWDER, LYOPHILIZED, FOR SOLUTION INTRAVENOUS at 17:50

## 2020-10-20 RX ADMIN — INSULIN LISPRO SCH UNITS: 100 INJECTION, SOLUTION INTRAVENOUS; SUBCUTANEOUS at 06:31

## 2020-10-20 RX ADMIN — PIPERACILLIN SODIUM AND TAZOBACTAM SODIUM SCH MLS/HR: 3; .375 INJECTION, POWDER, LYOPHILIZED, FOR SOLUTION INTRAVENOUS at 06:25

## 2020-10-20 RX ADMIN — MIDAZOLAM PRN MLS/HR: 5 INJECTION, SOLUTION INTRAMUSCULAR; INTRAVENOUS at 07:16

## 2020-10-20 RX ADMIN — Medication PRN MLS/HR: at 09:35

## 2020-10-20 RX ADMIN — MIDAZOLAM PRN MLS/HR: 5 INJECTION, SOLUTION INTRAMUSCULAR; INTRAVENOUS at 00:14

## 2020-10-20 RX ADMIN — MIDAZOLAM PRN MLS/HR: 5 INJECTION, SOLUTION INTRAMUSCULAR; INTRAVENOUS at 12:38

## 2020-10-20 RX ADMIN — METHYLPREDNISOLONE SODIUM SUCCINATE SCH MG: 40 INJECTION, POWDER, FOR SOLUTION INTRAMUSCULAR; INTRAVENOUS at 17:49

## 2020-10-20 RX ADMIN — INSULIN LISPRO SCH UNITS: 100 INJECTION, SOLUTION INTRAVENOUS; SUBCUTANEOUS at 17:51

## 2020-10-20 RX ADMIN — LINAGLIPTIN SCH MG: 5 TABLET, FILM COATED ORAL at 08:33

## 2020-10-20 RX ADMIN — LISINOPRIL SCH MG: 5 TABLET ORAL at 08:35

## 2020-10-20 RX ADMIN — PIPERACILLIN SODIUM AND TAZOBACTAM SODIUM SCH MLS/HR: 3; .375 INJECTION, POWDER, LYOPHILIZED, FOR SOLUTION INTRAVENOUS at 12:21

## 2020-10-20 RX ADMIN — PANTOPRAZOLE SODIUM SCH MG: 40 INJECTION, POWDER, FOR SOLUTION INTRAVENOUS at 08:35

## 2020-10-20 RX ADMIN — ENOXAPARIN SODIUM SCH MG: 40 INJECTION SUBCUTANEOUS at 08:35

## 2020-10-20 RX ADMIN — INSULIN GLARGINE SCH UNIT: 100 INJECTION, SOLUTION SUBCUTANEOUS at 09:22

## 2020-10-20 RX ADMIN — ENOXAPARIN SODIUM SCH MG: 40 INJECTION SUBCUTANEOUS at 20:27

## 2020-10-20 NOTE — PN
DATE:  10/20/2020



SUBJECTIVE:  The patient is resting, slightly propped up, continued to be

sedated, intubated and mechanically ventilated.  She is now maintaining her

oxygen saturation at 94% on FiO2 of 50%.  Nursing staff did not voice any

concerns that she has an eventful night.



PHYSICAL EXAMINATION:

GENERAL:  When I examined her, she was pale, no jaundice, cyanosis or

thyromegaly.  No jugular venous distention.  No limb edema.

VITAL SIGNS:  Her heart rate was 65, blood pressure was 153/78, temperature was

98, respiratory rate was 20, and oxygen saturation was 94%.

HEAD, EYES, EARS, NOSE, AND THROAT:  Normocephalic, atraumatic.  She has

orotracheal and orogastric tube.

NECK:  Supple.

CARDIAC:  Normal first and second heart sounds.  No gallop or murmur.

CHEST:  Showed central trachea, equal bilateral chest expansion, air entry,

vesicular sounds.  No crepitation or rhonchi anteriorly.

ABDOMEN:  Distended, soft, nontender.

NEUROLOGIC:  She is heavily sedated.



Her intake was 3500, output was 2150.



LABORATORY DATA:  Her lab work as of this morning showed a serum sodium 135,

potassium 4.4, chloride 103, bicarbonate 28, anion gap of 4, BUN 20, creatinine

0.7, estimated GFR was 90 mL per minute.  Her glucose 256 and calcium was 7.7. 

Her white cell count was 13,000, hemoglobin 11, hematocrit 33, MCV 90, and

platelet count 208,000.  D-dimer is down to 1.43.



ASSESSMENT:

1.  COVID-19 pneumonia.

2.  Acute hypoxic respiratory failure requiring intubation and mechanical

ventilation.  She is now maintaining her oxygen saturation at 94% on FiO2 of

50%.

3.  She did spike a temperature up to 101.7 and we did send blood for culture

and sensitivity.  She is currently on IV vancomycin and Zosyn; however, her

blood culture are so far negative.

4.  The patient has multiple other medical problems including:

A.  Migraine headache.

B.  Type 2 diabetes mellitus, seems to be suboptimally controlled.  I have

adjusted her Lantus and NovoLog insulin almost on a daily basis.

C.  Fibromyalgia.

D.  Hypertension.

E.  Nephrolithiasis.

F.  Nonalcoholic steatohepatitis.

G.  Obstructive sleep apnea.



PLAN:  To continue mechanical ventilation, wean as tolerated.  Continue IV

antibiotic.  Continue with IV Solu-Medrol.  The patient did receive remdesivir

and convalescent plasma and she seemed to be improving.  Her FiO2 now is down to

50%.  Her liver enzymes are trending down.  We will obviously continue with DVT

prophylaxis.

 



______________________________

JULIO CISSE MD



DR:  GOKUL/glo  JOB#:  [id number] / [id number]

DD:  10/20/2020 07:38  DT:  10/20/2020 08:48

## 2020-10-20 NOTE — NUR
SS following up with discharge planning. SS reviewed pt chart and discussed with pt RN. Pt 
remains on the vent at this time at 50%. Pt on IV Zosyn. COVID19 positive. SS phoned and 
faxed referral to [facility], [phone]; fax [phone]. SS will 
continue to follow for discharge planning.

## 2020-10-20 NOTE — PDOC
PULMONARY PROGRESS NOTES


DATE: 10/20/20 


TIME: 08:35


Subjective


Patient was intubated on 10/12/20 


AC control, sedated.


Vitals





Vital Signs








  Date Time  Temp Pulse Resp B/P (MAP) Pulse Ox O2 Delivery O2 Flow Rate FiO2


 


10/20/20 08:00  63 20 125/65 (85) 95 Ventilator  


 


10/20/20 04:00 98.0       





 98.0       








Comments


Pt. is seen during covid 19 pandemic, visual exam preformed 


on vent sedated


no accessory muscle use


RRR


no edema or rash noted


Labs





Laboratory Tests








Test


 10/18/20


11:05 10/18/20


17:21 10/18/20


21:10 10/18/20


21:16


 


Glucose (Fingerstick)


 296 mg/dL


(70-99) 230 mg/dL


(70-99) 


 222 mg/dL


(70-99)


 


Vancomycin Level Trough


 


 


 13.8 mcg/mL


(10.0-20.0) 





 


Vancomycin Last Dose Date   10/18/2020  


 


Vancomycin Last Dose Time   0530  


 


Test


 10/19/20


01:16 10/19/20


06:26 10/19/20


09:05 10/19/20


11:19


 


Glucose (Fingerstick)


 253 mg/dL


(70-99) 281 mg/dL


(70-99) 


 239 mg/dL


(70-99)


 


O2 Saturation   94 % (92-99)  


 


Arterial Blood pH


 


 


 7.44


(7.35-7.45) 





 


Arterial Blood pCO2 at


Patient Temp 


 


 38 mmHg


(35-46) 





 


Arterial Blood pO2 at Patient


Temp 


 


 73 mmHg


() 





 


Arterial Blood HCO3


 


 


 25 mmol/L


(21-28) 





 


Arterial Blood Base Excess


 


 


 1 mmol/L


(-3-3) 





 


FiO2   50  


 


Test


 10/19/20


17:11 10/19/20


23:50 10/20/20


05:20 





 


Glucose (Fingerstick)


 202 mg/dL


(70-99) 239 mg/dL


(70-99) 


 





 


White Blood Count


 


 


 13.1 x10^3/uL


(4.0-11.0) 





 


Red Blood Count


 


 


 3.63 x10^6/uL


(3.50-5.40) 





 


Hemoglobin


 


 


 10.6 g/dL


(12.0-15.5) 





 


Hematocrit


 


 


 32.5 %


(36.0-47.0) 





 


Mean Corpuscular Volume   90 fL ()  


 


Mean Corpuscular Hemoglobin   29 pg (25-35)  


 


Mean Corpuscular Hemoglobin


Concent 


 


 33 g/dL


(31-37) 





 


Red Cell Distribution Width


 


 


 14.1 %


(11.5-14.5) 





 


Platelet Count


 


 


 208 x10^3/uL


(140-400) 





 


Sodium Level


 


 


 135 mmol/L


(136-145) 





 


Potassium Level


 


 


 4.4 mmol/L


(3.5-5.1) 





 


Chloride Level


 


 


 103 mmol/L


() 





 


Carbon Dioxide Level


 


 


 28 mmol/L


(21-32) 





 


Anion Gap   4 (6-14)  


 


Blood Urea Nitrogen


 


 


 20 mg/dL


(7-20) 





 


Creatinine


 


 


 0.7 mg/dL


(0.6-1.0) 





 


Estimated GFR


(Cockcroft-Gault) 


 


 90.9 


 





 


Glucose Level


 


 


 256 mg/dL


(70-99) 





 


Calcium Level


 


 


 7.7 mg/dL


(8.5-10.1) 











Laboratory Tests








Test


 10/19/20


09:05 10/19/20


11:19 10/19/20


17:11 10/19/20


23:50


 


O2 Saturation 94 % (92-99)    


 


Arterial Blood pH


 7.44


(7.35-7.45) 


 


 





 


Arterial Blood pCO2 at


Patient Temp 38 mmHg


(35-46) 


 


 





 


Arterial Blood pO2 at Patient


Temp 73 mmHg


() 


 


 





 


Arterial Blood HCO3


 25 mmol/L


(21-28) 


 


 





 


Arterial Blood Base Excess


 1 mmol/L


(-3-3) 


 


 





 


FiO2 50    


 


Glucose (Fingerstick)


 


 239 mg/dL


(70-99) 202 mg/dL


(70-99) 239 mg/dL


(70-99)


 


Test


 10/20/20


05:20 


 


 





 


White Blood Count


 13.1 x10^3/uL


(4.0-11.0) 


 


 





 


Red Blood Count


 3.63 x10^6/uL


(3.50-5.40) 


 


 





 


Hemoglobin


 10.6 g/dL


(12.0-15.5) 


 


 





 


Hematocrit


 32.5 %


(36.0-47.0) 


 


 





 


Mean Corpuscular Volume 90 fL ()    


 


Mean Corpuscular Hemoglobin 29 pg (25-35)    


 


Mean Corpuscular Hemoglobin


Concent 33 g/dL


(31-37) 


 


 





 


Red Cell Distribution Width


 14.1 %


(11.5-14.5) 


 


 





 


Platelet Count


 208 x10^3/uL


(140-400) 


 


 





 


Sodium Level


 135 mmol/L


(136-145) 


 


 





 


Potassium Level


 4.4 mmol/L


(3.5-5.1) 


 


 





 


Chloride Level


 103 mmol/L


() 


 


 





 


Carbon Dioxide Level


 28 mmol/L


(21-32) 


 


 





 


Anion Gap 4 (6-14)    


 


Blood Urea Nitrogen


 20 mg/dL


(7-20) 


 


 





 


Creatinine


 0.7 mg/dL


(0.6-1.0) 


 


 





 


Estimated GFR


(Cockcroft-Gault) 90.9 


 


 


 





 


Glucose Level


 256 mg/dL


(70-99) 


 


 





 


Calcium Level


 7.7 mg/dL


(8.5-10.1) 


 


 











Medications





Active Scripts








 Medications  Dose


 Route/Sig


 Max Daily Dose Days Date Category


 


 Cymbalta


  (Duloxetine Hcl)


 20 Mg Capsule.dr  20 Mg


 PO DAILY


   9/22/20 Reported


 


 Lisinopril 5 Mg


 Tablet  5 Mg


 PO DAILY


   9/22/20 Reported


 


 Amitriptyline Hcl


 10 Mg Tablet  10 Mg


 PO DAILY


   9/22/20 Reported


 


 Trulicity


  (Dulaglutide) 1.5


 Mg/0.5 Ml


 Pen.injctr  1.5 Mg


 SQ


   9/22/20 Reported


 


 Nesina


  (Alogliptin


 Benzoate) 12.5 Mg


 Tablet  12.5 Mg


 PO


   9/22/20 Reported


 


 Propranolol Hcl


 80 Mg Tablet  80 Mg


 PO DAILY


   5/14/14 Reported


 


 Cymbalta


  (Duloxetine Hcl)


 60 Mg Capsule.dr  60 Mg


 PO DAILY


   5/14/14 Reported








Comments


CXR 10/17 reviewed


1. ET tube terminates 3.7 cm above the hiren.


2. Lung volumes are lower and show increasing consolidation at the left 


lung base.-


 





CXR 10/19/20


IMPRESSION:


1. Endotracheal tube appears higher or more cephalad position compared to 


the prior study although positioning is somewhat different.


2. Persistent hazy infiltrates





Impression


.


IMPRESSION:


1.  Acute hypoxemic respiratory failure.-- S/P intubation on 10/12/20 

--improving 


2.  COVID-19 viral pneumonia.


3.  Abnormal chest x-ray, possible gram-positive, gram-negative pneumonia.


4.  Headaches.


5.  Type 2 diabetes.


6.  Fibromyalgia.


7.  Morbid obesity.


8.  Nonalcoholic steatohepatitis.


9.  Obstructive sleep apnea.


10. Abnormal D-Dimer due to COVID, improving 10/15





Plan


.


Continue current support now on 7 of PEEP 50% FiO2


If continues to improve may consider trial in the a.m.


S/P plasma, cont. full course of remdesivir 


Cont. steroids, with taper  


ABX per ID: [email or web address] 


Continue tube feeding for nutritional support


Follow clinical course 


DVT/GI PPX; protonix and BID lovenox 





D/W RN and RT 


Critical care time from 205 to 2:35 PM











LEWIS DICK MD              Oct 20, 2020 08:35

## 2020-10-20 NOTE — PDOC
Infectious Disease Note


Subjective


Subjective


intubated on vent





ROS


ROS


no n/v/d/





Vital Sign


Vital Signs





Vital Signs








  Date Time  Temp Pulse Resp B/P (MAP) Pulse Ox O2 Delivery O2 Flow Rate FiO2


 


10/20/20 08:00  63 20 125/65 (85) 95 Ventilator  


 


10/20/20 04:00 98.0       





 98.0       











Physical Exam


PHYSICAL EXAM


GENERAL:  intubated on vent


HEENT:  Normocephalic, atraumatic.  Anicteric.


NECK:  Supple.  No thyromegaly.


LUNGS:  Coarse breath sounds.  No wheezing.


HEART:  S1, S2, no murmurs.


ABDOMEN:  Obese, soft, nontender, bowel sounds present.


EXTREMITIES:  No edema, no cyanosis.


DERMATOLOGIC:  Warm, dry.  No generalized rash.  Multiple tattoos.


CENTRAL NERVOUS SYSTEM:  sedated intubated





Labs


Lab





Laboratory Tests








Test


 10/19/20


09:05 10/19/20


11:19 10/19/20


17:11 10/19/20


23:50


 


O2 Saturation 94 % (92-99)    


 


Arterial Blood pH


 7.44


(7.35-7.45) 


 


 





 


Arterial Blood pCO2 at


Patient Temp 38 mmHg


(35-46) 


 


 





 


Arterial Blood pO2 at Patient


Temp 73 mmHg


() 


 


 





 


Arterial Blood HCO3


 25 mmol/L


(21-28) 


 


 





 


Arterial Blood Base Excess


 1 mmol/L


(-3-3) 


 


 





 


FiO2 50    


 


Glucose (Fingerstick)


 


 239 mg/dL


(70-99) 202 mg/dL


(70-99) 239 mg/dL


(70-99)


 


Test


 10/20/20


05:20 


 


 





 


White Blood Count


 13.1 x10^3/uL


(4.0-11.0) 


 


 





 


Red Blood Count


 3.63 x10^6/uL


(3.50-5.40) 


 


 





 


Hemoglobin


 10.6 g/dL


(12.0-15.5) 


 


 





 


Hematocrit


 32.5 %


(36.0-47.0) 


 


 





 


Mean Corpuscular Volume 90 fL ()    


 


Mean Corpuscular Hemoglobin 29 pg (25-35)    


 


Mean Corpuscular Hemoglobin


Concent 33 g/dL


(31-37) 


 


 





 


Red Cell Distribution Width


 14.1 %


(11.5-14.5) 


 


 





 


Platelet Count


 208 x10^3/uL


(140-400) 


 


 





 


Sodium Level


 135 mmol/L


(136-145) 


 


 





 


Potassium Level


 4.4 mmol/L


(3.5-5.1) 


 


 





 


Chloride Level


 103 mmol/L


() 


 


 





 


Carbon Dioxide Level


 28 mmol/L


(21-32) 


 


 





 


Anion Gap 4 (6-14)    


 


Blood Urea Nitrogen


 20 mg/dL


(7-20) 


 


 





 


Creatinine


 0.7 mg/dL


(0.6-1.0) 


 


 





 


Estimated GFR


(Cockcroft-Gault) 90.9 


 


 


 





 


Glucose Level


 256 mg/dL


(70-99) 


 


 





 


Calcium Level


 7.7 mg/dL


(8.5-10.1) 


 


 











Micro





Microbiology


10/15/20 Blood Culture - Preliminary, Resulted


           NO GROWTH AFTER 1 DAY





Objective


Assessment


1.  Acute hypoxic respiratory failure.


2.  COVID-19 Pneumonia


3.  Migraine headaches.


4.  Diabetes.


5.  Obesity.


6.  Fibromyalgia.


7.  Abnormal LFTs.





Plan


Plan of Care


cont steroids


cont Remdesivir


supportive care


 zosyn


slowly improving











HEDY BRAND MD               Oct 20, 2020 08:12

## 2020-10-21 VITALS — SYSTOLIC BLOOD PRESSURE: 123 MMHG | DIASTOLIC BLOOD PRESSURE: 65 MMHG

## 2020-10-21 VITALS — DIASTOLIC BLOOD PRESSURE: 67 MMHG | SYSTOLIC BLOOD PRESSURE: 123 MMHG

## 2020-10-21 VITALS — DIASTOLIC BLOOD PRESSURE: 89 MMHG | SYSTOLIC BLOOD PRESSURE: 151 MMHG

## 2020-10-21 VITALS — DIASTOLIC BLOOD PRESSURE: 66 MMHG | SYSTOLIC BLOOD PRESSURE: 110 MMHG

## 2020-10-21 VITALS — DIASTOLIC BLOOD PRESSURE: 82 MMHG | SYSTOLIC BLOOD PRESSURE: 129 MMHG

## 2020-10-21 VITALS — DIASTOLIC BLOOD PRESSURE: 50 MMHG | SYSTOLIC BLOOD PRESSURE: 90 MMHG

## 2020-10-21 VITALS — SYSTOLIC BLOOD PRESSURE: 149 MMHG | DIASTOLIC BLOOD PRESSURE: 93 MMHG

## 2020-10-21 VITALS — DIASTOLIC BLOOD PRESSURE: 88 MMHG | SYSTOLIC BLOOD PRESSURE: 148 MMHG

## 2020-10-21 VITALS — SYSTOLIC BLOOD PRESSURE: 154 MMHG | DIASTOLIC BLOOD PRESSURE: 96 MMHG

## 2020-10-21 VITALS — DIASTOLIC BLOOD PRESSURE: 78 MMHG | SYSTOLIC BLOOD PRESSURE: 132 MMHG

## 2020-10-21 VITALS — SYSTOLIC BLOOD PRESSURE: 100 MMHG | DIASTOLIC BLOOD PRESSURE: 53 MMHG

## 2020-10-21 VITALS — DIASTOLIC BLOOD PRESSURE: 70 MMHG | SYSTOLIC BLOOD PRESSURE: 116 MMHG

## 2020-10-21 VITALS — DIASTOLIC BLOOD PRESSURE: 50 MMHG | SYSTOLIC BLOOD PRESSURE: 94 MMHG

## 2020-10-21 VITALS — SYSTOLIC BLOOD PRESSURE: 91 MMHG | DIASTOLIC BLOOD PRESSURE: 46 MMHG

## 2020-10-21 VITALS — DIASTOLIC BLOOD PRESSURE: 92 MMHG | SYSTOLIC BLOOD PRESSURE: 148 MMHG

## 2020-10-21 VITALS — SYSTOLIC BLOOD PRESSURE: 111 MMHG | DIASTOLIC BLOOD PRESSURE: 80 MMHG

## 2020-10-21 VITALS — SYSTOLIC BLOOD PRESSURE: 110 MMHG | DIASTOLIC BLOOD PRESSURE: 65 MMHG

## 2020-10-21 VITALS — DIASTOLIC BLOOD PRESSURE: 68 MMHG | SYSTOLIC BLOOD PRESSURE: 126 MMHG

## 2020-10-21 VITALS — SYSTOLIC BLOOD PRESSURE: 149 MMHG | DIASTOLIC BLOOD PRESSURE: 90 MMHG

## 2020-10-21 VITALS — DIASTOLIC BLOOD PRESSURE: 45 MMHG | SYSTOLIC BLOOD PRESSURE: 91 MMHG

## 2020-10-21 VITALS — DIASTOLIC BLOOD PRESSURE: 44 MMHG | SYSTOLIC BLOOD PRESSURE: 87 MMHG

## 2020-10-21 VITALS — DIASTOLIC BLOOD PRESSURE: 69 MMHG | SYSTOLIC BLOOD PRESSURE: 108 MMHG

## 2020-10-21 VITALS — SYSTOLIC BLOOD PRESSURE: 146 MMHG | DIASTOLIC BLOOD PRESSURE: 92 MMHG

## 2020-10-21 VITALS — SYSTOLIC BLOOD PRESSURE: 102 MMHG | DIASTOLIC BLOOD PRESSURE: 59 MMHG

## 2020-10-21 LAB
ALBUMIN SERPL-MCNC: 1.7 G/DL (ref 3.4–5)
ALBUMIN/GLOB SERPL: 0.6 {RATIO} (ref 1–1.7)
ALP SERPL-CCNC: 112 U/L (ref 46–116)
ALT SERPL-CCNC: 316 U/L (ref 14–59)
ANION GAP SERPL CALC-SCNC: 4 MMOL/L (ref 6–14)
AST SERPL-CCNC: 99 U/L (ref 15–37)
BASE EXCESS ABG: 2 MMOL/L (ref -3–3)
BILIRUB SERPL-MCNC: 0.4 MG/DL (ref 0.2–1)
BUN SERPL-MCNC: 17 MG/DL (ref 7–20)
BUN/CREAT SERPL: 28 (ref 6–20)
CALCIUM SERPL-MCNC: 8 MG/DL (ref 8.5–10.1)
CHLORIDE SERPL-SCNC: 101 MMOL/L (ref 98–107)
CO2 SERPL-SCNC: 31 MMOL/L (ref 21–32)
CREAT SERPL-MCNC: 0.6 MG/DL (ref 0.6–1)
ERYTHROCYTE [DISTWIDTH] IN BLOOD BY AUTOMATED COUNT: 14.5 % (ref 11.5–14.5)
GFR SERPLBLD BASED ON 1.73 SQ M-ARVRAT: 108.6 ML/MIN
GLUCOSE SERPL-MCNC: 273 MG/DL (ref 70–99)
HCO3 BLDA-SCNC: 27 MMOL/L (ref 21–28)
HCT VFR BLD CALC: 33.4 % (ref 36–47)
HGB BLD-MCNC: 10.9 G/DL (ref 12–15.5)
INSPIRATION SETTING TIME VENT: 50
MCH RBC QN AUTO: 30 PG (ref 25–35)
MCHC RBC AUTO-ENTMCNC: 33 G/DL (ref 31–37)
MCV RBC AUTO: 91 FL (ref 79–100)
PCO2 BLDA: 44 MMHG (ref 35–46)
PLATELET # BLD AUTO: 232 X10^3/UL (ref 140–400)
PO2 BLDA: 74 MMHG (ref 75–108)
POTASSIUM SERPL-SCNC: 4.5 MMOL/L (ref 3.5–5.1)
PROT SERPL-MCNC: 4.7 G/DL (ref 6.4–8.2)
RBC # BLD AUTO: 3.67 X10^6/UL (ref 3.5–5.4)
SAO2 % BLDA: 94 % (ref 92–99)
SODIUM SERPL-SCNC: 136 MMOL/L (ref 136–145)
WBC # BLD AUTO: 14.3 X10^3/UL (ref 4–11)

## 2020-10-21 RX ADMIN — INSULIN LISPRO SCH UNITS: 100 INJECTION, SOLUTION INTRAVENOUS; SUBCUTANEOUS at 11:55

## 2020-10-21 RX ADMIN — MIDAZOLAM PRN MLS/HR: 5 INJECTION, SOLUTION INTRAMUSCULAR; INTRAVENOUS at 06:09

## 2020-10-21 RX ADMIN — PROPOFOL PRN MLS/HR: 10 INJECTION, EMULSION INTRAVENOUS at 03:20

## 2020-10-21 RX ADMIN — PANTOPRAZOLE SODIUM SCH MG: 40 INJECTION, POWDER, FOR SOLUTION INTRAVENOUS at 10:15

## 2020-10-21 RX ADMIN — METHYLPREDNISOLONE SODIUM SUCCINATE SCH MG: 40 INJECTION, POWDER, FOR SOLUTION INTRAMUSCULAR; INTRAVENOUS at 14:45

## 2020-10-21 RX ADMIN — ENOXAPARIN SODIUM SCH MG: 40 INJECTION SUBCUTANEOUS at 20:17

## 2020-10-21 RX ADMIN — LISINOPRIL SCH MG: 5 TABLET ORAL at 08:50

## 2020-10-21 RX ADMIN — Medication PRN MLS/HR: at 07:30

## 2020-10-21 RX ADMIN — INSULIN LISPRO SCH UNITS: 100 INJECTION, SOLUTION INTRAVENOUS; SUBCUTANEOUS at 17:31

## 2020-10-21 RX ADMIN — PIPERACILLIN SODIUM AND TAZOBACTAM SODIUM SCH MLS/HR: 3; .375 INJECTION, POWDER, LYOPHILIZED, FOR SOLUTION INTRAVENOUS at 00:14

## 2020-10-21 RX ADMIN — INSULIN GLARGINE SCH UNIT: 100 INJECTION, SOLUTION SUBCUTANEOUS at 09:37

## 2020-10-21 RX ADMIN — PIPERACILLIN SODIUM AND TAZOBACTAM SODIUM SCH MLS/HR: 3; .375 INJECTION, POWDER, LYOPHILIZED, FOR SOLUTION INTRAVENOUS at 06:50

## 2020-10-21 RX ADMIN — INSULIN GLARGINE SCH UNIT: 100 INJECTION, SOLUTION SUBCUTANEOUS at 20:20

## 2020-10-21 RX ADMIN — INSULIN LISPRO SCH UNITS: 100 INJECTION, SOLUTION INTRAVENOUS; SUBCUTANEOUS at 06:51

## 2020-10-21 RX ADMIN — PROPOFOL PRN MLS/HR: 10 INJECTION, EMULSION INTRAVENOUS at 21:01

## 2020-10-21 RX ADMIN — HALOPERIDOL LACTATE SCH MG: 5 INJECTION, SOLUTION INTRAMUSCULAR at 10:17

## 2020-10-21 RX ADMIN — INSULIN LISPRO SCH UNITS: 100 INJECTION, SOLUTION INTRAVENOUS; SUBCUTANEOUS at 00:16

## 2020-10-21 RX ADMIN — LINAGLIPTIN SCH MG: 5 TABLET, FILM COATED ORAL at 08:50

## 2020-10-21 RX ADMIN — INSULIN LISPRO SCH UNITS: 100 INJECTION, SOLUTION INTRAVENOUS; SUBCUTANEOUS at 17:32

## 2020-10-21 RX ADMIN — INSULIN LISPRO SCH UNITS: 100 INJECTION, SOLUTION INTRAVENOUS; SUBCUTANEOUS at 00:15

## 2020-10-21 RX ADMIN — METHYLPREDNISOLONE SODIUM SUCCINATE SCH MG: 40 INJECTION, POWDER, FOR SOLUTION INTRAMUSCULAR; INTRAVENOUS at 22:12

## 2020-10-21 RX ADMIN — HALOPERIDOL LACTATE SCH MG: 5 INJECTION, SOLUTION INTRAMUSCULAR at 16:11

## 2020-10-21 RX ADMIN — PIPERACILLIN SODIUM AND TAZOBACTAM SODIUM SCH MLS/HR: 3; .375 INJECTION, POWDER, LYOPHILIZED, FOR SOLUTION INTRAVENOUS at 12:41

## 2020-10-21 RX ADMIN — PIPERACILLIN SODIUM AND TAZOBACTAM SODIUM SCH MLS/HR: 3; .375 INJECTION, POWDER, LYOPHILIZED, FOR SOLUTION INTRAVENOUS at 17:32

## 2020-10-21 RX ADMIN — METHYLPREDNISOLONE SODIUM SUCCINATE SCH MG: 40 INJECTION, POWDER, FOR SOLUTION INTRAMUSCULAR; INTRAVENOUS at 06:50

## 2020-10-21 RX ADMIN — INSULIN LISPRO SCH UNITS: 100 INJECTION, SOLUTION INTRAVENOUS; SUBCUTANEOUS at 06:52

## 2020-10-21 RX ADMIN — PROPOFOL PRN MLS/HR: 10 INJECTION, EMULSION INTRAVENOUS at 10:18

## 2020-10-21 RX ADMIN — ENOXAPARIN SODIUM SCH MG: 40 INJECTION SUBCUTANEOUS at 08:51

## 2020-10-21 RX ADMIN — HALOPERIDOL LACTATE SCH MG: 5 INJECTION, SOLUTION INTRAMUSCULAR at 22:12

## 2020-10-21 NOTE — PDOC
PULMONARY PROGRESS NOTES


DATE: 10/21/20 


TIME: 08:18


Subjective


Patient was intubated on 10/12/20 


AC control, sedated.


afebrile 


no overnight concerns from nursing


Vitals





Vital Signs








  Date Time  Temp Pulse Resp B/P (MAP) Pulse Ox O2 Delivery O2 Flow Rate FiO2


 


10/21/20 06:00  68 20 116/70 (85) 95 Ventilator  


 


10/21/20 04:00 98.0       





 98.0       


 


10/20/20 10:05       50.0 








Comments


Pt. is seen during covid 19 pandemic, visual exam preformed 


on vent sedated


no accessory muscle use


RRR


no edema or rash noted


Labs





Laboratory Tests








Test


 10/19/20


09:05 10/19/20


11:19 10/19/20


17:11 10/19/20


23:50


 


O2 Saturation 94 % (92-99)    


 


Arterial Blood pH


 7.44


(7.35-7.45) 


 


 





 


Arterial Blood pCO2 at


Patient Temp 38 mmHg


(35-46) 


 


 





 


Arterial Blood pO2 at Patient


Temp 73 mmHg


() 


 


 





 


Arterial Blood HCO3


 25 mmol/L


(21-28) 


 


 





 


Arterial Blood Base Excess


 1 mmol/L


(-3-3) 


 


 





 


FiO2 50    


 


Glucose (Fingerstick)


 


 239 mg/dL


(70-99) 202 mg/dL


(70-99) 239 mg/dL


(70-99)


 


Test


 10/20/20


05:20 10/20/20


08:30 10/20/20


11:46 10/20/20


17:36


 


White Blood Count


 13.1 x10^3/uL


(4.0-11.0) 


 


 





 


Red Blood Count


 3.63 x10^6/uL


(3.50-5.40) 


 


 





 


Hemoglobin


 10.6 g/dL


(12.0-15.5) 


 


 





 


Hematocrit


 32.5 %


(36.0-47.0) 


 


 





 


Mean Corpuscular Volume 90 fL ()    


 


Mean Corpuscular Hemoglobin 29 pg (25-35)    


 


Mean Corpuscular Hemoglobin


Concent 33 g/dL


(31-37) 


 


 





 


Red Cell Distribution Width


 14.1 %


(11.5-14.5) 


 


 





 


Platelet Count


 208 x10^3/uL


(140-400) 


 


 





 


Sodium Level


 135 mmol/L


(136-145) 


 


 





 


Potassium Level


 4.4 mmol/L


(3.5-5.1) 


 


 





 


Chloride Level


 103 mmol/L


() 


 


 





 


Carbon Dioxide Level


 28 mmol/L


(21-32) 


 


 





 


Anion Gap 4 (6-14)    


 


Blood Urea Nitrogen


 20 mg/dL


(7-20) 


 


 





 


Creatinine


 0.7 mg/dL


(0.6-1.0) 


 


 





 


Estimated GFR


(Cockcroft-Gault) 90.9 


 


 


 





 


Glucose Level


 256 mg/dL


(70-99) 


 


 





 


Calcium Level


 7.7 mg/dL


(8.5-10.1) 


 


 





 


O2 Saturation  93 % (92-99)   


 


Arterial Blood pH


 


 7.45


(7.35-7.45) 


 





 


Arterial Blood pCO2 at


Patient Temp 


 37 mmHg


(35-46) 


 





 


Arterial Blood pO2 at Patient


Temp 


 69 mmHg


() 


 





 


Arterial Blood HCO3


 


 25 mmol/L


(21-28) 


 





 


Arterial Blood Base Excess


 


 2 mmol/L


(-3-3) 


 





 


FiO2  50   


 


Glucose (Fingerstick)


 


 


 196 mg/dL


(70-99) 184 mg/dL


(70-99)


 


Test


 10/21/20


00:05 10/21/20


05:52 10/21/20


07:00 





 


Glucose (Fingerstick)


 182 mg/dL


(70-99) 254 mg/dL


(70-99) 


 





 


White Blood Count


 


 


 14.3 x10^3/uL


(4.0-11.0) 





 


Red Blood Count


 


 


 3.67 x10^6/uL


(3.50-5.40) 





 


Hemoglobin


 


 


 10.9 g/dL


(12.0-15.5) 





 


Hematocrit


 


 


 33.4 %


(36.0-47.0) 





 


Mean Corpuscular Volume   91 fL ()  


 


Mean Corpuscular Hemoglobin   30 pg (25-35)  


 


Mean Corpuscular Hemoglobin


Concent 


 


 33 g/dL


(31-37) 





 


Red Cell Distribution Width


 


 


 14.5 %


(11.5-14.5) 





 


Platelet Count


 


 


 232 x10^3/uL


(140-400) 





 


Sodium Level


 


 


 136 mmol/L


(136-145) 





 


Potassium Level


 


 


 4.5 mmol/L


(3.5-5.1) 





 


Chloride Level


 


 


 101 mmol/L


() 





 


Carbon Dioxide Level


 


 


 31 mmol/L


(21-32) 





 


Anion Gap   4 (6-14)  


 


Blood Urea Nitrogen


 


 


 17 mg/dL


(7-20) 





 


Creatinine


 


 


 0.6 mg/dL


(0.6-1.0) 





 


Estimated GFR


(Cockcroft-Gault) 


 


 108.6 


 





 


BUN/Creatinine Ratio   28 (6-20)  


 


Glucose Level


 


 


 273 mg/dL


(70-99) 





 


Calcium Level


 


 


 8.0 mg/dL


(8.5-10.1) 





 


Total Bilirubin


 


 


 0.4 mg/dL


(0.2-1.0) 





 


Aspartate Amino Transf


(AST/SGOT) 


 


 99 U/L (15-37) 


 





 


Alanine Aminotransferase


(ALT/SGPT) 


 


 316 U/L


(14-59) 





 


Alkaline Phosphatase


 


 


 112 U/L


() 





 


Total Protein


 


 


 4.7 g/dL


(6.4-8.2) 





 


Albumin


 


 


 1.7 g/dL


(3.4-5.0) 





 


Albumin/Globulin Ratio   0.6 (1.0-1.7)  








Laboratory Tests








Test


 10/20/20


08:30 10/20/20


11:46 10/20/20


17:36 10/21/20


00:05


 


O2 Saturation 93 % (92-99)    


 


Arterial Blood pH


 7.45


(7.35-7.45) 


 


 





 


Arterial Blood pCO2 at


Patient Temp 37 mmHg


(35-46) 


 


 





 


Arterial Blood pO2 at Patient


Temp 69 mmHg


() 


 


 





 


Arterial Blood HCO3


 25 mmol/L


(21-28) 


 


 





 


Arterial Blood Base Excess


 2 mmol/L


(-3-3) 


 


 





 


FiO2 50    


 


Glucose (Fingerstick)


 


 196 mg/dL


(70-99) 184 mg/dL


(70-99) 182 mg/dL


(70-99)


 


Test


 10/21/20


05:52 10/21/20


07:00 


 





 


Glucose (Fingerstick)


 254 mg/dL


(70-99) 


 


 





 


White Blood Count


 


 14.3 x10^3/uL


(4.0-11.0) 


 





 


Red Blood Count


 


 3.67 x10^6/uL


(3.50-5.40) 


 





 


Hemoglobin


 


 10.9 g/dL


(12.0-15.5) 


 





 


Hematocrit


 


 33.4 %


(36.0-47.0) 


 





 


Mean Corpuscular Volume  91 fL ()   


 


Mean Corpuscular Hemoglobin  30 pg (25-35)   


 


Mean Corpuscular Hemoglobin


Concent 


 33 g/dL


(31-37) 


 





 


Red Cell Distribution Width


 


 14.5 %


(11.5-14.5) 


 





 


Platelet Count


 


 232 x10^3/uL


(140-400) 


 





 


Sodium Level


 


 136 mmol/L


(136-145) 


 





 


Potassium Level


 


 4.5 mmol/L


(3.5-5.1) 


 





 


Chloride Level


 


 101 mmol/L


() 


 





 


Carbon Dioxide Level


 


 31 mmol/L


(21-32) 


 





 


Anion Gap  4 (6-14)   


 


Blood Urea Nitrogen


 


 17 mg/dL


(7-20) 


 





 


Creatinine


 


 0.6 mg/dL


(0.6-1.0) 


 





 


Estimated GFR


(Cockcroft-Gault) 


 108.6 


 


 





 


BUN/Creatinine Ratio  28 (6-20)   


 


Glucose Level


 


 273 mg/dL


(70-99) 


 





 


Calcium Level


 


 8.0 mg/dL


(8.5-10.1) 


 





 


Total Bilirubin


 


 0.4 mg/dL


(0.2-1.0) 


 





 


Aspartate Amino Transf


(AST/SGOT) 


 99 U/L (15-37) 


 


 





 


Alanine Aminotransferase


(ALT/SGPT) 


 316 U/L


(14-59) 


 





 


Alkaline Phosphatase


 


 112 U/L


() 


 





 


Total Protein


 


 4.7 g/dL


(6.4-8.2) 


 





 


Albumin


 


 1.7 g/dL


(3.4-5.0) 


 





 


Albumin/Globulin Ratio  0.6 (1.0-1.7)   








Medications





Active Scripts








 Medications  Dose


 Route/Sig


 Max Daily Dose Days Date Category


 


 Cymbalta


  (Duloxetine Hcl)


 20 Mg Capsule.dr  20 Mg


 PO DAILY


   9/22/20 Reported


 


 Lisinopril 5 Mg


 Tablet  5 Mg


 PO DAILY


   9/22/20 Reported


 


 Amitriptyline Hcl


 10 Mg Tablet  10 Mg


 PO DAILY


   9/22/20 Reported


 


 Trulicity


  (Dulaglutide) 1.5


 Mg/0.5 Ml


 Pen.injctr  1.5 Mg


 SQ


   9/22/20 Reported


 


 Nesina


  (Alogliptin


 Benzoate) 12.5 Mg


 Tablet  12.5 Mg


 PO


   9/22/20 Reported


 


 Propranolol Hcl


 80 Mg Tablet  80 Mg


 PO DAILY


   5/14/14 Reported


 


 Cymbalta


  (Duloxetine Hcl)


 60 Mg Capsule.dr  60 Mg


 PO DAILY


   5/14/14 Reported








Comments


CXR 10/17 reviewed


1. ET tube terminates 3.7 cm above the hirne.


2. Lung volumes are lower and show increasing consolidation at the left 


lung base.-


 





CXR 10/19/20


IMPRESSION:


1. Endotracheal tube appears higher or more cephalad position compared to 


the prior study although positioning is somewhat different.


2. Persistent hazy infiltrates








CXR


 10/21


IMPRESSION:


1. Endotracheal tube appears somewhat high above the clavicles.


2. Infiltrates without change.





Impression


.


IMPRESSION:


1.  Acute hypoxemic respiratory failure.-- S/P intubation on 10/12/20 --impro

ving 


2.  COVID-19 viral pneumonia.


3.  Abnormal chest x-ray, possible gram-positive, gram-negative pneumonia.


4.  Headaches.


5.  Type 2 diabetes.


6.  Fibromyalgia.


7.  Morbid obesity.


8.  Nonalcoholic steatohepatitis.


9.  Obstructive sleep apnea.


10. Abnormal D-Dimer due to COVID, improving 10/15





Plan


.


Continue current support now on 7 of PEEP 50% FiO2, ABG and CXR reviewed, 

advance ET 2cm, no other changes 


pt. unable to tolerate sedation vacation 2/2 increased RR, PRN haldol and 

consider precedex, will try sedation vacation again in am 


S/P plasma, cont. full course of remdesivir 


Cont. steroids, with taper  


ABX per ID


Continue tube feeding for nutritional support


Follow clinical course 


DVT/GI PPX; protonix and BID lovenox 





D/W RN and RT 


Critical care time from 1000-1030AM, no overlap











LEWIS DICK MD              Oct 21, 2020 08:19

## 2020-10-21 NOTE — RAD
Exam: Chest one view

 

INDICATION: Verify endotracheal tube placement

 

TECHNIQUE: Frontal view of the chest

 

Comparisons: 10/21/2012

 

FINDINGS:

Endotracheal tube with tip approximately 5 cm above the hiren. Enteric 

tube traverses below the diaphragm distal extent not visualized likely 

within stomach.

 

Heart size is normal. Pulmonary vessels are obscured.

 

Hazy opacities lungs bilaterally. No pleural effusion.

 

IMPRESSION:

1.  Lines and tubes described above.

2.  Persistent patchy bilateral airspace disease.

 

Electronically signed by: Amador Milan MD (10/21/2020 9:04 PM) [email or web address]

## 2020-10-21 NOTE — PN
DATE:  10/21/2020



SUBJECTIVE:  The patient continues to be heavily sedated, intubated,

mechanically ventilated.  She is now maintaining her oxygen saturation at 95% on

FiO2 of 50%.



OBJECTIVE:

GENERAL:  On examining her, she was pale.  No jaundice, cyanosis or thyromegaly.

 No jugular venous distention or limb edema.

VITAL SIGNS:  Her heart rate was 68, blood pressure was 116/70, temperature 98,

respiratory rate 20, and oxygen saturation was 95% on FiO2 of 50%.

HEENT:  Showed normocephalic, atraumatic.

NECK:  Supple.

HEART:  Showed normal first and second heart sounds.  No gallop or murmur.

CHEST:  Showed central trachea, equal bilateral chest expansion, air entry,

vesicular sounds.  I could not appreciate any crepitation or rhonchi anteriorly.

ABDOMEN:  Distended, soft, nontender.

NEUROLOGIC:  She is heavily sedated.



Her intake was 3200, output was 1700.



LABORATORY WORK:  Still pending at the time of this dictation.  Her blood sugar

seems to be slightly better controlled and as of yesterday, her BUN was 20,

creatinine 0.7.  Her white cell count yesterday was 13,000, hemoglobin 10,

hematocrit 32, MCV 90 and platelet count 208,000.



ASSESSMENT:

1.  COVID-19 pneumonia.

2.  Acute hypoxic respiratory failure, requiring intubation and mechanical

ventilation.  She is now maintaining her oxygen saturation at 95% on FiO2 of

50%.

3.  She did spike temperature up to 101.7.  We did send blood for culture and

sensitivity.  She is currently on IV vancomycin and Zosyn; however, her blood

cultures are so far negative.

4.  The patient has multiple other medical problems including:

A.  Migraine headache.

B.  Type 2 diabetes mellitus.  Continued to be slightly better controlled.

C.  Fibromyalgia.

D.  Hypertension.

E.  Nephrolithiasis.

F.  Nonalcoholic steatohepatitis G.  Obstructive sleep apnea.



PLAN:  To obviously continue with mechanical ventilation and wean as tolerated. 

Continue with IV antibiotic.  I did discontinue the vancomycin.  Continue with

IV Solu-Medrol.  She also was treated with remdesivir and convalescent plasma. 

Her FiO2 is down to 50%.  Her liver enzymes are trending down.  She will

obviously continue with DVT prophylaxis.

 



______________________________

JULIO CISSE MD



DR:  Roselia  JOB#:  [id number] / [id number]

DD:  10/21/2020 07:46  DT:  10/21/2020 08:02

## 2020-10-21 NOTE — PDOC
Infectious Disease Note


Subjective


Subjective


intubated on vent





Vital Sign


Vital Signs





Vital Signs








  Date Time  Temp Pulse Resp B/P (MAP) Pulse Ox O2 Delivery O2 Flow Rate FiO2


 


10/21/20 06:00  68 20 116/70 (85) 95 Ventilator  


 


10/21/20 04:00 98.0       





 98.0       


 


10/20/20 10:05       50.0 











Physical Exam


PHYSICAL EXAM


GENERAL:  intubated on vent


HEENT:  Normocephalic, atraumatic.  Anicteric.


NECK:  Supple.  No thyromegaly.


LUNGS:  Coarse breath sounds.  No wheezing.


HEART:  S1, S2, no murmurs.


ABDOMEN:  Obese, soft, nontender, bowel sounds present.


EXTREMITIES:  No edema, no cyanosis.


DERMATOLOGIC:  Warm, dry.  No generalized rash.  Multiple tattoos.


CENTRAL NERVOUS SYSTEM:  sedated intubated





Labs


Lab





Laboratory Tests








Test


 10/20/20


08:30 10/20/20


11:46 10/20/20


17:36 10/21/20


00:05


 


O2 Saturation 93 % (92-99)    


 


Arterial Blood pH


 7.45


(7.35-7.45) 


 


 





 


Arterial Blood pCO2 at


Patient Temp 37 mmHg


(35-46) 


 


 





 


Arterial Blood pO2 at Patient


Temp 69 mmHg


() 


 


 





 


Arterial Blood HCO3


 25 mmol/L


(21-28) 


 


 





 


Arterial Blood Base Excess


 2 mmol/L


(-3-3) 


 


 





 


FiO2 50    


 


Glucose (Fingerstick)


 


 196 mg/dL


(70-99) 184 mg/dL


(70-99) 182 mg/dL


(70-99)


 


Test


 10/21/20


05:52 10/21/20


07:00 


 





 


Glucose (Fingerstick)


 254 mg/dL


(70-99) 


 


 





 


White Blood Count


 


 14.3 x10^3/uL


(4.0-11.0) 


 





 


Red Blood Count


 


 3.67 x10^6/uL


(3.50-5.40) 


 





 


Hemoglobin


 


 10.9 g/dL


(12.0-15.5) 


 





 


Hematocrit


 


 33.4 %


(36.0-47.0) 


 





 


Mean Corpuscular Volume  91 fL ()   


 


Mean Corpuscular Hemoglobin  30 pg (25-35)   


 


Mean Corpuscular Hemoglobin


Concent 


 33 g/dL


(31-37) 


 





 


Red Cell Distribution Width


 


 14.5 %


(11.5-14.5) 


 





 


Platelet Count


 


 232 x10^3/uL


(140-400) 


 





 


Sodium Level


 


 136 mmol/L


(136-145) 


 





 


Potassium Level


 


 4.5 mmol/L


(3.5-5.1) 


 





 


Chloride Level


 


 101 mmol/L


() 


 





 


Carbon Dioxide Level


 


 31 mmol/L


(21-32) 


 





 


Anion Gap  4 (6-14)   


 


Blood Urea Nitrogen


 


 17 mg/dL


(7-20) 


 





 


Creatinine


 


 0.6 mg/dL


(0.6-1.0) 


 





 


Estimated GFR


(Cockcroft-Gault) 


 108.6 


 


 





 


BUN/Creatinine Ratio  28 (6-20)   


 


Glucose Level


 


 273 mg/dL


(70-99) 


 





 


Calcium Level


 


 8.0 mg/dL


(8.5-10.1) 


 





 


Total Bilirubin


 


 0.4 mg/dL


(0.2-1.0) 


 





 


Aspartate Amino Transf


(AST/SGOT) 


 99 U/L (15-37) 


 


 





 


Alanine Aminotransferase


(ALT/SGPT) 


 316 U/L


(14-59) 


 





 


Alkaline Phosphatase


 


 112 U/L


() 


 





 


Total Protein


 


 4.7 g/dL


(6.4-8.2) 


 





 


Albumin


 


 1.7 g/dL


(3.4-5.0) 


 





 


Albumin/Globulin Ratio  0.6 (1.0-1.7)   








Micro





Microbiology


10/15/20 Blood Culture - Preliminary, Resulted


           NO GROWTH AFTER 1 DAY





Objective


Assessment


1.  Acute hypoxic respiratory failure.


2.  COVID-19 Pneumonia


3.  Migraine headaches.


4.  Diabetes.


5.  Obesity.


6.  Fibromyalgia.


7.  Abnormal LFTs.





Plan


Plan of Care


cont steroids


cont Remdesivir


supportive care


 zosyn


slowly improving











HEDY BRAND MD               Oct 21, 2020 08:22

## 2020-10-21 NOTE — NUR
Received call from Dr. Desai reporting pt's ET tube is "above the clavicles and needs to be 
advanced".  Notified Nasra [facility].  Nasra states that the ET tube "has been like that for 
days...will address with the doctor during rounds".  Currently, ET tube is 20 at center 
teeth.

## 2020-10-21 NOTE — RAD
AP chest.

 

HISTORY: Covid pneumonia

 

AP view was taken of the chest. NG tube extends into the stomach. Right 

central line extends to the left brachiocephalic vein without change. The 

endotracheal tube is not optimally visualized but appears somewhat high 

above the clavicles and may need to be repositioned. There are bilateral 

infiltrates with little change. Infiltrates most prominent in the left 

lower lobe.

 

The nurse in the ICU was notified of findings at the tracheal tube.

 

IMPRESSION:

1. Endotracheal tube appears somewhat high above the clavicles.

2. Infiltrates without change.

 

Electronically signed by: Lloyd Desai MD (10/21/2020 4:31 AM) [id number]

## 2020-10-21 NOTE — NUR
SS following up with discharge planning. SS reviewed pt chart and discussed with pt RN. Pt 
is currently on the vent at 50%. COVID19 positive. Pt accepted at [facility], 
[phone]; fax [phone], pending insurance authorization. Discussed with physician 
and not medically stable for discharge at this time. SS will continue to follow for 
discharge planning.

## 2020-10-21 NOTE — NUR
0825 All sedation turned off per Dr. Melgar's verbal orders. 0925 Patient fully awake and 
tearful. Not tolerating sedation off. Resp rate 46/min, patient tearful, pulse ox down to 
87-88%. Informed Dr. Melgar. Propofol back on and new Haldol order received

## 2020-10-22 VITALS — DIASTOLIC BLOOD PRESSURE: 51 MMHG | SYSTOLIC BLOOD PRESSURE: 98 MMHG

## 2020-10-22 VITALS — SYSTOLIC BLOOD PRESSURE: 121 MMHG | DIASTOLIC BLOOD PRESSURE: 67 MMHG

## 2020-10-22 VITALS — SYSTOLIC BLOOD PRESSURE: 114 MMHG | DIASTOLIC BLOOD PRESSURE: 58 MMHG

## 2020-10-22 VITALS — SYSTOLIC BLOOD PRESSURE: 126 MMHG | DIASTOLIC BLOOD PRESSURE: 79 MMHG

## 2020-10-22 VITALS — SYSTOLIC BLOOD PRESSURE: 103 MMHG | DIASTOLIC BLOOD PRESSURE: 58 MMHG

## 2020-10-22 VITALS — SYSTOLIC BLOOD PRESSURE: 91 MMHG | DIASTOLIC BLOOD PRESSURE: 49 MMHG

## 2020-10-22 VITALS — DIASTOLIC BLOOD PRESSURE: 70 MMHG | SYSTOLIC BLOOD PRESSURE: 132 MMHG

## 2020-10-22 VITALS — DIASTOLIC BLOOD PRESSURE: 51 MMHG | SYSTOLIC BLOOD PRESSURE: 149 MMHG

## 2020-10-22 VITALS — DIASTOLIC BLOOD PRESSURE: 67 MMHG | SYSTOLIC BLOOD PRESSURE: 125 MMHG

## 2020-10-22 VITALS — SYSTOLIC BLOOD PRESSURE: 101 MMHG | DIASTOLIC BLOOD PRESSURE: 52 MMHG

## 2020-10-22 VITALS — DIASTOLIC BLOOD PRESSURE: 61 MMHG | SYSTOLIC BLOOD PRESSURE: 108 MMHG

## 2020-10-22 VITALS — DIASTOLIC BLOOD PRESSURE: 58 MMHG | SYSTOLIC BLOOD PRESSURE: 101 MMHG

## 2020-10-22 VITALS — DIASTOLIC BLOOD PRESSURE: 93 MMHG | SYSTOLIC BLOOD PRESSURE: 150 MMHG

## 2020-10-22 VITALS — SYSTOLIC BLOOD PRESSURE: 100 MMHG | DIASTOLIC BLOOD PRESSURE: 51 MMHG

## 2020-10-22 VITALS — SYSTOLIC BLOOD PRESSURE: 103 MMHG | DIASTOLIC BLOOD PRESSURE: 49 MMHG

## 2020-10-22 VITALS — SYSTOLIC BLOOD PRESSURE: 113 MMHG | DIASTOLIC BLOOD PRESSURE: 66 MMHG

## 2020-10-22 VITALS — DIASTOLIC BLOOD PRESSURE: 75 MMHG | SYSTOLIC BLOOD PRESSURE: 120 MMHG

## 2020-10-22 VITALS — SYSTOLIC BLOOD PRESSURE: 128 MMHG | DIASTOLIC BLOOD PRESSURE: 67 MMHG

## 2020-10-22 VITALS — DIASTOLIC BLOOD PRESSURE: 68 MMHG | SYSTOLIC BLOOD PRESSURE: 119 MMHG

## 2020-10-22 VITALS — SYSTOLIC BLOOD PRESSURE: 128 MMHG | DIASTOLIC BLOOD PRESSURE: 74 MMHG

## 2020-10-22 VITALS — DIASTOLIC BLOOD PRESSURE: 61 MMHG | SYSTOLIC BLOOD PRESSURE: 107 MMHG

## 2020-10-22 VITALS — SYSTOLIC BLOOD PRESSURE: 130 MMHG | DIASTOLIC BLOOD PRESSURE: 79 MMHG

## 2020-10-22 VITALS — DIASTOLIC BLOOD PRESSURE: 74 MMHG | SYSTOLIC BLOOD PRESSURE: 120 MMHG

## 2020-10-22 LAB
ALBUMIN SERPL-MCNC: 1.8 G/DL (ref 3.4–5)
ALBUMIN/GLOB SERPL: 0.6 {RATIO} (ref 1–1.7)
ALP SERPL-CCNC: 118 U/L (ref 46–116)
ALT SERPL-CCNC: 330 U/L (ref 14–59)
ANION GAP SERPL CALC-SCNC: 5 MMOL/L (ref 6–14)
AST SERPL-CCNC: 104 U/L (ref 15–37)
BASE EXCESS ABG: 5 MMOL/L (ref -3–3)
BILIRUB SERPL-MCNC: 0.3 MG/DL (ref 0.2–1)
BUN SERPL-MCNC: 17 MG/DL (ref 7–20)
BUN/CREAT SERPL: 28 (ref 6–20)
CALCIUM SERPL-MCNC: 8.3 MG/DL (ref 8.5–10.1)
CHLORIDE SERPL-SCNC: 100 MMOL/L (ref 98–107)
CO2 SERPL-SCNC: 31 MMOL/L (ref 21–32)
CREAT SERPL-MCNC: 0.6 MG/DL (ref 0.6–1)
ERYTHROCYTE [DISTWIDTH] IN BLOOD BY AUTOMATED COUNT: 14.5 % (ref 11.5–14.5)
GFR SERPLBLD BASED ON 1.73 SQ M-ARVRAT: 108.6 ML/MIN
GLUCOSE SERPL-MCNC: 185 MG/DL (ref 70–99)
HCO3 BLDA-SCNC: 29 MMOL/L (ref 21–28)
HCT VFR BLD CALC: 33.6 % (ref 36–47)
HGB BLD-MCNC: 10.8 G/DL (ref 12–15.5)
INSPIRATION SETTING TIME VENT: (no result)
MCH RBC QN AUTO: 29 PG (ref 25–35)
MCHC RBC AUTO-ENTMCNC: 32 G/DL (ref 31–37)
MCV RBC AUTO: 90 FL (ref 79–100)
PCO2 BLDA: 38 MMHG (ref 35–46)
PLATELET # BLD AUTO: 265 X10^3/UL (ref 140–400)
PO2 BLDA: 69 MMHG (ref 75–108)
POTASSIUM SERPL-SCNC: 4.6 MMOL/L (ref 3.5–5.1)
PROT SERPL-MCNC: 4.9 G/DL (ref 6.4–8.2)
RBC # BLD AUTO: 3.72 X10^6/UL (ref 3.5–5.4)
SAO2 % BLDA: 93 % (ref 92–99)
SODIUM SERPL-SCNC: 136 MMOL/L (ref 136–145)
WBC # BLD AUTO: 16.9 X10^3/UL (ref 4–11)

## 2020-10-22 RX ADMIN — METHYLPREDNISOLONE SODIUM SUCCINATE SCH MG: 40 INJECTION, POWDER, FOR SOLUTION INTRAMUSCULAR; INTRAVENOUS at 14:46

## 2020-10-22 RX ADMIN — PIPERACILLIN SODIUM AND TAZOBACTAM SODIUM SCH MLS/HR: 3; .375 INJECTION, POWDER, LYOPHILIZED, FOR SOLUTION INTRAVENOUS at 05:59

## 2020-10-22 RX ADMIN — ENOXAPARIN SODIUM SCH MG: 40 INJECTION SUBCUTANEOUS at 08:21

## 2020-10-22 RX ADMIN — HALOPERIDOL LACTATE SCH MG: 5 INJECTION, SOLUTION INTRAMUSCULAR at 14:46

## 2020-10-22 RX ADMIN — PANTOPRAZOLE SODIUM SCH MG: 40 INJECTION, POWDER, FOR SOLUTION INTRAVENOUS at 08:21

## 2020-10-22 RX ADMIN — INSULIN GLARGINE SCH UNIT: 100 INJECTION, SOLUTION SUBCUTANEOUS at 09:15

## 2020-10-22 RX ADMIN — INSULIN LISPRO SCH UNITS: 100 INJECTION, SOLUTION INTRAVENOUS; SUBCUTANEOUS at 23:46

## 2020-10-22 RX ADMIN — MIDAZOLAM PRN MLS/HR: 5 INJECTION, SOLUTION INTRAMUSCULAR; INTRAVENOUS at 22:59

## 2020-10-22 RX ADMIN — INSULIN LISPRO SCH UNITS: 100 INJECTION, SOLUTION INTRAVENOUS; SUBCUTANEOUS at 17:31

## 2020-10-22 RX ADMIN — INSULIN LISPRO SCH UNITS: 100 INJECTION, SOLUTION INTRAVENOUS; SUBCUTANEOUS at 11:23

## 2020-10-22 RX ADMIN — PROPOFOL PRN MLS/HR: 10 INJECTION, EMULSION INTRAVENOUS at 03:46

## 2020-10-22 RX ADMIN — Medication PRN MLS/HR: at 13:14

## 2020-10-22 RX ADMIN — INSULIN LISPRO SCH UNITS: 100 INJECTION, SOLUTION INTRAVENOUS; SUBCUTANEOUS at 11:24

## 2020-10-22 RX ADMIN — INSULIN LISPRO SCH UNITS: 100 INJECTION, SOLUTION INTRAVENOUS; SUBCUTANEOUS at 00:16

## 2020-10-22 RX ADMIN — INSULIN LISPRO SCH UNITS: 100 INJECTION, SOLUTION INTRAVENOUS; SUBCUTANEOUS at 06:05

## 2020-10-22 RX ADMIN — INSULIN LISPRO SCH UNITS: 100 INJECTION, SOLUTION INTRAVENOUS; SUBCUTANEOUS at 23:45

## 2020-10-22 RX ADMIN — HALOPERIDOL LACTATE SCH MG: 5 INJECTION, SOLUTION INTRAMUSCULAR at 21:30

## 2020-10-22 RX ADMIN — MIDAZOLAM PRN MLS/HR: 5 INJECTION, SOLUTION INTRAMUSCULAR; INTRAVENOUS at 07:14

## 2020-10-22 RX ADMIN — ENOXAPARIN SODIUM SCH MG: 40 INJECTION SUBCUTANEOUS at 21:22

## 2020-10-22 RX ADMIN — METHYLPREDNISOLONE SODIUM SUCCINATE SCH MG: 40 INJECTION, POWDER, FOR SOLUTION INTRAMUSCULAR; INTRAVENOUS at 05:59

## 2020-10-22 RX ADMIN — LINAGLIPTIN SCH MG: 5 TABLET, FILM COATED ORAL at 08:21

## 2020-10-22 RX ADMIN — PIPERACILLIN SODIUM AND TAZOBACTAM SODIUM SCH MLS/HR: 3; .375 INJECTION, POWDER, LYOPHILIZED, FOR SOLUTION INTRAVENOUS at 00:11

## 2020-10-22 RX ADMIN — INSULIN GLARGINE SCH UNIT: 100 INJECTION, SOLUTION SUBCUTANEOUS at 21:22

## 2020-10-22 RX ADMIN — PROPOFOL PRN MLS/HR: 10 INJECTION, EMULSION INTRAVENOUS at 22:42

## 2020-10-22 RX ADMIN — PIPERACILLIN SODIUM AND TAZOBACTAM SODIUM SCH MLS/HR: 3; .375 INJECTION, POWDER, LYOPHILIZED, FOR SOLUTION INTRAVENOUS at 11:26

## 2020-10-22 RX ADMIN — PROPOFOL PRN MLS/HR: 10 INJECTION, EMULSION INTRAVENOUS at 14:46

## 2020-10-22 RX ADMIN — LISINOPRIL SCH MG: 5 TABLET ORAL at 08:22

## 2020-10-22 RX ADMIN — METHYLPREDNISOLONE SODIUM SUCCINATE SCH MG: 40 INJECTION, POWDER, FOR SOLUTION INTRAMUSCULAR; INTRAVENOUS at 21:30

## 2020-10-22 RX ADMIN — INSULIN LISPRO SCH UNITS: 100 INJECTION, SOLUTION INTRAVENOUS; SUBCUTANEOUS at 00:14

## 2020-10-22 RX ADMIN — PIPERACILLIN SODIUM AND TAZOBACTAM SODIUM SCH MLS/HR: 3; .375 INJECTION, POWDER, LYOPHILIZED, FOR SOLUTION INTRAVENOUS at 17:30

## 2020-10-22 RX ADMIN — PIPERACILLIN SODIUM AND TAZOBACTAM SODIUM SCH MLS/HR: 3; .375 INJECTION, POWDER, LYOPHILIZED, FOR SOLUTION INTRAVENOUS at 23:45

## 2020-10-22 RX ADMIN — HALOPERIDOL LACTATE SCH MG: 5 INJECTION, SOLUTION INTRAMUSCULAR at 05:59

## 2020-10-22 NOTE — PN
DATE:  10/22/2020



SUBJECTIVE:  The patient is resting, slightly propped up in bed, continued to be

heavily sedated, intubated and mechanically ventilated.  She is now maintaining

her oxygen saturation at 96% on FiO2 of 50% and nursing staff did not voice any

concerns that she had an uneventful night.



PHYSICAL EXAMINATION:

GENERAL:  When I examined her, no jaundice, cyanosis or thyromegaly.  No jugular

venous distention.  No lower limb edema.

VITAL SIGNS:  Her heart rate was 84, blood pressure was 150/93, temperature was

98, respiratory rate was 20, and oxygen saturation was 96%.

HEAD, EYES, EARS, NOSE AND THROAT:  Showed normocephalic, atraumatic.

NECK:  Supple.

CARDIAC:  Normal first and second heart sounds.  No gallop or murmur.

CHEST:  Clear to auscultation.  No crepitation or rhonchi.

ABDOMEN:  Distended, soft, nontender.

NEUROLOGIC:  She is heavily sedated.



Her intake over the last 24 hours was 2900, output was 4700.



LABORATORY DATA:  Her lab work this morning showed a white cell count of 16,900,

hemoglobin 11, hematocrit 33, MCV 90, and platelet count of 265,000.  Her

chemistry showed a serum sodium 136, potassium 4.6, chloride 100, bicarbonate

31, anion gap of 5, BUN 17, creatinine 0.6, estimated GFR was 180 mL per minute.

 Her glucose was 185, calcium was 8.3.  Total bilirubin is normal.  AST, ALT,

alkaline phosphatase slightly elevated.  Total protein 4.9, albumin was 1.8. 

Her blood gases this morning showed a pH of 7.49, pCO2 of 38, pO2 of 69,

bicarbonate 29, oxygen saturation 93% on FiO2 of 50%.



ASSESSMENT:

1.  COVID-19 pneumonia.

2.  Acute hypoxic respiratory failure requiring intubation and mechanical

ventilation.  She is now maintaining her oxygen saturations at 96% on FiO2 of

50%.

3.  She did spike a temperature up to 101.7.  We did send blood for culture and

sensitivity.  She is currently on Zosyn; however, her blood cultures are

negative and her vancomycin was discontinued.

4.  The patient has multiple other medical problems including:

A.  Migraine headache.

B.  Type 2 diabetes mellitus, better controlled.

C.  Fibromyalgia.

D.  Hypertension.

E.  Nephrolithiasis.

F.  Nonalcoholic steatohepatitis.

G.  Obstructive sleep apnea.



PLAN:

1.  To continue with mechanical ventilation, wean as tolerated.

2.  Continue with IV antibiotic.  I did discontinue her vancomycin.  Continue

with steroids.  Continue with DVT prophylaxis.

 



______________________________

JULIO CISSE MD



DR:  GOKUL/glo  JOB#:  [id number] / [id number]

DD:  10/22/2020 10:34  DT:  10/22/2020 10:47

## 2020-10-22 NOTE — PDOC
PULMONARY PROGRESS NOTES


DATE: 10/22/20 


TIME: 11:05


Subjective


Patient was intubated on 10/12/20 


AC control, sedated.


afebrile 


no overnight concerns from nursing


Vitals





Vital Signs








  Date Time  Temp Pulse Resp B/P (MAP) Pulse Ox O2 Delivery O2 Flow Rate FiO2


 


10/22/20 11:00  70 20 108/61 (77) 92 Ventilator  


 


10/22/20 08:00 98.0       





 98.0       








Comments


Pt. is seen during covid 19 pandemic, visual exam preformed 


on vent sedated


no accessory muscle use


RRR


no edema or rash noted


Labs





Laboratory Tests








Test


 10/20/20


11:46 10/20/20


17:36 10/21/20


00:05 10/21/20


05:52


 


Glucose (Fingerstick)


 196 mg/dL


(70-99) 184 mg/dL


(70-99) 182 mg/dL


(70-99) 254 mg/dL


(70-99)


 


Test


 10/21/20


07:00 10/21/20


08:05 10/21/20


11:51 10/22/20


00:05


 


White Blood Count


 14.3 x10^3/uL


(4.0-11.0) 


 


 





 


Red Blood Count


 3.67 x10^6/uL


(3.50-5.40) 


 


 





 


Hemoglobin


 10.9 g/dL


(12.0-15.5) 


 


 





 


Hematocrit


 33.4 %


(36.0-47.0) 


 


 





 


Mean Corpuscular Volume 91 fL ()    


 


Mean Corpuscular Hemoglobin 30 pg (25-35)    


 


Mean Corpuscular Hemoglobin


Concent 33 g/dL


(31-37) 


 


 





 


Red Cell Distribution Width


 14.5 %


(11.5-14.5) 


 


 





 


Platelet Count


 232 x10^3/uL


(140-400) 


 


 





 


Sodium Level


 136 mmol/L


(136-145) 


 


 





 


Potassium Level


 4.5 mmol/L


(3.5-5.1) 


 


 





 


Chloride Level


 101 mmol/L


() 


 


 





 


Carbon Dioxide Level


 31 mmol/L


(21-32) 


 


 





 


Anion Gap 4 (6-14)    


 


Blood Urea Nitrogen


 17 mg/dL


(7-20) 


 


 





 


Creatinine


 0.6 mg/dL


(0.6-1.0) 


 


 





 


Estimated GFR


(Cockcroft-Gault) 108.6 


 


 


 





 


BUN/Creatinine Ratio 28 (6-20)    


 


Glucose Level


 273 mg/dL


(70-99) 


 


 





 


Calcium Level


 8.0 mg/dL


(8.5-10.1) 


 


 





 


Total Bilirubin


 0.4 mg/dL


(0.2-1.0) 


 


 





 


Aspartate Amino Transf


(AST/SGOT) 99 U/L (15-37) 


 


 


 





 


Alanine Aminotransferase


(ALT/SGPT) 316 U/L


(14-59) 


 


 





 


Alkaline Phosphatase


 112 U/L


() 


 


 





 


Total Protein


 4.7 g/dL


(6.4-8.2) 


 


 





 


Albumin


 1.7 g/dL


(3.4-5.0) 


 


 





 


Albumin/Globulin Ratio 0.6 (1.0-1.7)    


 


O2 Saturation  94 % (92-99)   


 


Arterial Blood pH


 


 7.41


(7.35-7.45) 


 





 


Arterial Blood pCO2 at


Patient Temp 


 44 mmHg


(35-46) 


 





 


Arterial Blood pO2 at Patient


Temp 


 74 mmHg


() 


 





 


Arterial Blood HCO3


 


 27 mmol/L


(21-28) 


 





 


Arterial Blood Base Excess


 


 2 mmol/L


(-3-3) 


 





 


FiO2  50   


 


Glucose (Fingerstick)


 


 


 210 mg/dL


(70-99) 212 mg/dL


(70-99)


 


Test


 10/22/20


06:00 10/22/20


07:30 


 





 


White Blood Count


 16.9 x10^3/uL


(4.0-11.0) 


 


 





 


Red Blood Count


 3.72 x10^6/uL


(3.50-5.40) 


 


 





 


Hemoglobin


 10.8 g/dL


(12.0-15.5) 


 


 





 


Hematocrit


 33.6 %


(36.0-47.0) 


 


 





 


Mean Corpuscular Volume 90 fL ()    


 


Mean Corpuscular Hemoglobin 29 pg (25-35)    


 


Mean Corpuscular Hemoglobin


Concent 32 g/dL


(31-37) 


 


 





 


Red Cell Distribution Width


 14.5 %


(11.5-14.5) 


 


 





 


Platelet Count


 265 x10^3/uL


(140-400) 


 


 





 


Sodium Level


 136 mmol/L


(136-145) 


 


 





 


Potassium Level


 4.6 mmol/L


(3.5-5.1) 


 


 





 


Chloride Level


 100 mmol/L


() 


 


 





 


Carbon Dioxide Level


 31 mmol/L


(21-32) 


 


 





 


Anion Gap 5 (6-14)    


 


Blood Urea Nitrogen


 17 mg/dL


(7-20) 


 


 





 


Creatinine


 0.6 mg/dL


(0.6-1.0) 


 


 





 


Estimated GFR


(Cockcroft-Gault) 108.6 


 


 


 





 


BUN/Creatinine Ratio 28 (6-20)    


 


Glucose Level


 185 mg/dL


(70-99) 


 


 





 


Glucose (Fingerstick)


 183 mg/dL


(70-99) 


 


 





 


Calcium Level


 8.3 mg/dL


(8.5-10.1) 


 


 





 


Total Bilirubin


 0.3 mg/dL


(0.2-1.0) 


 


 





 


Aspartate Amino Transf


(AST/SGOT) 104 U/L


(15-37) 


 


 





 


Alanine Aminotransferase


(ALT/SGPT) 330 U/L


(14-59) 


 


 





 


Alkaline Phosphatase


 118 U/L


() 


 


 





 


Total Protein


 4.9 g/dL


(6.4-8.2) 


 


 





 


Albumin


 1.8 g/dL


(3.4-5.0) 


 


 





 


Albumin/Globulin Ratio 0.6 (1.0-1.7)    


 


O2 Saturation  93 % (92-99)   


 


Arterial Blood pH


 


 7.49


(7.35-7.45) 


 





 


Arterial Blood pCO2 at


Patient Temp 


 38 mmHg


(35-46) 


 





 


Arterial Blood pO2 at Patient


Temp 


 69 mmHg


() 


 





 


Arterial Blood HCO3


 


 29 mmol/L


(21-28) 


 





 


Arterial Blood Base Excess


 


 5 mmol/L


(-3-3) 


 





 


FiO2  50/vent   








Laboratory Tests








Test


 10/21/20


11:51 10/22/20


00:05 10/22/20


06:00 10/22/20


07:30


 


Glucose (Fingerstick)


 210 mg/dL


(70-99) 212 mg/dL


(70-99) 183 mg/dL


(70-99) 





 


White Blood Count


 


 


 16.9 x10^3/uL


(4.0-11.0) 





 


Red Blood Count


 


 


 3.72 x10^6/uL


(3.50-5.40) 





 


Hemoglobin


 


 


 10.8 g/dL


(12.0-15.5) 





 


Hematocrit


 


 


 33.6 %


(36.0-47.0) 





 


Mean Corpuscular Volume   90 fL ()  


 


Mean Corpuscular Hemoglobin   29 pg (25-35)  


 


Mean Corpuscular Hemoglobin


Concent 


 


 32 g/dL


(31-37) 





 


Red Cell Distribution Width


 


 


 14.5 %


(11.5-14.5) 





 


Platelet Count


 


 


 265 x10^3/uL


(140-400) 





 


Sodium Level


 


 


 136 mmol/L


(136-145) 





 


Potassium Level


 


 


 4.6 mmol/L


(3.5-5.1) 





 


Chloride Level


 


 


 100 mmol/L


() 





 


Carbon Dioxide Level


 


 


 31 mmol/L


(21-32) 





 


Anion Gap   5 (6-14)  


 


Blood Urea Nitrogen


 


 


 17 mg/dL


(7-20) 





 


Creatinine


 


 


 0.6 mg/dL


(0.6-1.0) 





 


Estimated GFR


(Cockcroft-Gault) 


 


 108.6 


 





 


BUN/Creatinine Ratio   28 (6-20)  


 


Glucose Level


 


 


 185 mg/dL


(70-99) 





 


Calcium Level


 


 


 8.3 mg/dL


(8.5-10.1) 





 


Total Bilirubin


 


 


 0.3 mg/dL


(0.2-1.0) 





 


Aspartate Amino Transf


(AST/SGOT) 


 


 104 U/L


(15-37) 





 


Alanine Aminotransferase


(ALT/SGPT) 


 


 330 U/L


(14-59) 





 


Alkaline Phosphatase


 


 


 118 U/L


() 





 


Total Protein


 


 


 4.9 g/dL


(6.4-8.2) 





 


Albumin


 


 


 1.8 g/dL


(3.4-5.0) 





 


Albumin/Globulin Ratio   0.6 (1.0-1.7)  


 


O2 Saturation    93 % (92-99) 


 


Arterial Blood pH


 


 


 


 7.49


(7.35-7.45)


 


Arterial Blood pCO2 at


Patient Temp 


 


 


 38 mmHg


(35-46)


 


Arterial Blood pO2 at Patient


Temp 


 


 


 69 mmHg


()


 


Arterial Blood HCO3


 


 


 


 29 mmol/L


(21-28)


 


Arterial Blood Base Excess


 


 


 


 5 mmol/L


(-3-3)


 


FiO2    50/vent 








Medications





Active Scripts








 Medications  Dose


 Route/Sig


 Max Daily Dose Days Date Category


 


 Cymbalta


  (Duloxetine Hcl)


 20 Mg Capsule.dr  20 Mg


 PO DAILY


   9/22/20 Reported


 


 Lisinopril 5 Mg


 Tablet  5 Mg


 PO DAILY


   9/22/20 Reported


 


 Amitriptyline Hcl


 10 Mg Tablet  10 Mg


 PO DAILY


   9/22/20 Reported


 


 Trulicity


  (Dulaglutide) 1.5


 Mg/0.5 Ml


 Pen.injctr  1.5 Mg


 SQ


   9/22/20 Reported


 


 Nesina


  (Alogliptin


 Benzoate) 12.5 Mg


 Tablet  12.5 Mg


 PO


   9/22/20 Reported


 


 Propranolol Hcl


 80 Mg Tablet  80 Mg


 PO DAILY


   5/14/14 Reported


 


 Cymbalta


  (Duloxetine Hcl)


 60 Mg Capsule.dr  60 Mg


 PO DAILY


   5/14/14 Reported








Comments


CXR 10/17 reviewed


1. ET tube terminates 3.7 cm above the hiren.


2. Lung volumes are lower and show increasing consolidation at the left 


lung base.-


 





CXR 10/19/20


IMPRESSION:


1. Endotracheal tube appears higher or more cephalad position compared to 


the prior study although positioning is somewhat different.


2. Persistent hazy infiltrates








CXR


 10/21


IMPRESSION:


1. Endotracheal tube appears somewhat high above the clavicles.


2. Infiltrates without change.





Impression


.


IMPRESSION:


1.  Acute hypoxemic respiratory failure.-- S/P intubation on 10/12/20 

--improving 


2.  COVID-19 viral pneumonia.


3.  Abnormal chest x-ray, possible gram-positive, gram-negative pneumonia.


4.  Headaches.


5.  Type 2 diabetes.


6.  Fibromyalgia.


7.  Morbid obesity.


8.  Nonalcoholic steatohepatitis.


9.  Obstructive sleep apnea.


10. Abnormal D-Dimer due to COVID, improving 10/15





Plan


.


Continue current support now on 5 of PEEP 50% FiO2, ABG reviewed no further 

changes


Patient was unable to tolerate CPAP trial yesterday we will plan for CPAP trial 

again today if unable to be extubated we will plan for tracheostomy Monday or 

Tuesday will consult surgery at this time


Follow chest x-ray


PRN haldol and consider precedex, 


S/P plasma, cont. full course of remdesivir 


Cont. steroids, with taper  


ABX per ID--Zosyn


Continue tube feeding for nutritional support


Follow clinical course 


DVT/GI PPX; protonix and BID lovenox 





D/W RN and RT 


Critical care time from 9565-0581 AM, no overlap











LEWIS DICK MD              Oct 22, 2020 11:07

## 2020-10-22 NOTE — NUR
SS following up with discharge planning. SS reviewed pt chart and discussed with pt RN. Pt 
remains on the vent at this time at 50%. COVID19 positive. Pt on IV Zosyn and steroids. Pt 
accepted at [facility], [phone]; fax [phone], pending insurance 
approval. SS phoned and faxed clinical updates to [facility]. SS will continue to follow for 
discharge planning.

## 2020-10-22 NOTE — PDOC
Infectious Disease Note


Subjective


Subjective


intubated on vent





ROS


ROS


No nausea vomiting diarrhea.  Oxygenation is improving slowly





Vital Sign


Vital Signs





Vital Signs








  Date Time  Temp Pulse Resp B/P (MAP) Pulse Ox O2 Delivery O2 Flow Rate FiO2


 


10/22/20 07:10     93 Ventilator  


 


10/22/20 07:00  66 20 103/58 (73)    


 


10/22/20 05:00 98.1       





 98.1       











Physical Exam


PHYSICAL EXAM


GENERAL:  intubated on vent


HEENT:  Normocephalic, atraumatic.  Anicteric.


NECK:  Supple.  No thyromegaly.


LUNGS:  Coarse breath sounds.  No wheezing.


HEART:  S1, S2, no murmurs.


ABDOMEN:  Obese, soft, nontender, bowel sounds present.


EXTREMITIES:  No edema, no cyanosis.


DERMATOLOGIC:  Warm, dry.  No generalized rash.  Multiple tattoos.


CENTRAL NERVOUS SYSTEM:  sedated intubated





Labs


Lab





Laboratory Tests








Test


 10/21/20


08:05 10/21/20


11:51 10/22/20


00:05 10/22/20


06:00


 


O2 Saturation 94 % (92-99)    


 


Arterial Blood pH


 7.41


(7.35-7.45) 


 


 





 


Arterial Blood pCO2 at


Patient Temp 44 mmHg


(35-46) 


 


 





 


Arterial Blood pO2 at Patient


Temp 74 mmHg


() 


 


 





 


Arterial Blood HCO3


 27 mmol/L


(21-28) 


 


 





 


Arterial Blood Base Excess


 2 mmol/L


(-3-3) 


 


 





 


FiO2 50    


 


Glucose (Fingerstick)


 


 210 mg/dL


(70-99) 212 mg/dL


(70-99) 183 mg/dL


(70-99)


 


White Blood Count


 


 


 


 16.9 x10^3/uL


(4.0-11.0)


 


Red Blood Count


 


 


 


 3.72 x10^6/uL


(3.50-5.40)


 


Hemoglobin


 


 


 


 10.8 g/dL


(12.0-15.5)


 


Hematocrit


 


 


 


 33.6 %


(36.0-47.0)


 


Mean Corpuscular Volume    90 fL () 


 


Mean Corpuscular Hemoglobin    29 pg (25-35) 


 


Mean Corpuscular Hemoglobin


Concent 


 


 


 32 g/dL


(31-37)


 


Red Cell Distribution Width


 


 


 


 14.5 %


(11.5-14.5)


 


Platelet Count


 


 


 


 265 x10^3/uL


(140-400)


 


Sodium Level


 


 


 


 136 mmol/L


(136-145)


 


Potassium Level


 


 


 


 4.6 mmol/L


(3.5-5.1)


 


Chloride Level


 


 


 


 100 mmol/L


()


 


Carbon Dioxide Level


 


 


 


 31 mmol/L


(21-32)


 


Anion Gap    5 (6-14) 


 


Blood Urea Nitrogen


 


 


 


 17 mg/dL


(7-20)


 


Creatinine


 


 


 


 0.6 mg/dL


(0.6-1.0)


 


Estimated GFR


(Cockcroft-Gault) 


 


 


 108.6 





 


BUN/Creatinine Ratio    28 (6-20) 


 


Glucose Level


 


 


 


 185 mg/dL


(70-99)


 


Calcium Level


 


 


 


 8.3 mg/dL


(8.5-10.1)


 


Total Bilirubin


 


 


 


 0.3 mg/dL


(0.2-1.0)


 


Aspartate Amino Transf


(AST/SGOT) 


 


 


 104 U/L


(15-37)


 


Alanine Aminotransferase


(ALT/SGPT) 


 


 


 330 U/L


(14-59)


 


Alkaline Phosphatase


 


 


 


 118 U/L


()


 


Total Protein


 


 


 


 4.9 g/dL


(6.4-8.2)


 


Albumin


 


 


 


 1.8 g/dL


(3.4-5.0)


 


Albumin/Globulin Ratio    0.6 (1.0-1.7) 








Micro





Microbiology


10/15/20 Blood Culture - Preliminary, Resulted


           NO GROWTH AFTER 1 DAY





Objective


Assessment


1.  Acute hypoxic respiratory failure.


2.  COVID-19 Pneumonia


3.  Migraine headaches.


4.  Diabetes.


5.  Obesity.


6.  Fibromyalgia.


7.  Abnormal LFTs.





Plan


Plan of Care


cont steroids


cont Remdesivir


supportive care


 zosyn


slowly improving











HEDY BRAND MD               Oct 22, 2020 07:36

## 2020-10-22 NOTE — NUR
Have reviewed and agree with documentation completed by dietetic intern and 
have made changes as needed/appropriate

## 2020-10-22 NOTE — PDOC2
CONSULT


Date of Consult


Date of Consult


DATE: 10/22/20 


TIME: 19:43





Reason for Consult


Reason for Consult:


Respiratory failure





Referring Physician


Referring Physician:


Dr. Barnes





Identification/Chief Complaint


Chief Complaint


none





Source


Source:  Chart review





History of Present Illness


Reason for Visit:


46 yo F with respiratory failure.  She is attempting to be weaned.  Consultation

is requested if wean is not accomplished.  She is intubated and non responsive. 

No family present currently.





Past Medical History


CENTRAL NERVOUS SYSTEM:  Migraine, Other (fibromyalgia)





Past Surgical History


Past Surgical History:  Cholecystectomy, Tubal Ligation





Family History


Family History:  Family History Unknown





Social History


No





Current Medications


Current Medications





Current Medications


Amitriptyline HCl (Elavil) 75 mg PRN QHS  PRN PO INSOMNIA;  Start 10/10/20 at 

16:15


Acetaminophen/ Aspirin/Caffeine (Excedrin Migraine) 2 tab PRN Q6HRS  PRN PO 

MIGRAINE HEADACHE;  Start 10/10/20 at 16:15


Dextrose (Dextrose 50%-Water Syringe) 12.5 gm PRN Q15MIN  ONCE IV ;  Start 

10/10/20 at 16:15;  Stop 10/10/20 at 16:22;  Status DC


Duloxetine HCl (Cymbalta) 60 mg BID PO  Last administered on 10/22/20at 08:22;  

Start 10/10/20 at 21:00


Acetaminophen/ Hydrocodone Bitart (Lortab 5/325) 1 tab PRN Q6HRS  PRN PO 

MODERATE-SEVERE PAIN Last administered on 10/12/20at 13:54;  Start 10/10/20 at 

16:15


Insulin Glargine (Lantus Syringe) 22 unit QHS SQ  Last administered on 

10/13/20at 20:54;  Start 10/10/20 at 21:00;  Stop 10/14/20 at 08:41;  Status DC


Ketorolac Tromethamine (Toradol 30mg Vial) 30 mg PRN Q6HRS  PRN IVP 

INFLAMMATION;  Start 10/10/20 at 16:15;  Stop 10/15/20 at 16:14;  Status DC


Linagliptin (Tradjenta) 5 mg DAILY PO  Last administered on 10/22/20at 08:21;  

Start 10/10/20 at 17:00


Lisinopril (Prinivil) 5 mg DAILY PO  Last administered on 10/22/20at 08:22;  

Start 10/10/20 at 17:00


Ondansetron HCl (Zofran) 4 mg PRN Q4HRS  PRN IVP NAUSEA/VOMITING Last 

administered on 10/11/20at 11:23;  Start 10/10/20 at 16:15


Verapamil HCl (Calan Sr) 120 mg BID PO  Last administered on 10/18/20at 20:51;  

Start 10/10/20 at 21:00;  Stop 10/19/20 at 07:11;  Status DC


Ceftriaxone Sodium (Rocephin) 1 gm Q24H IVP  Last administered on 10/16/20at 

10:19;  Start 10/11/20 at 10:00;  Stop 10/16/20 at 14:05;  Status DC


Methylprednisolone Sodium Succinate (SOLU-Medrol 40MG VIAL) 40 mg Q8HRS IV  Last

administered on 10/18/20at 20:50;  Start 10/10/20 at 22:00;  Stop 10/19/20 at 

06:39;  Status DC


Dextrose (Dextrose 50%-Water Syringe) 12.5 gm PRN Q15MIN  PRN IV HYPOGLYCEMIA;  

Start 10/10/20 at 16:30


Non-Formulary Medication 1 ea/ Sodium Chloride 210 ml @  210 mls/hr 1X  ONCE IV 

Last administered on 10/11/20at 11:14;  Start 10/11/20 at 12:00;  Stop 10/11/20 

at 12:59;  Status DC


Non-Formulary Medication 1 ea/ Sodium Chloride 230 ml @  460 mls/hr Q24H IV  

Last administered on 10/15/20at 12:42;  Start 10/12/20 at 12:00;  Stop 10/15/20 

at 12:29;  Status DC


Insulin Human Lispro (HumaLOG) 0-9 UNITS TIDWMEALS SQ  Last administered on 

10/13/20at 08:32;  Start 10/11/20 at 12:00;  Stop 10/13/20 at 10:54;  Status DC


Dextrose (Dextrose 50%-Water Syringe) 12.5 gm PRN Q15MIN  PRN IV SEE COMMENTS;  

Start 10/11/20 at 10:15;  Status UNV


Enoxaparin Sodium (Lovenox 100mg Syringe) 100 mg Q12HR SQ  Last administered on 

10/11/20at 10:57;  Start 10/11/20 at 11:00;  Stop 10/11/20 at 12:53;  Status DC


Enoxaparin Sodium (Lovenox 40mg Syringe) 40 mg Q12HR SQ  Last administered on 

10/22/20at 08:21;  Start 10/11/20 at 21:00


Insulin Human Lispro (HumaLOG) 10 units TIDAC SQ  Last administered on 

10/12/20at 11:47;  Start 10/11/20 at 16:30;  Stop 10/13/20 at 10:54;  Status DC


Insulin Human Lispro (HumaLOG) 10 units 1X  ONCE SQ  Last administered on 

10/11/20at 14:07;  Start 10/11/20 at 13:00;  Stop 10/11/20 at 13:39;  Status DC


Calcium Carbonate/ Glycine (Tums) 1,000 mg PRN Q4HRS  PRN PO INDIGESTION Last 

administered on 10/12/20at 10:46;  Start 10/12/20 at 10:45


Fentanyl Citrate 30 ml @  2.5 mls/hr CONT  PRN IV SEE PROTOCOL Last administered

on 10/19/20at 09:40;  Start 10/12/20 at 15:00;  Stop 10/19/20 at 10:12;  Status 

DC


Propofol 100 ml @ 0 mls/hr CONT  PRN IV SEE PROTOCOL Last administered on 

10/22/20at 14:46;  Start 10/12/20 at 15:00


Midazolam HCl 100 ml @ 0 mls/hr CONT  PRN IV SEE PROTOCOL Last administered on 

10/22/20at 07:14;  Start 10/12/20 at 15:00


Succinylcholine Chloride (Anectine) 200 mg 1X  ONCE IV  Last administered on 

10/12/20at 15:34;  Start 10/12/20 at 15:00;  Stop 10/12/20 at 15:12;  Status DC


Vecuronium Bromide (Norcuron Bolus) 6 mg 1X  ONCE IV  Last administered on 

10/12/20at 15:51;  Start 10/12/20 at 15:45;  Stop 10/12/20 at 15:46;  Status DC


Propofol (Diprivan) 100 mg 1X  ONCE IV  Last administered on 10/12/20at 17:00;  

Start 10/12/20 at 17:00;  Stop 10/12/20 at 17:01;  Status DC


Succinylcholine Chloride (Anectine) 100 mg 1X  ONCE IV ;  Start 10/12/20 at 

17:00;  Stop 10/12/20 at 17:01;  Status DC


Vecuronium Bromide (Norcuron Bolus) 6 mg PRN Q4HRS  PRN IV SEE COMMENTS Last 

administered on 10/16/20at 07:10;  Start 10/12/20 at 17:45


Famotidine (Pepcid Vial) 20 mg BID IVP ;  Start 10/13/20 at 09:00;  Stop 

10/13/20 at 07:56;  Status DC


Pantoprazole Sodium (PROTONIX VIAL for IV PUSH) 40 mg DAILY IVP  Last 

administered on 10/22/20at 08:21;  Start 10/14/20 at 09:00


Insulin Human Lispro (HumaLOG) 0-9 UNITS Q6HRS SQ  Last administered on 

10/22/20at 17:31;  Start 10/13/20 at 12:00


Insulin Human Lispro (HumaLOG) 10 units Q6HRS SQ  Last administered on 

10/14/20at 05:09;  Start 10/13/20 at 12:00;  Stop 10/14/20 at 08:41;  Status DC


Insulin Glargine (Lantus Syringe) 25 unit BID SQ  Last administered on 10/15/20

at 21:24;  Start 10/14/20 at 09:00;  Stop 10/16/20 at 08:48;  Status DC


Insulin Human Lispro (HumaLOG) 12 units Q6HRS SQ  Last administered on 

10/16/20at 06:25;  Start 10/14/20 at 08:45;  Stop 10/16/20 at 08:48;  Status DC


Atropine Sulfate (ATROPINE 0.5mg SYRINGE) 0.5 mg STK-MED ONCE .ROUTE ;  Start 

10/14/20 at 11:55;  Stop 10/14/20 at 11:56;  Status DC


Atropine Sulfate (ATROPINE 0.5mg SYRINGE) 0.5 mg STK-MED ONCE .ROUTE ;  Start 

10/14/20 at 12:00;  Stop 10/15/20 at 08:42;  Status DC


Acetaminophen (Tylenol) 650 mg PRN Q6HRS  PRN PEG MILD PAIN / TEMP > 100.3'F 

Last administered on 10/17/20at 05:07;  Start 10/15/20 at 20:30


Vancomycin HCl (Vanco Per Pharmacy) 1 each PRN DAILY  PRN MC SEE COMMENTS Last 

administered on 10/18/20at 23:33;  Start 10/16/20 at 06:30;  Stop 10/19/20 at 

12:17;  Status DC


Piperacillin Sod/ Tazobactam Sod 3.375 gm/Sodium Chloride 50 ml @  100 mls/hr 

Q6HRS IV  Last administered on 10/22/20at 17:30;  Start 10/16/20 at 12:00


Vancomycin HCl 2 gm/Sodium Chloride 500 ml @  250 mls/hr 1X  ONCE IV  Last 

administered on 10/16/20at 06:55;  Start 10/16/20 at 07:00;  Stop 10/16/20 at 

08:59;  Status DC


Insulin Glargine (Lantus Syringe) 30 unit BID SQ  Last administered on 

10/16/20at 20:45;  Start 10/16/20 at 09:00;  Stop 10/17/20 at 07:58;  Status DC


Insulin Human Lispro (HumaLOG) 15 units Q6HRS SQ  Last administered on 

10/17/20at 06:01;  Start 10/16/20 at 09:00;  Stop 10/17/20 at 07:58;  Status DC


Vancomycin HCl 1.5 gm/Sodium Chloride 500 ml @  250 mls/hr Q12H IV  Last 

administered on 10/17/20at 08:35;  Start 10/16/20 at 19:00;  Stop 10/17/20 at 

20:41;  Status DC


Vancomycin HCl (Vancomycin Trough Level) 1 each 1X  ONCE MC  Last administered 

on 10/17/20at 19:45;  Start 10/17/20 at 18:30;  Stop 10/17/20 at 18:31;  Status 

DC


Insulin Glargine (Lantus Syringe) 40 unit BID SQ  Last administered on 

10/17/20at 21:21;  Start 10/17/20 at 09:00;  Stop 10/18/20 at 07:54;  Status DC


Insulin Human Lispro (HumaLOG) 18 units Q6HRS SQ  Last administered on 

10/18/20at 05:38;  Start 10/17/20 at 08:00;  Stop 10/18/20 at 07:54;  Status DC


Vancomycin HCl 1.5 gm/Sodium Chloride 500 ml @  250 mls/hr Q8H IV  Last 

administered on 10/19/20at 06:29;  Start 10/17/20 at 21:30;  Stop 10/19/20 at 

06:45;  Status DC


Vancomycin HCl (Vancomycin Trough Level) 1 each 1X  ONCE MC  Last administered 

on 10/18/20at 21:00;  Start 10/18/20 at 21:00;  Stop 10/19/20 at 09:59;  Status 

DC


Insulin Glargine (Lantus Syringe) 45 unit BID SQ  Last administered on 

10/19/20at 09:40;  Start 10/18/20 at 08:00;  Stop 10/19/20 at 10:58;  Status DC


Insulin Human Lispro (HumaLOG) 20 units Q6HRS SQ  Last administered on 

10/22/20at 17:31;  Start 10/18/20 at 08:00


Vancomycin HCl (Vancomycin Random Level) 1 each 1X  ONCE MC ;  Start 10/20/20 at

13:00;  Stop 10/19/20 at 09:59;  Status DC


Methylprednisolone Sodium Succinate (SOLU-Medrol 40MG VIAL) 40 mg Q8HRS IV  Last

administered on 10/22/20at 14:46;  Start 10/19/20 at 06:00


Fentanyl Citrate 55 ml @ 0 mls/hr CONT  PRN IV PER PROTOCOL Last administered on

10/22/20at 13:14;  Start 10/19/20 at 10:15


Insulin Glargine (Lantus Syringe) 50 unit BID SQ  Last administered on 

10/22/20at 09:15;  Start 10/19/20 at 21:00


Haloperidol Lactate (Haldol Inj) 5 mg Q8HRS IVP  Last administered on 10/22/20at

14:46;  Start 10/21/20 at 09:45





Active Scripts


Active


Reported


Cymbalta (Duloxetine Hcl) 20 Mg Capsule. 20 Mg PO DAILY


Lisinopril 5 Mg Tablet 5 Mg PO DAILY


Amitriptyline Hcl 10 Mg Tablet 10 Mg PO DAILY


Trulicity (Dulaglutide) 1.5 Mg/0.5 Ml Pen.injctr 1.5 Mg SQ 


Nesina (Alogliptin Benzoate) 12.5 Mg Tablet 12.5 Mg PO 


Propranolol Hcl 80 Mg Tablet 80 Mg PO DAILY


Cymbalta (Duloxetine Hcl) 60 Mg Capsule.dr 60 Mg PO DAILY





Allergies


Allergies:  


Coded Allergies:  


     adhesive (Verified  Allergy, Severe, SKIN PEELS OFF, 5/22/14)


     meperidine (Verified  Allergy, Intermediate, Rash, 5/22/14)


     morphine (Verified  Allergy, Intermediate, Rash, 5/22/14)





ROS


Review of System


unobtainable





Physical Exam


General:  No acute distress


HEENT:  Other (orally intubated)





Vitals


VITALS





Vital Signs








  Date Time  Temp Pulse Resp B/P (MAP) Pulse Ox O2 Delivery O2 Flow Rate FiO2


 


10/22/20 18:00  90 20 121/67 (85) 95 Ventilator  


 


10/22/20 16:00 98.7       





 98.7       











Labs


Labs





Laboratory Tests








Test


 10/21/20


00:05 10/21/20


05:52 10/21/20


07:00 10/21/20


08:05


 


Glucose (Fingerstick)


 182 mg/dL


(70-99) 254 mg/dL


(70-99) 


 





 


White Blood Count


 


 


 14.3 x10^3/uL


(4.0-11.0) 





 


Red Blood Count


 


 


 3.67 x10^6/uL


(3.50-5.40) 





 


Hemoglobin


 


 


 10.9 g/dL


(12.0-15.5) 





 


Hematocrit


 


 


 33.4 %


(36.0-47.0) 





 


Mean Corpuscular Volume   91 fL ()  


 


Mean Corpuscular Hemoglobin   30 pg (25-35)  


 


Mean Corpuscular Hemoglobin


Concent 


 


 33 g/dL


(31-37) 





 


Red Cell Distribution Width


 


 


 14.5 %


(11.5-14.5) 





 


Platelet Count


 


 


 232 x10^3/uL


(140-400) 





 


Sodium Level


 


 


 136 mmol/L


(136-145) 





 


Potassium Level


 


 


 4.5 mmol/L


(3.5-5.1) 





 


Chloride Level


 


 


 101 mmol/L


() 





 


Carbon Dioxide Level


 


 


 31 mmol/L


(21-32) 





 


Anion Gap   4 (6-14)  


 


Blood Urea Nitrogen


 


 


 17 mg/dL


(7-20) 





 


Creatinine


 


 


 0.6 mg/dL


(0.6-1.0) 





 


Estimated GFR


(Cockcroft-Gault) 


 


 108.6 


 





 


BUN/Creatinine Ratio   28 (6-20)  


 


Glucose Level


 


 


 273 mg/dL


(70-99) 





 


Calcium Level


 


 


 8.0 mg/dL


(8.5-10.1) 





 


Total Bilirubin


 


 


 0.4 mg/dL


(0.2-1.0) 





 


Aspartate Amino Transf


(AST/SGOT) 


 


 99 U/L (15-37) 


 





 


Alanine Aminotransferase


(ALT/SGPT) 


 


 316 U/L


(14-59) 





 


Alkaline Phosphatase


 


 


 112 U/L


() 





 


Total Protein


 


 


 4.7 g/dL


(6.4-8.2) 





 


Albumin


 


 


 1.7 g/dL


(3.4-5.0) 





 


Albumin/Globulin Ratio   0.6 (1.0-1.7)  


 


O2 Saturation    94 % (92-99) 


 


Arterial Blood pH


 


 


 


 7.41


(7.35-7.45)


 


Arterial Blood pCO2 at


Patient Temp 


 


 


 44 mmHg


(35-46)


 


Arterial Blood pO2 at Patient


Temp 


 


 


 74 mmHg


()


 


Arterial Blood HCO3


 


 


 


 27 mmol/L


(21-28)


 


Arterial Blood Base Excess


 


 


 


 2 mmol/L


(-3-3)


 


FiO2    50 


 


Test


 10/21/20


11:51 10/22/20


00:05 10/22/20


06:00 10/22/20


07:30


 


Glucose (Fingerstick)


 210 mg/dL


(70-99) 212 mg/dL


(70-99) 183 mg/dL


(70-99) 





 


White Blood Count


 


 


 16.9 x10^3/uL


(4.0-11.0) 





 


Red Blood Count


 


 


 3.72 x10^6/uL


(3.50-5.40) 





 


Hemoglobin


 


 


 10.8 g/dL


(12.0-15.5) 





 


Hematocrit


 


 


 33.6 %


(36.0-47.0) 





 


Mean Corpuscular Volume   90 fL ()  


 


Mean Corpuscular Hemoglobin   29 pg (25-35)  


 


Mean Corpuscular Hemoglobin


Concent 


 


 32 g/dL


(31-37) 





 


Red Cell Distribution Width


 


 


 14.5 %


(11.5-14.5) 





 


Platelet Count


 


 


 265 x10^3/uL


(140-400) 





 


Sodium Level


 


 


 136 mmol/L


(136-145) 





 


Potassium Level


 


 


 4.6 mmol/L


(3.5-5.1) 





 


Chloride Level


 


 


 100 mmol/L


() 





 


Carbon Dioxide Level


 


 


 31 mmol/L


(21-32) 





 


Anion Gap   5 (6-14)  


 


Blood Urea Nitrogen


 


 


 17 mg/dL


(7-20) 





 


Creatinine


 


 


 0.6 mg/dL


(0.6-1.0) 





 


Estimated GFR


(Cockcroft-Gault) 


 


 108.6 


 





 


BUN/Creatinine Ratio   28 (6-20)  


 


Glucose Level


 


 


 185 mg/dL


(70-99) 





 


Calcium Level


 


 


 8.3 mg/dL


(8.5-10.1) 





 


Total Bilirubin


 


 


 0.3 mg/dL


(0.2-1.0) 





 


Aspartate Amino Transf


(AST/SGOT) 


 


 104 U/L


(15-37) 





 


Alanine Aminotransferase


(ALT/SGPT) 


 


 330 U/L


(14-59) 





 


Alkaline Phosphatase


 


 


 118 U/L


() 





 


Total Protein


 


 


 4.9 g/dL


(6.4-8.2) 





 


Albumin


 


 


 1.8 g/dL


(3.4-5.0) 





 


Albumin/Globulin Ratio   0.6 (1.0-1.7)  


 


O2 Saturation    93 % (92-99) 


 


Arterial Blood pH


 


 


 


 7.49


(7.35-7.45)


 


Arterial Blood pCO2 at


Patient Temp 


 


 


 38 mmHg


(35-46)


 


Arterial Blood pO2 at Patient


Temp 


 


 


 69 mmHg


()


 


Arterial Blood HCO3


 


 


 


 29 mmol/L


(21-28)


 


Arterial Blood Base Excess


 


 


 


 5 mmol/L


(-3-3)


 


FiO2    50/vent 


 


Test


 10/22/20


11:17 10/22/20


17:28 


 





 


Glucose (Fingerstick)


 162 mg/dL


(70-99) 195 mg/dL


(70-99) 


 











Laboratory Tests








Test


 10/22/20


00:05 10/22/20


06:00 10/22/20


07:30 10/22/20


11:17


 


Glucose (Fingerstick)


 212 mg/dL


(70-99) 183 mg/dL


(70-99) 


 162 mg/dL


(70-99)


 


White Blood Count


 


 16.9 x10^3/uL


(4.0-11.0) 


 





 


Red Blood Count


 


 3.72 x10^6/uL


(3.50-5.40) 


 





 


Hemoglobin


 


 10.8 g/dL


(12.0-15.5) 


 





 


Hematocrit


 


 33.6 %


(36.0-47.0) 


 





 


Mean Corpuscular Volume  90 fL ()   


 


Mean Corpuscular Hemoglobin  29 pg (25-35)   


 


Mean Corpuscular Hemoglobin


Concent 


 32 g/dL


(31-37) 


 





 


Red Cell Distribution Width


 


 14.5 %


(11.5-14.5) 


 





 


Platelet Count


 


 265 x10^3/uL


(140-400) 


 





 


Sodium Level


 


 136 mmol/L


(136-145) 


 





 


Potassium Level


 


 4.6 mmol/L


(3.5-5.1) 


 





 


Chloride Level


 


 100 mmol/L


() 


 





 


Carbon Dioxide Level


 


 31 mmol/L


(21-32) 


 





 


Anion Gap  5 (6-14)   


 


Blood Urea Nitrogen


 


 17 mg/dL


(7-20) 


 





 


Creatinine


 


 0.6 mg/dL


(0.6-1.0) 


 





 


Estimated GFR


(Cockcroft-Gault) 


 108.6 


 


 





 


BUN/Creatinine Ratio  28 (6-20)   


 


Glucose Level


 


 185 mg/dL


(70-99) 


 





 


Calcium Level


 


 8.3 mg/dL


(8.5-10.1) 


 





 


Total Bilirubin


 


 0.3 mg/dL


(0.2-1.0) 


 





 


Aspartate Amino Transf


(AST/SGOT) 


 104 U/L


(15-37) 


 





 


Alanine Aminotransferase


(ALT/SGPT) 


 330 U/L


(14-59) 


 





 


Alkaline Phosphatase


 


 118 U/L


() 


 





 


Total Protein


 


 4.9 g/dL


(6.4-8.2) 


 





 


Albumin


 


 1.8 g/dL


(3.4-5.0) 


 





 


Albumin/Globulin Ratio  0.6 (1.0-1.7)   


 


O2 Saturation   93 % (92-99)  


 


Arterial Blood pH


 


 


 7.49


(7.35-7.45) 





 


Arterial Blood pCO2 at


Patient Temp 


 


 38 mmHg


(35-46) 





 


Arterial Blood pO2 at Patient


Temp 


 


 69 mmHg


() 





 


Arterial Blood HCO3


 


 


 29 mmol/L


(21-28) 





 


Arterial Blood Base Excess


 


 


 5 mmol/L


(-3-3) 





 


FiO2   50/vent  


 


Test


 10/22/20


17:28 


 


 





 


Glucose (Fingerstick)


 195 mg/dL


(70-99) 


 


 














Assessment/Plan


Assessment/Plan


Respiratory failure


will follow for possible tracheostomy.





Thanks for consult!











BLACK MARIN MD             Oct 22, 2020 20:07

## 2020-10-23 VITALS — DIASTOLIC BLOOD PRESSURE: 62 MMHG | SYSTOLIC BLOOD PRESSURE: 105 MMHG

## 2020-10-23 VITALS — SYSTOLIC BLOOD PRESSURE: 131 MMHG | DIASTOLIC BLOOD PRESSURE: 76 MMHG

## 2020-10-23 VITALS — DIASTOLIC BLOOD PRESSURE: 50 MMHG | SYSTOLIC BLOOD PRESSURE: 94 MMHG

## 2020-10-23 VITALS — DIASTOLIC BLOOD PRESSURE: 74 MMHG | SYSTOLIC BLOOD PRESSURE: 122 MMHG

## 2020-10-23 VITALS — DIASTOLIC BLOOD PRESSURE: 56 MMHG | SYSTOLIC BLOOD PRESSURE: 97 MMHG

## 2020-10-23 VITALS — SYSTOLIC BLOOD PRESSURE: 111 MMHG | DIASTOLIC BLOOD PRESSURE: 60 MMHG

## 2020-10-23 VITALS — DIASTOLIC BLOOD PRESSURE: 75 MMHG | SYSTOLIC BLOOD PRESSURE: 113 MMHG

## 2020-10-23 VITALS — SYSTOLIC BLOOD PRESSURE: 124 MMHG | DIASTOLIC BLOOD PRESSURE: 85 MMHG

## 2020-10-23 VITALS — DIASTOLIC BLOOD PRESSURE: 51 MMHG | SYSTOLIC BLOOD PRESSURE: 99 MMHG

## 2020-10-23 VITALS — DIASTOLIC BLOOD PRESSURE: 96 MMHG | SYSTOLIC BLOOD PRESSURE: 140 MMHG

## 2020-10-23 VITALS — DIASTOLIC BLOOD PRESSURE: 63 MMHG | SYSTOLIC BLOOD PRESSURE: 113 MMHG

## 2020-10-23 VITALS — DIASTOLIC BLOOD PRESSURE: 54 MMHG | SYSTOLIC BLOOD PRESSURE: 98 MMHG

## 2020-10-23 VITALS — SYSTOLIC BLOOD PRESSURE: 118 MMHG | DIASTOLIC BLOOD PRESSURE: 76 MMHG

## 2020-10-23 VITALS — DIASTOLIC BLOOD PRESSURE: 76 MMHG | SYSTOLIC BLOOD PRESSURE: 131 MMHG

## 2020-10-23 VITALS — SYSTOLIC BLOOD PRESSURE: 132 MMHG | DIASTOLIC BLOOD PRESSURE: 80 MMHG

## 2020-10-23 VITALS — DIASTOLIC BLOOD PRESSURE: 83 MMHG | SYSTOLIC BLOOD PRESSURE: 131 MMHG

## 2020-10-23 VITALS — DIASTOLIC BLOOD PRESSURE: 69 MMHG | SYSTOLIC BLOOD PRESSURE: 107 MMHG

## 2020-10-23 VITALS — SYSTOLIC BLOOD PRESSURE: 110 MMHG | DIASTOLIC BLOOD PRESSURE: 65 MMHG

## 2020-10-23 VITALS — SYSTOLIC BLOOD PRESSURE: 135 MMHG | DIASTOLIC BLOOD PRESSURE: 82 MMHG

## 2020-10-23 VITALS — DIASTOLIC BLOOD PRESSURE: 88 MMHG | SYSTOLIC BLOOD PRESSURE: 113 MMHG

## 2020-10-23 VITALS — SYSTOLIC BLOOD PRESSURE: 113 MMHG | DIASTOLIC BLOOD PRESSURE: 75 MMHG

## 2020-10-23 VITALS — DIASTOLIC BLOOD PRESSURE: 80 MMHG | SYSTOLIC BLOOD PRESSURE: 132 MMHG

## 2020-10-23 VITALS — DIASTOLIC BLOOD PRESSURE: 62 MMHG | SYSTOLIC BLOOD PRESSURE: 145 MMHG

## 2020-10-23 LAB
ALBUMIN SERPL-MCNC: 1.9 G/DL (ref 3.4–5)
ALBUMIN/GLOB SERPL: 0.6 {RATIO} (ref 1–1.7)
ALP SERPL-CCNC: 142 U/L (ref 46–116)
ALT SERPL-CCNC: 463 U/L (ref 14–59)
ANION GAP SERPL CALC-SCNC: 6 MMOL/L (ref 6–14)
AST SERPL-CCNC: 206 U/L (ref 15–37)
BASE EXCESS ABG: 3 MMOL/L (ref -3–3)
BASE EXCESS ABG: 4 MMOL/L (ref -3–3)
BILIRUB SERPL-MCNC: 0.5 MG/DL (ref 0.2–1)
BUN SERPL-MCNC: 17 MG/DL (ref 7–20)
BUN/CREAT SERPL: 28 (ref 6–20)
CALCIUM SERPL-MCNC: 8.6 MG/DL (ref 8.5–10.1)
CHLORIDE SERPL-SCNC: 101 MMOL/L (ref 98–107)
CO2 SERPL-SCNC: 31 MMOL/L (ref 21–32)
CREAT SERPL-MCNC: 0.6 MG/DL (ref 0.6–1)
GFR SERPLBLD BASED ON 1.73 SQ M-ARVRAT: 108.6 ML/MIN
GLUCOSE SERPL-MCNC: 158 MG/DL (ref 70–99)
HCO3 BLDA-SCNC: 27 MMOL/L (ref 21–28)
HCO3 BLDA-SCNC: 29 MMOL/L (ref 21–28)
INSPIRATION SETTING TIME VENT: (no result)
INSPIRATION SETTING TIME VENT: (no result)
PCO2 BLDA: 37 MMHG (ref 35–46)
PCO2 BLDA: 41 MMHG (ref 35–46)
PO2 BLDA: 73 MMHG (ref 75–108)
PO2 BLDA: 75 MMHG (ref 75–108)
POTASSIUM SERPL-SCNC: 4.3 MMOL/L (ref 3.5–5.1)
PROT SERPL-MCNC: 5.1 G/DL (ref 6.4–8.2)
SAO2 % BLDA: 94 % (ref 92–99)
SAO2 % BLDA: 95 % (ref 92–99)
SODIUM SERPL-SCNC: 138 MMOL/L (ref 136–145)

## 2020-10-23 RX ADMIN — LINAGLIPTIN SCH MG: 5 TABLET, FILM COATED ORAL at 08:42

## 2020-10-23 RX ADMIN — PIPERACILLIN SODIUM AND TAZOBACTAM SODIUM SCH MLS/HR: 3; .375 INJECTION, POWDER, LYOPHILIZED, FOR SOLUTION INTRAVENOUS at 18:07

## 2020-10-23 RX ADMIN — INSULIN GLARGINE SCH UNIT: 100 INJECTION, SOLUTION SUBCUTANEOUS at 20:43

## 2020-10-23 RX ADMIN — LISINOPRIL SCH MG: 5 TABLET ORAL at 08:41

## 2020-10-23 RX ADMIN — PANTOPRAZOLE SODIUM SCH MG: 40 INJECTION, POWDER, FOR SOLUTION INTRAVENOUS at 08:41

## 2020-10-23 RX ADMIN — HALOPERIDOL LACTATE SCH MG: 5 INJECTION, SOLUTION INTRAMUSCULAR at 14:00

## 2020-10-23 RX ADMIN — ENOXAPARIN SODIUM SCH MG: 40 INJECTION SUBCUTANEOUS at 08:42

## 2020-10-23 RX ADMIN — INSULIN LISPRO SCH UNITS: 100 INJECTION, SOLUTION INTRAVENOUS; SUBCUTANEOUS at 06:09

## 2020-10-23 RX ADMIN — INSULIN GLARGINE SCH UNIT: 100 INJECTION, SOLUTION SUBCUTANEOUS at 10:17

## 2020-10-23 RX ADMIN — PIPERACILLIN SODIUM AND TAZOBACTAM SODIUM SCH MLS/HR: 3; .375 INJECTION, POWDER, LYOPHILIZED, FOR SOLUTION INTRAVENOUS at 06:08

## 2020-10-23 RX ADMIN — INSULIN LISPRO SCH UNITS: 100 INJECTION, SOLUTION INTRAVENOUS; SUBCUTANEOUS at 18:07

## 2020-10-23 RX ADMIN — INSULIN LISPRO SCH UNITS: 100 INJECTION, SOLUTION INTRAVENOUS; SUBCUTANEOUS at 12:00

## 2020-10-23 RX ADMIN — ENOXAPARIN SODIUM SCH MG: 40 INJECTION SUBCUTANEOUS at 20:41

## 2020-10-23 RX ADMIN — METHYLPREDNISOLONE SODIUM SUCCINATE SCH MG: 40 INJECTION, POWDER, FOR SOLUTION INTRAMUSCULAR; INTRAVENOUS at 20:42

## 2020-10-23 RX ADMIN — HALOPERIDOL LACTATE SCH MG: 5 INJECTION, SOLUTION INTRAMUSCULAR at 06:08

## 2020-10-23 RX ADMIN — PROPOFOL PRN MLS/HR: 10 INJECTION, EMULSION INTRAVENOUS at 06:10

## 2020-10-23 RX ADMIN — METHYLPREDNISOLONE SODIUM SUCCINATE SCH MG: 40 INJECTION, POWDER, FOR SOLUTION INTRAMUSCULAR; INTRAVENOUS at 13:12

## 2020-10-23 RX ADMIN — HALOPERIDOL LACTATE SCH MG: 5 INJECTION, SOLUTION INTRAMUSCULAR at 20:42

## 2020-10-23 RX ADMIN — PIPERACILLIN SODIUM AND TAZOBACTAM SODIUM SCH MLS/HR: 3; .375 INJECTION, POWDER, LYOPHILIZED, FOR SOLUTION INTRAVENOUS at 13:12

## 2020-10-23 RX ADMIN — METHYLPREDNISOLONE SODIUM SUCCINATE SCH MG: 40 INJECTION, POWDER, FOR SOLUTION INTRAMUSCULAR; INTRAVENOUS at 06:07

## 2020-10-23 NOTE — PDOC
Infectious Disease Note


Subjective


Subjective


intubated on vent





ROS


ROS


no n/v/d/





Vital Sign


Vital Signs





Vital Signs








  Date Time  Temp Pulse Resp B/P (MAP) Pulse Ox O2 Delivery O2 Flow Rate FiO2


 


10/23/20 08:41  65  110/65    


 


10/23/20 08:12     95 Ventilator  


 


10/23/20 07:00   20     


 


10/23/20 04:00 97.9       





 97.9       











Physical Exam


PHYSICAL EXAM


GENERAL:  intubated on vent


HEENT:  Normocephalic, atraumatic.  Anicteric.


NECK:  Supple.  No thyromegaly.


LUNGS:  Coarse breath sounds.  No wheezing.


HEART:  S1, S2, no murmurs.


ABDOMEN:  Obese, soft, nontender, bowel sounds present.


EXTREMITIES:  No edema, no cyanosis.


DERMATOLOGIC:  Warm, dry.  No generalized rash.  Multiple tattoos.


CENTRAL NERVOUS SYSTEM:  sedated intubated





Labs


Lab





Laboratory Tests








Test


 10/22/20


11:17 10/22/20


17:28 10/22/20


23:33 10/23/20


06:00


 


Glucose (Fingerstick)


 162 mg/dL


(70-99) 195 mg/dL


(70-99) 178 mg/dL


(70-99) 160 mg/dL


(70-99)


 


Sodium Level


 


 


 


 138 mmol/L


(136-145)


 


Potassium Level


 


 


 


 4.3 mmol/L


(3.5-5.1)


 


Chloride Level


 


 


 


 101 mmol/L


()


 


Carbon Dioxide Level


 


 


 


 31 mmol/L


(21-32)


 


Anion Gap    6 (6-14) 


 


Blood Urea Nitrogen


 


 


 


 17 mg/dL


(7-20)


 


Creatinine


 


 


 


 0.6 mg/dL


(0.6-1.0)


 


Estimated GFR


(Cockcroft-Gault) 


 


 


 108.6 





 


BUN/Creatinine Ratio    28 (6-20) 


 


Glucose Level


 


 


 


 158 mg/dL


(70-99)


 


Calcium Level


 


 


 


 8.6 mg/dL


(8.5-10.1)


 


Total Bilirubin


 


 


 


 0.5 mg/dL


(0.2-1.0)


 


Aspartate Amino Transf


(AST/SGOT) 


 


 


 206 U/L


(15-37)


 


Alanine Aminotransferase


(ALT/SGPT) 


 


 


 463 U/L


(14-59)


 


Alkaline Phosphatase


 


 


 


 142 U/L


()


 


Total Protein


 


 


 


 5.1 g/dL


(6.4-8.2)


 


Albumin


 


 


 


 1.9 g/dL


(3.4-5.0)


 


Albumin/Globulin Ratio    0.6 (1.0-1.7) 


 


Test


 10/23/20


08:25 


 


 





 


O2 Saturation 94 % (92-99)    


 


Arterial Blood pH


 7.47


(7.35-7.45) 


 


 





 


Arterial Blood pCO2 at


Patient Temp 37 mmHg


(35-46) 


 


 





 


Arterial Blood pO2 at Patient


Temp 73 mmHg


() 


 


 





 


Arterial Blood HCO3


 27 mmol/L


(21-28) 


 


 





 


Arterial Blood Base Excess


 3 mmol/L


(-3-3) 


 


 





 


FiO2 50%    








Micro





Microbiology


10/15/20 Blood Culture - Preliminary, Resulted


           NO GROWTH AFTER 1 DAY





Objective


Assessment


1.  Acute hypoxic respiratory failure.


2.  COVID-19 Pneumonia


3.  Migraine headaches.


4.  Diabetes.


5.  Obesity.


6.  Fibromyalgia.


7.  Abnormal LFTs.





Plan


Plan of Care


cont steroids


cont Remdesivir


supportive care


 zosyn


slowly improving











HEDY BRAND MD               Oct 23, 2020 09:56

## 2020-10-23 NOTE — PN
DATE:  10/23/2020



SUBJECTIVE:  The patient is resting, slightly propped up in bed, in no apparent

distress.  She continued to be sedated on propofol, Versed and fentanyl,

although she does open her eyes and follows commands according to the nursing

staff.  She is now maintaining her oxygen saturation at 95% on FiO2 of 50%.



OBJECTIVE:

GENERAL:  On examining her, she looked pale, but no jaundice, cyanosis or

thyromegaly.  No jugular venous distention.  No lower limb edema.

VITAL SIGNS:  Her heart rate was 65, blood pressure was 110/65, her temperature

was 97.9, respiratory rate 20, and oxygen saturation was 95%.

HEAD, EYES, EARS, NOSE AND THROAT:  Normocephalic, atraumatic.  She has

orotracheal and orogastric tube in place.

NECK:  Supple.

CARDIAC:  Normal first and second heart sounds.  No gallop or murmur.

CHEST:  Showed central trachea, equal bilateral expansion, air entry, vesicular

breath sounds.  I could not appreciate any crepitation or rhonchi anteriorly.

ABDOMEN:  Distended, soft, nontender.

NEUROLOGIC:  She is sedated, but does open her eyes and follows command.



DIAGNOSTIC DATA:  Chest x-ray showed the endotracheal tube is unchanged in

position above the level of the clavicles.  NG tube extends into the stomach. 

Central line extends into the left brachiocephalic vein.  Infiltrates persist

without significant change.



LABORATORY DATA:  Her lab work this morning showed a white cell count 16,900,

hemoglobin 11, hematocrit 33, MCV 90, and platelet count 265,000.  Her chemistry

showed a serum sodium of 138, potassium 4.3, chloride 101, bicarbonate 31, anion

gap of 6, BUN 17, creatinine 0.6, estimated GFR was 180 mL per minute. Her

glucose is much better controlled now at 158, calcium was 8.6.  Total bilirubin

is normal.  AST, ALT, and alkaline phosphatase are elevated.  Her total protein

5.1 and albumin was 1.9.  Her C-reactive protein came down from 161 to 19 and

her D-dimer came down from 1.96 to 1.43.



ASSESSMENT:

1.  COVID-19 pneumonia.

2.  Acute hypoxic respiratory failure requiring intubation and mechanical

ventilation.  She is now maintaining her oxygen saturations 96% on FiO2 of 50%.

3.  She did spike her temperature up to 101.7.  We did send blood for culture

and sensitivity.  She is currently on Zosyn; however, her blood cultures are

negative and her vancomycin was discontinued.

4.  The patient has multiple other medical problems include:

A. Migraine headache.

B.  Type 2 diabetes mellitus.

C.  Fibromyalgia.

D.  Hypertension.

E.  Nephrolithiasis.

F.  Nonalcoholic steatohepatitis.

G.  Obstructive sleep apnea.



PLAN:  To obviously continue with mechanical ventilation, wean as tolerated. 

Continue IV antibiotic.  Continue with steroids.  Continue with DVT prophylaxis.

 



______________________________

JULIO CISSE MD



DR:  GOKUL/glo  JOB#:  [id number] / [id number]

DD:  10/23/2020 09:05  DT:  10/23/2020 09:16

## 2020-10-23 NOTE — NUR
Sedation off at 0930. Patient alert and following commands at 1055. Placed on CPap trial. 
ABG drawn at 1130. Sisillo on unit with orders to extubate. Patient was extubated at 1142 
and placed on 4LNC, O2 sats 92%, in no apparent distress. Will monitor.

## 2020-10-23 NOTE — NUR
SS following up with discharge planning. SS reviewed pt chart and discussed with pt RN. Pt 
extubated and now on nasal canula at four liters. COVID19 positive. Pt on IV Zosyn. Pt has 
been accepted at [facility], [phone]; fax [phone]. Pt will need 
PT/OT/ST orders to assess rehab needs. SS will continue to follow for discharge planning.

## 2020-10-23 NOTE — PDOC
PULMONARY PROGRESS NOTES


DATE: 10/23/20 


TIME: 14:22


Subjective


Patient was intubated on 10/12/20 


AC control, sedated off at this time 


afebrile 


no overnight concerns from nursing


Vitals





Vital Signs








  Date Time  Temp Pulse Resp B/P (MAP) Pulse Ox O2 Delivery O2 Flow Rate FiO2


 


10/23/20 13:04      Nasal Cannula 4.0 


 


10/23/20 13:00  86 24 131/83 (99) 92   


 


10/23/20 12:00 98.5       





 98.5       








Comments


Pt. is seen during covid 19 pandemic, visual exam preformed 


on vent sedated


no accessory muscle use


RRR


no edema or rash noted


Labs





Laboratory Tests








Test


 10/22/20


00:05 10/22/20


06:00 10/22/20


07:30 10/22/20


11:17


 


Glucose (Fingerstick)


 212 mg/dL


(70-99) 183 mg/dL


(70-99) 


 162 mg/dL


(70-99)


 


White Blood Count


 


 16.9 x10^3/uL


(4.0-11.0) 


 





 


Red Blood Count


 


 3.72 x10^6/uL


(3.50-5.40) 


 





 


Hemoglobin


 


 10.8 g/dL


(12.0-15.5) 


 





 


Hematocrit


 


 33.6 %


(36.0-47.0) 


 





 


Mean Corpuscular Volume  90 fL ()   


 


Mean Corpuscular Hemoglobin  29 pg (25-35)   


 


Mean Corpuscular Hemoglobin


Concent 


 32 g/dL


(31-37) 


 





 


Red Cell Distribution Width


 


 14.5 %


(11.5-14.5) 


 





 


Platelet Count


 


 265 x10^3/uL


(140-400) 


 





 


Sodium Level


 


 136 mmol/L


(136-145) 


 





 


Potassium Level


 


 4.6 mmol/L


(3.5-5.1) 


 





 


Chloride Level


 


 100 mmol/L


() 


 





 


Carbon Dioxide Level


 


 31 mmol/L


(21-32) 


 





 


Anion Gap  5 (6-14)   


 


Blood Urea Nitrogen


 


 17 mg/dL


(7-20) 


 





 


Creatinine


 


 0.6 mg/dL


(0.6-1.0) 


 





 


Estimated GFR


(Cockcroft-Gault) 


 108.6 


 


 





 


BUN/Creatinine Ratio  28 (6-20)   


 


Glucose Level


 


 185 mg/dL


(70-99) 


 





 


Calcium Level


 


 8.3 mg/dL


(8.5-10.1) 


 





 


Total Bilirubin


 


 0.3 mg/dL


(0.2-1.0) 


 





 


Aspartate Amino Transf


(AST/SGOT) 


 104 U/L


(15-37) 


 





 


Alanine Aminotransferase


(ALT/SGPT) 


 330 U/L


(14-59) 


 





 


Alkaline Phosphatase


 


 118 U/L


() 


 





 


Total Protein


 


 4.9 g/dL


(6.4-8.2) 


 





 


Albumin


 


 1.8 g/dL


(3.4-5.0) 


 





 


Albumin/Globulin Ratio  0.6 (1.0-1.7)   


 


O2 Saturation   93 % (92-99)  


 


Arterial Blood pH


 


 


 7.49


(7.35-7.45) 





 


Arterial Blood pCO2 at


Patient Temp 


 


 38 mmHg


(35-46) 





 


Arterial Blood pO2 at Patient


Temp 


 


 69 mmHg


() 





 


Arterial Blood HCO3


 


 


 29 mmol/L


(21-28) 





 


Arterial Blood Base Excess


 


 


 5 mmol/L


(-3-3) 





 


FiO2   50/vent  


 


Test


 10/22/20


17:28 10/22/20


23:33 10/23/20


06:00 10/23/20


08:25


 


Glucose (Fingerstick)


 195 mg/dL


(70-99) 178 mg/dL


(70-99) 160 mg/dL


(70-99) 





 


Sodium Level


 


 


 138 mmol/L


(136-145) 





 


Potassium Level


 


 


 4.3 mmol/L


(3.5-5.1) 





 


Chloride Level


 


 


 101 mmol/L


() 





 


Carbon Dioxide Level


 


 


 31 mmol/L


(21-32) 





 


Anion Gap   6 (6-14)  


 


Blood Urea Nitrogen


 


 


 17 mg/dL


(7-20) 





 


Creatinine


 


 


 0.6 mg/dL


(0.6-1.0) 





 


Estimated GFR


(Cockcroft-Gault) 


 


 108.6 


 





 


BUN/Creatinine Ratio   28 (6-20)  


 


Glucose Level


 


 


 158 mg/dL


(70-99) 





 


Calcium Level


 


 


 8.6 mg/dL


(8.5-10.1) 





 


Total Bilirubin


 


 


 0.5 mg/dL


(0.2-1.0) 





 


Aspartate Amino Transf


(AST/SGOT) 


 


 206 U/L


(15-37) 





 


Alanine Aminotransferase


(ALT/SGPT) 


 


 463 U/L


(14-59) 





 


Alkaline Phosphatase


 


 


 142 U/L


() 





 


Total Protein


 


 


 5.1 g/dL


(6.4-8.2) 





 


Albumin


 


 


 1.9 g/dL


(3.4-5.0) 





 


Albumin/Globulin Ratio   0.6 (1.0-1.7)  


 


O2 Saturation    94 % (92-99) 


 


Arterial Blood pH


 


 


 


 7.47


(7.35-7.45)


 


Arterial Blood pCO2 at


Patient Temp 


 


 


 37 mmHg


(35-46)


 


Arterial Blood pO2 at Patient


Temp 


 


 


 73 mmHg


()


 


Arterial Blood HCO3


 


 


 


 27 mmol/L


(21-28)


 


Arterial Blood Base Excess


 


 


 


 3 mmol/L


(-3-3)


 


FiO2    50% 


 


Test


 10/23/20


11:30 10/23/20


12:31 


 





 


O2 Saturation 95 % (92-99)    


 


Arterial Blood pH


 7.46


(7.35-7.45) 


 


 





 


Arterial Blood pCO2 at


Patient Temp 41 mmHg


(35-46) 


 


 





 


Arterial Blood pO2 at Patient


Temp 75 mmHg


() 


 


 





 


Arterial Blood HCO3


 29 mmol/L


(21-28) 


 


 





 


Arterial Blood Base Excess


 4 mmol/L


(-3-3) 


 


 





 


FiO2 40/cpap    


 


Glucose (Fingerstick)


 


 190 mg/dL


(70-99) 


 











Laboratory Tests








Test


 10/22/20


17:28 10/22/20


23:33 10/23/20


06:00 10/23/20


08:25


 


Glucose (Fingerstick)


 195 mg/dL


(70-99) 178 mg/dL


(70-99) 160 mg/dL


(70-99) 





 


Sodium Level


 


 


 138 mmol/L


(136-145) 





 


Potassium Level


 


 


 4.3 mmol/L


(3.5-5.1) 





 


Chloride Level


 


 


 101 mmol/L


() 





 


Carbon Dioxide Level


 


 


 31 mmol/L


(21-32) 





 


Anion Gap   6 (6-14)  


 


Blood Urea Nitrogen


 


 


 17 mg/dL


(7-20) 





 


Creatinine


 


 


 0.6 mg/dL


(0.6-1.0) 





 


Estimated GFR


(Cockcroft-Gault) 


 


 108.6 


 





 


BUN/Creatinine Ratio   28 (6-20)  


 


Glucose Level


 


 


 158 mg/dL


(70-99) 





 


Calcium Level


 


 


 8.6 mg/dL


(8.5-10.1) 





 


Total Bilirubin


 


 


 0.5 mg/dL


(0.2-1.0) 





 


Aspartate Amino Transf


(AST/SGOT) 


 


 206 U/L


(15-37) 





 


Alanine Aminotransferase


(ALT/SGPT) 


 


 463 U/L


(14-59) 





 


Alkaline Phosphatase


 


 


 142 U/L


() 





 


Total Protein


 


 


 5.1 g/dL


(6.4-8.2) 





 


Albumin


 


 


 1.9 g/dL


(3.4-5.0) 





 


Albumin/Globulin Ratio   0.6 (1.0-1.7)  


 


O2 Saturation    94 % (92-99) 


 


Arterial Blood pH


 


 


 


 7.47


(7.35-7.45)


 


Arterial Blood pCO2 at


Patient Temp 


 


 


 37 mmHg


(35-46)


 


Arterial Blood pO2 at Patient


Temp 


 


 


 73 mmHg


()


 


Arterial Blood HCO3


 


 


 


 27 mmol/L


(21-28)


 


Arterial Blood Base Excess


 


 


 


 3 mmol/L


(-3-3)


 


FiO2    50% 


 


Test


 10/23/20


11:30 10/23/20


12:31 


 





 


O2 Saturation 95 % (92-99)    


 


Arterial Blood pH


 7.46


(7.35-7.45) 


 


 





 


Arterial Blood pCO2 at


Patient Temp 41 mmHg


(35-46) 


 


 





 


Arterial Blood pO2 at Patient


Temp 75 mmHg


() 


 


 





 


Arterial Blood HCO3


 29 mmol/L


(21-28) 


 


 





 


Arterial Blood Base Excess


 4 mmol/L


(-3-3) 


 


 





 


FiO2 40/cpap    


 


Glucose (Fingerstick)


 


 190 mg/dL


(70-99) 


 











Medications





Active Scripts








 Medications  Dose


 Route/Sig


 Max Daily Dose Days Date Category


 


 Cymbalta


  (Duloxetine Hcl)


 20 Mg Capsule.dr  20 Mg


 PO DAILY


   9/22/20 Reported


 


 Lisinopril 5 Mg


 Tablet  5 Mg


 PO DAILY


   9/22/20 Reported


 


 Amitriptyline Hcl


 10 Mg Tablet  10 Mg


 PO DAILY


   9/22/20 Reported


 


 Trulicity


  (Dulaglutide) 1.5


 Mg/0.5 Ml


 Pen.injctr  1.5 Mg


 SQ


   9/22/20 Reported


 


 Nesina


  (Alogliptin


 Benzoate) 12.5 Mg


 Tablet  12.5 Mg


 PO


   9/22/20 Reported


 


 Propranolol Hcl


 80 Mg Tablet  80 Mg


 PO DAILY


   5/14/14 Reported


 


 Cymbalta


  (Duloxetine Hcl)


 60 Mg Capsule.dr  60 Mg


 PO DAILY


   5/14/14 Reported








Comments


CXR 10/17 reviewed


1. ET tube terminates 3.7 cm above the hiren.


2. Lung volumes are lower and show increasing consolidation at the left 


lung base.-


 





CXR 10/19/20


IMPRESSION:


1. Endotracheal tube appears higher or more cephalad position compared to 


the prior study although positioning is somewhat different.


2. Persistent hazy infiltrates








CXR


 10/21


IMPRESSION:


1. Endotracheal tube appears somewhat high above the clavicles.


2. Infiltrates without change.





Impression


.


IMPRESSION:


1.  Acute hypoxemic respiratory failure.-- S/P intubation on 10/12/20 --imp

roving, extubated 10/23/20


2.  COVID-19 viral pneumonia.


3.  Abnormal chest x-ray, possible gram-positive, gram-negative pneumonia.


4.  Headaches.


5.  Type 2 diabetes.


6.  Fibromyalgia.


7.  Morbid obesity.


8.  Nonalcoholic steatohepatitis.


9.  Obstructive sleep apnea.


10. Abnormal D-Dimer due to COVID, improving 10/15


11. Elevated LF likely 2/2 covid





Plan


.


Continue current support now on 5 of PEEP 50% FiO2, ABG reviewed no further 

changes


Patient placed on CPAP trial tolerated well post trail ABG reviewed and pt. 

extubated-- now on 4 liters N/C 


Follow chest x-ray 


S/P plasma, cont. full course of remdesivir 


Cont. steroids, with taper  


ABX per ID--Zosyn


PT/OT/OT


Follow clinical course 


DVT/GI PPX; protonix and BID lovenox 





D/W RN and RT 


Critical care time from 1030-1100AM, no overlap











LEWIS DICK MD              Oct 23, 2020 14:25

## 2020-10-23 NOTE — RAD
AP chest.

 

HISTORY: Covid pneumonia

 

AP view was taken of the chest. Endotracheal tube is unchanged in position

above the level the clavicles. NG tube extends into the stomach. Central 

line extends to the left brachiocephalic vein. Infiltrates persist without

significant change.

 

IMPRESSION:

1. No change from the prior study.

 

Electronically signed by: Lloyd Desai MD (10/23/2020 5:00 AM) [id number]

## 2020-10-24 VITALS — SYSTOLIC BLOOD PRESSURE: 130 MMHG | DIASTOLIC BLOOD PRESSURE: 75 MMHG

## 2020-10-24 VITALS — SYSTOLIC BLOOD PRESSURE: 127 MMHG | DIASTOLIC BLOOD PRESSURE: 88 MMHG

## 2020-10-24 VITALS — SYSTOLIC BLOOD PRESSURE: 125 MMHG | DIASTOLIC BLOOD PRESSURE: 81 MMHG

## 2020-10-24 VITALS — SYSTOLIC BLOOD PRESSURE: 119 MMHG | DIASTOLIC BLOOD PRESSURE: 73 MMHG

## 2020-10-24 VITALS — SYSTOLIC BLOOD PRESSURE: 127 MMHG | DIASTOLIC BLOOD PRESSURE: 76 MMHG

## 2020-10-24 VITALS — DIASTOLIC BLOOD PRESSURE: 74 MMHG | SYSTOLIC BLOOD PRESSURE: 119 MMHG

## 2020-10-24 VITALS — SYSTOLIC BLOOD PRESSURE: 135 MMHG | DIASTOLIC BLOOD PRESSURE: 89 MMHG

## 2020-10-24 VITALS — DIASTOLIC BLOOD PRESSURE: 73 MMHG | SYSTOLIC BLOOD PRESSURE: 122 MMHG

## 2020-10-24 VITALS — SYSTOLIC BLOOD PRESSURE: 111 MMHG | DIASTOLIC BLOOD PRESSURE: 66 MMHG

## 2020-10-24 VITALS — DIASTOLIC BLOOD PRESSURE: 85 MMHG | SYSTOLIC BLOOD PRESSURE: 128 MMHG

## 2020-10-24 VITALS — SYSTOLIC BLOOD PRESSURE: 128 MMHG | DIASTOLIC BLOOD PRESSURE: 79 MMHG

## 2020-10-24 VITALS — DIASTOLIC BLOOD PRESSURE: 71 MMHG | SYSTOLIC BLOOD PRESSURE: 128 MMHG

## 2020-10-24 VITALS — DIASTOLIC BLOOD PRESSURE: 78 MMHG | SYSTOLIC BLOOD PRESSURE: 128 MMHG

## 2020-10-24 LAB
ALBUMIN SERPL-MCNC: 2.3 G/DL (ref 3.4–5)
ALBUMIN/GLOB SERPL: 0.7 {RATIO} (ref 1–1.7)
ALP SERPL-CCNC: 140 U/L (ref 46–116)
ALT SERPL-CCNC: 417 U/L (ref 14–59)
ANION GAP SERPL CALC-SCNC: 4 MMOL/L (ref 6–14)
AST SERPL-CCNC: 65 U/L (ref 15–37)
BILIRUB SERPL-MCNC: 0.5 MG/DL (ref 0.2–1)
BUN SERPL-MCNC: 16 MG/DL (ref 7–20)
BUN/CREAT SERPL: 27 (ref 6–20)
CALCIUM SERPL-MCNC: 8.5 MG/DL (ref 8.5–10.1)
CHLORIDE SERPL-SCNC: 100 MMOL/L (ref 98–107)
CO2 SERPL-SCNC: 31 MMOL/L (ref 21–32)
CREAT SERPL-MCNC: 0.6 MG/DL (ref 0.6–1)
ERYTHROCYTE [DISTWIDTH] IN BLOOD BY AUTOMATED COUNT: 14.6 % (ref 11.5–14.5)
GFR SERPLBLD BASED ON 1.73 SQ M-ARVRAT: 108.6 ML/MIN
GLUCOSE SERPL-MCNC: 133 MG/DL (ref 70–99)
HCT VFR BLD CALC: 35.6 % (ref 36–47)
HGB BLD-MCNC: 11.6 G/DL (ref 12–15.5)
MCH RBC QN AUTO: 29 PG (ref 25–35)
MCHC RBC AUTO-ENTMCNC: 33 G/DL (ref 31–37)
MCV RBC AUTO: 90 FL (ref 79–100)
PLATELET # BLD AUTO: 306 X10^3/UL (ref 140–400)
POTASSIUM SERPL-SCNC: 4.3 MMOL/L (ref 3.5–5.1)
PROT SERPL-MCNC: 5.7 G/DL (ref 6.4–8.2)
RBC # BLD AUTO: 3.94 X10^6/UL (ref 3.5–5.4)
SODIUM SERPL-SCNC: 135 MMOL/L (ref 136–145)
WBC # BLD AUTO: 16.2 X10^3/UL (ref 4–11)

## 2020-10-24 RX ADMIN — INSULIN GLARGINE SCH UNIT: 100 INJECTION, SOLUTION SUBCUTANEOUS at 23:42

## 2020-10-24 RX ADMIN — PIPERACILLIN SODIUM AND TAZOBACTAM SODIUM SCH MLS/HR: 3; .375 INJECTION, POWDER, LYOPHILIZED, FOR SOLUTION INTRAVENOUS at 05:58

## 2020-10-24 RX ADMIN — HALOPERIDOL LACTATE SCH MG: 5 INJECTION, SOLUTION INTRAMUSCULAR at 05:58

## 2020-10-24 RX ADMIN — METHYLPREDNISOLONE SODIUM SUCCINATE SCH MG: 40 INJECTION, POWDER, FOR SOLUTION INTRAMUSCULAR; INTRAVENOUS at 21:49

## 2020-10-24 RX ADMIN — ENOXAPARIN SODIUM SCH MG: 40 INJECTION SUBCUTANEOUS at 09:48

## 2020-10-24 RX ADMIN — INSULIN LISPRO SCH UNITS: 100 INJECTION, SOLUTION INTRAVENOUS; SUBCUTANEOUS at 09:00

## 2020-10-24 RX ADMIN — METHYLPREDNISOLONE SODIUM SUCCINATE SCH MG: 40 INJECTION, POWDER, FOR SOLUTION INTRAMUSCULAR; INTRAVENOUS at 17:28

## 2020-10-24 RX ADMIN — ENOXAPARIN SODIUM SCH MG: 40 INJECTION SUBCUTANEOUS at 21:48

## 2020-10-24 RX ADMIN — PIPERACILLIN SODIUM AND TAZOBACTAM SODIUM SCH MLS/HR: 3; .375 INJECTION, POWDER, LYOPHILIZED, FOR SOLUTION INTRAVENOUS at 00:26

## 2020-10-24 RX ADMIN — INSULIN GLARGINE SCH UNIT: 100 INJECTION, SOLUTION SUBCUTANEOUS at 09:00

## 2020-10-24 RX ADMIN — POLYETHYLENE GLYCOL 3350 SCH GM: 17 POWDER, FOR SOLUTION ORAL at 09:00

## 2020-10-24 RX ADMIN — DOCUSATE SODIUM SCH MG: 100 CAPSULE, LIQUID FILLED ORAL at 21:19

## 2020-10-24 RX ADMIN — GLYCERIN, ISOLEUCINE, LEUCINE, LYSINE, METHIONINE, PHENYLALANINE, THREONINE, TRYPTOPHAN, VALINE, ALANINE, GLYCINE, ARGININE, HISTIDINE, PROLINE, SERINE, CYSTEINE, SODIUM ACETATE, MAGNESIUM ACETATE, CALCIUM ACETATE, SODIUM CHLORIDE, POTASSIUM CHLORIDE, PHOSPHORIC ACID, AND POTASSIUM METABISULFITE SCH MLS/HR
3; .21; .27; .22; .16; .17; .12; .046; .2; .21; .42; .29; .085; .34; .18; .014; .2; .054; .026; .12; .15; .041 INJECTION INTRAVENOUS at 09:48

## 2020-10-24 RX ADMIN — INSULIN LISPRO SCH UNITS: 100 INJECTION, SOLUTION INTRAVENOUS; SUBCUTANEOUS at 23:56

## 2020-10-24 RX ADMIN — INSULIN LISPRO SCH UNITS: 100 INJECTION, SOLUTION INTRAVENOUS; SUBCUTANEOUS at 17:29

## 2020-10-24 RX ADMIN — HALOPERIDOL LACTATE SCH MG: 5 INJECTION, SOLUTION INTRAMUSCULAR at 17:28

## 2020-10-24 RX ADMIN — LISINOPRIL SCH MG: 5 TABLET ORAL at 09:00

## 2020-10-24 RX ADMIN — INSULIN LISPRO SCH UNITS: 100 INJECTION, SOLUTION INTRAVENOUS; SUBCUTANEOUS at 00:00

## 2020-10-24 RX ADMIN — INSULIN LISPRO SCH UNITS: 100 INJECTION, SOLUTION INTRAVENOUS; SUBCUTANEOUS at 12:00

## 2020-10-24 RX ADMIN — METHYLPREDNISOLONE SODIUM SUCCINATE SCH MG: 40 INJECTION, POWDER, FOR SOLUTION INTRAMUSCULAR; INTRAVENOUS at 06:04

## 2020-10-24 RX ADMIN — PANTOPRAZOLE SODIUM SCH MG: 40 INJECTION, POWDER, FOR SOLUTION INTRAVENOUS at 09:48

## 2020-10-24 RX ADMIN — DOCUSATE SODIUM SCH MG: 100 CAPSULE, LIQUID FILLED ORAL at 09:00

## 2020-10-24 RX ADMIN — INSULIN LISPRO SCH UNITS: 100 INJECTION, SOLUTION INTRAVENOUS; SUBCUTANEOUS at 05:59

## 2020-10-24 RX ADMIN — LINAGLIPTIN SCH MG: 5 TABLET, FILM COATED ORAL at 09:00

## 2020-10-24 RX ADMIN — GLYCERIN, ISOLEUCINE, LEUCINE, LYSINE, METHIONINE, PHENYLALANINE, THREONINE, TRYPTOPHAN, VALINE, ALANINE, GLYCINE, ARGININE, HISTIDINE, PROLINE, SERINE, CYSTEINE, SODIUM ACETATE, MAGNESIUM ACETATE, CALCIUM ACETATE, SODIUM CHLORIDE, POTASSIUM CHLORIDE, PHOSPHORIC ACID, AND POTASSIUM METABISULFITE SCH MLS/HR
3; .21; .27; .22; .16; .17; .12; .046; .2; .21; .42; .29; .085; .34; .18; .014; .2; .054; .026; .12; .15; .041 INJECTION INTRAVENOUS at 21:49

## 2020-10-24 NOTE — NUR
Lantus 30 units SQ barcode not able to scan. Notified pharmacy, new barcode received. Still 
unable to scan. This RN administered Lantus 30 units SQ, verified by LEE Rahman.

## 2020-10-24 NOTE — PN
DATE:  10/24/2020



SUBJECTIVE:  The patient is resting, slightly propped up in bed, no apparent

distress, awake, alert.  She was successfully extubated yesterday.  She is now

maintaining her oxygen saturation at 99% on 4 liters of oxygen by nasal cannula.

 On questioning her, denied any complaint.



OBJECTIVE:

GENERAL:  On examining her, she was pale, but no jaundice, cyanosis, or

thyromegaly.  No jugular venous distension.  No limb edema.

VITAL SIGNS:  Her heart rate was 83, blood pressure was 111/66, temperature

97.8, respiratory rate was 16, and oxygen saturation was 93% on 4 liters of

oxygen.

HEAD, EYES, EARS, NOSE AND THROAT:  Showed normocephalic, atraumatic.

NECK:  Supple.

HEART:  Normal first and second heart sounds.  No gallop or murmur.

CHEST:  Showed central trachea, equal bilateral chest expansion, air entry.  I

could not really appreciate any crepitation or rhonchi.

ABDOMEN:  Distended, soft.

NEUROLOGIC:  She is awake, alert, but somewhat confused.  All her cranial nerves

intact.  She moves extremities without difficulty.



Her intake over the last 24 hours was 3200, output was 4560.



LABORATORY DATA:  As of this morning, her white cell count was 16,011,

hemoglobin 11, hematocrit 35, MCV 90 and platelet count 306,000.  Her chemistry

showed serum sodium was 135, potassium 4.3, chloride 100, bicarbonate 31, anion

gap of 4, BUN 16, creatinine 0.6, estimated GFR was 180 mL per minute.  Her

glucose 133, calcium was 8.5.  Total bilirubin normal; however, AST, ALT,

alkaline phosphatase are elevated, although they are trending down.  Her total

protein was 5.1, albumin was 1.9.  Her D-dimer is down to 1.43.  Her vancomycin

trough level was within therapeutic range.  Her blood cultures remained negative

after 5 days.



ASSESSMENT:

1.  COVID-19 pneumonia.

2.  Acute hypoxic respiratory failure requiring intubation and mechanical

ventilation; however, she was successfully extubated yesterday.  She is now on 3

liters of oxygen, maintaining her oxygen saturations at 93%.

3.  She did spike a temperature up to 101.7.  Her blood cultures are so far

negative.  She continues to be on Zosyn.

4.  The patient has multiple other medical problems including:

A.  Migraine headache.

B.  Type 2 diabetes mellitus.

C.  Fibromyalgia.

D.  Hypertension.

E.  Nephrolithiasis.

F.  Nonalcoholic steatohepatitis.

G.  Obstructive sleep apnea.



PLAN:  To consult the speech therapy to evaluate the swallowing.  The patient

can be transferred to the floor.  Meanwhile, we will continue with steroids as

well as monitor her electrolyte.  Monitor her blood sugar and adjust insulin as

needed.  I will start her on TPN and we might have to cut down her insulin

drastically as she is not now on tube feeding.

 



______________________________

JULIO CISSE MD



DR:  GOKUL/glo  JOB#:  [id number] / [id number]

DD:  10/24/2020 08:44  DT:  10/24/2020 09:08

## 2020-10-24 NOTE — NUR
This RN notified Dr. Becerril in person that pt hasn't had a BM charted since 10/10. Dr. Becerril 
stated he would place orders. Will monitor pt.

## 2020-10-24 NOTE — NUR
Pt transferred to room 671 by bed at approx 1320. Pt remains on 4L NC. Belongings sent with 
pt. This RN attempted to update pt's , Kirill at [phone], no answer. Passed 
along to LEE Porter.

## 2020-10-24 NOTE — NUR
This RN was going to administer Dulcolax suppository to pt, however, pt reported she had a 
BM yesterday and denied need for suppository. Suppository not administered. Pt seems alert 
and oriented with some forgetfulness. Will pass along to receiving nurse, German.

## 2020-10-24 NOTE — PDOC
PULMONARY PROGRESS NOTES


DATE: 10/24/20 


TIME: 10:10


Subjective


Patient did well yesterday, extubated, this morning awake alert following 

commands


No overnight concerns


Vitals





Vital Signs








  Date Time  Temp Pulse Resp B/P (MAP) Pulse Ox O2 Delivery O2 Flow Rate FiO2


 


10/24/20 09:00  83 16 119/74 (89) 94 Nasal Cannula 4.0 


 


10/24/20 07:00 97.8       





 97.8       








Comments


Strong cough


General:  Alert


Lungs:  Clear


Cardiovascular:  S1, S2


Abdomen:  Soft, Other (Obese)


Neuro Exam:  Alert


Extremities:  No Edema, Other (Mild edema)


Skin:  Warm


Labs





Laboratory Tests








Test


 10/22/20


11:17 10/22/20


17:28 10/22/20


23:33 10/23/20


06:00


 


Glucose (Fingerstick)


 162 mg/dL


(70-99) 195 mg/dL


(70-99) 178 mg/dL


(70-99) 160 mg/dL


(70-99)


 


Sodium Level


 


 


 


 138 mmol/L


(136-145)


 


Potassium Level


 


 


 


 4.3 mmol/L


(3.5-5.1)


 


Chloride Level


 


 


 


 101 mmol/L


()


 


Carbon Dioxide Level


 


 


 


 31 mmol/L


(21-32)


 


Anion Gap    6 (6-14) 


 


Blood Urea Nitrogen


 


 


 


 17 mg/dL


(7-20)


 


Creatinine


 


 


 


 0.6 mg/dL


(0.6-1.0)


 


Estimated GFR


(Cockcroft-Gault) 


 


 


 108.6 





 


BUN/Creatinine Ratio    28 (6-20) 


 


Glucose Level


 


 


 


 158 mg/dL


(70-99)


 


Calcium Level


 


 


 


 8.6 mg/dL


(8.5-10.1)


 


Total Bilirubin


 


 


 


 0.5 mg/dL


(0.2-1.0)


 


Aspartate Amino Transf


(AST/SGOT) 


 


 


 206 U/L


(15-37)


 


Alanine Aminotransferase


(ALT/SGPT) 


 


 


 463 U/L


(14-59)


 


Alkaline Phosphatase


 


 


 


 142 U/L


()


 


Total Protein


 


 


 


 5.1 g/dL


(6.4-8.2)


 


Albumin


 


 


 


 1.9 g/dL


(3.4-5.0)


 


Albumin/Globulin Ratio    0.6 (1.0-1.7) 


 


Test


 10/23/20


08:25 10/23/20


11:30 10/23/20


12:31 10/23/20


17:35


 


O2 Saturation 94 % (92-99)  95 % (92-99)   


 


Arterial Blood pH


 7.47


(7.35-7.45) 7.46


(7.35-7.45) 


 





 


Arterial Blood pCO2 at


Patient Temp 37 mmHg


(35-46) 41 mmHg


(35-46) 


 





 


Arterial Blood pO2 at Patient


Temp 73 mmHg


() 75 mmHg


() 


 





 


Arterial Blood HCO3


 27 mmol/L


(21-28) 29 mmol/L


(21-28) 


 





 


Arterial Blood Base Excess


 3 mmol/L


(-3-3) 4 mmol/L


(-3-3) 


 





 


FiO2 50%  40/cpap   


 


Glucose (Fingerstick)


 


 


 190 mg/dL


(70-99) 151 mg/dL


(70-99)


 


Test


 10/23/20


20:30 10/24/20


05:15 10/24/20


05:24 





 


Glucose (Fingerstick)


 167 mg/dL


(70-99) 


 131 mg/dL


(70-99) 





 


White Blood Count


 


 16.2 x10^3/uL


(4.0-11.0) 


 





 


Red Blood Count


 


 3.94 x10^6/uL


(3.50-5.40) 


 





 


Hemoglobin


 


 11.6 g/dL


(12.0-15.5) 


 





 


Hematocrit


 


 35.6 %


(36.0-47.0) 


 





 


Mean Corpuscular Volume  90 fL ()   


 


Mean Corpuscular Hemoglobin  29 pg (25-35)   


 


Mean Corpuscular Hemoglobin


Concent 


 33 g/dL


(31-37) 


 





 


Red Cell Distribution Width


 


 14.6 %


(11.5-14.5) 


 





 


Platelet Count


 


 306 x10^3/uL


(140-400) 


 





 


Sodium Level


 


 135 mmol/L


(136-145) 


 





 


Potassium Level


 


 4.3 mmol/L


(3.5-5.1) 


 





 


Chloride Level


 


 100 mmol/L


() 


 





 


Carbon Dioxide Level


 


 31 mmol/L


(21-32) 


 





 


Anion Gap  4 (6-14)   


 


Blood Urea Nitrogen


 


 16 mg/dL


(7-20) 


 





 


Creatinine


 


 0.6 mg/dL


(0.6-1.0) 


 





 


Estimated GFR


(Cockcroft-Gault) 


 108.6 


 


 





 


BUN/Creatinine Ratio  27 (6-20)   


 


Glucose Level


 


 133 mg/dL


(70-99) 


 





 


Calcium Level


 


 8.5 mg/dL


(8.5-10.1) 


 





 


Total Bilirubin


 


 0.5 mg/dL


(0.2-1.0) 


 





 


Aspartate Amino Transf


(AST/SGOT) 


 65 U/L (15-37) 


 


 





 


Alanine Aminotransferase


(ALT/SGPT) 


 417 U/L


(14-59) 


 





 


Alkaline Phosphatase


 


 140 U/L


() 


 





 


Total Protein


 


 5.7 g/dL


(6.4-8.2) 


 





 


Albumin


 


 2.3 g/dL


(3.4-5.0) 


 





 


Albumin/Globulin Ratio  0.7 (1.0-1.7)   








Laboratory Tests








Test


 10/23/20


11:30 10/23/20


12:31 10/23/20


17:35 10/23/20


20:30


 


O2 Saturation 95 % (92-99)    


 


Arterial Blood pH


 7.46


(7.35-7.45) 


 


 





 


Arterial Blood pCO2 at


Patient Temp 41 mmHg


(35-46) 


 


 





 


Arterial Blood pO2 at Patient


Temp 75 mmHg


() 


 


 





 


Arterial Blood HCO3


 29 mmol/L


(21-28) 


 


 





 


Arterial Blood Base Excess


 4 mmol/L


(-3-3) 


 


 





 


FiO2 40/cpap    


 


Glucose (Fingerstick)


 


 190 mg/dL


(70-99) 151 mg/dL


(70-99) 167 mg/dL


(70-99)


 


Test


 10/24/20


05:15 10/24/20


05:24 


 





 


White Blood Count


 16.2 x10^3/uL


(4.0-11.0) 


 


 





 


Red Blood Count


 3.94 x10^6/uL


(3.50-5.40) 


 


 





 


Hemoglobin


 11.6 g/dL


(12.0-15.5) 


 


 





 


Hematocrit


 35.6 %


(36.0-47.0) 


 


 





 


Mean Corpuscular Volume 90 fL ()    


 


Mean Corpuscular Hemoglobin 29 pg (25-35)    


 


Mean Corpuscular Hemoglobin


Concent 33 g/dL


(31-37) 


 


 





 


Red Cell Distribution Width


 14.6 %


(11.5-14.5) 


 


 





 


Platelet Count


 306 x10^3/uL


(140-400) 


 


 





 


Sodium Level


 135 mmol/L


(136-145) 


 


 





 


Potassium Level


 4.3 mmol/L


(3.5-5.1) 


 


 





 


Chloride Level


 100 mmol/L


() 


 


 





 


Carbon Dioxide Level


 31 mmol/L


(21-32) 


 


 





 


Anion Gap 4 (6-14)    


 


Blood Urea Nitrogen


 16 mg/dL


(7-20) 


 


 





 


Creatinine


 0.6 mg/dL


(0.6-1.0) 


 


 





 


Estimated GFR


(Cockcroft-Gault) 108.6 


 


 


 





 


BUN/Creatinine Ratio 27 (6-20)    


 


Glucose Level


 133 mg/dL


(70-99) 


 


 





 


Calcium Level


 8.5 mg/dL


(8.5-10.1) 


 


 





 


Total Bilirubin


 0.5 mg/dL


(0.2-1.0) 


 


 





 


Aspartate Amino Transf


(AST/SGOT) 65 U/L (15-37) 


 


 


 





 


Alanine Aminotransferase


(ALT/SGPT) 417 U/L


(14-59) 


 


 





 


Alkaline Phosphatase


 140 U/L


() 


 


 





 


Total Protein


 5.7 g/dL


(6.4-8.2) 


 


 





 


Albumin


 2.3 g/dL


(3.4-5.0) 


 


 





 


Albumin/Globulin Ratio 0.7 (1.0-1.7)    


 


Glucose (Fingerstick)


 


 131 mg/dL


(70-99) 


 











Medications





Active Scripts








 Medications  Dose


 Route/Sig


 Max Daily Dose Days Date Category


 


 Cymbalta


  (Duloxetine Hcl)


 20 Mg Capsule.dr  20 Mg


 PO DAILY


   9/22/20 Reported


 


 Lisinopril 5 Mg


 Tablet  5 Mg


 PO DAILY


   9/22/20 Reported


 


 Amitriptyline Hcl


 10 Mg Tablet  10 Mg


 PO DAILY


   9/22/20 Reported


 


 Trulicity


  (Dulaglutide) 1.5


 Mg/0.5 Ml


 Pen.injctr  1.5 Mg


 SQ


   9/22/20 Reported


 


 Nesina


  (Alogliptin


 Benzoate) 12.5 Mg


 Tablet  12.5 Mg


 PO


   9/22/20 Reported


 


 Propranolol Hcl


 80 Mg Tablet  80 Mg


 PO DAILY


   5/14/14 Reported


 


 Cymbalta


  (Duloxetine Hcl)


 60 Mg Capsule.dr  60 Mg


 PO DAILY


   5/14/14 Reported








Comments


CXR 10/17 reviewed


1. ET tube terminates 3.7 cm above the hiren.


2. Lung volumes are lower and show increasing consolidation at the left 


lung base.-


 





CXR 10/19/20


IMPRESSION:


1. Endotracheal tube appears higher or more cephalad position compared to 


the prior study although positioning is somewhat different.


2. Persistent hazy infiltrates








CXR


 10/21


IMPRESSION:


1. Endotracheal tube appears somewhat high above the clavicles.


2. Infiltrates without change.





Impression


.


IMPRESSION:


1.  Acute hypoxemic respiratory failure.-- S/P intubation on 10/12/20 

--improving, extubated 10/23/20


2.  COVID-19 viral pneumonia.


3.  Abnormal chest x-ray, possible gram-positive, gram-negative pneumonia.


4.  Headaches.


5.  Type 2 diabetes.


6.  Fibromyalgia.


7.  Morbid obesity.


8.  Nonalcoholic steatohepatitis.


9.  Obstructive sleep apnea.


10. Abnormal D-Dimer due to COVID, improving 10/15


11. Elevated LF likely 2/2 covid





Plan


.


Okay to transfer out of the intensive care unit


Speech evaluation


PT evaluation


Patient with strong cough should tolerate eating


Follow chest x-ray 


S/P plasma, cont. full course of remdesivir 


Cont. steroids, with taper  


ABX per ID--Zosyn, discontinue


PT/OT/OT


Follow clinical course 


DVT/GI PPX; protonix and BID lovenox 





D/W RN and RT 


Critical care time from 9:45 AM to 10:15 AM











LEWIS DICK MD              Oct 24, 2020 10:15

## 2020-10-24 NOTE — PDOC
SURGICAL PROGRESS NOTE


DATE: 10/24/20 


TIME: 09:01


Subjective


pt extubated





will sign off--please re consult if needed


Vital Signs





Vital Signs








  Date Time  Temp Pulse Resp B/P (MAP) Pulse Ox O2 Delivery O2 Flow Rate FiO2


 


10/24/20 08:29      Nasal Cannula 4.0 


 


10/24/20 08:15  83  111/66 (81) 93   


 


10/24/20 07:00 97.8  16     





 97.8       








I&O











Intake and Output 


 


 10/24/20





 07:00


 


Intake Total 392.19 ml


 


Output Total 3300 ml


 


Balance -2907.81 ml


 


 


 


Intake Oral 0 ml


 


IV Total 242.19 ml


 


Tube Feeding 150 ml


 


Output Urine Total 3300 ml








Labs





Laboratory Tests








Test


 10/22/20


11:17 10/22/20


17:28 10/22/20


23:33 10/23/20


06:00


 


Glucose (Fingerstick)


 162 mg/dL


(70-99) 195 mg/dL


(70-99) 178 mg/dL


(70-99) 160 mg/dL


(70-99)


 


Sodium Level


 


 


 


 138 mmol/L


(136-145)


 


Potassium Level


 


 


 


 4.3 mmol/L


(3.5-5.1)


 


Chloride Level


 


 


 


 101 mmol/L


()


 


Carbon Dioxide Level


 


 


 


 31 mmol/L


(21-32)


 


Anion Gap    6 (6-14) 


 


Blood Urea Nitrogen


 


 


 


 17 mg/dL


(7-20)


 


Creatinine


 


 


 


 0.6 mg/dL


(0.6-1.0)


 


Estimated GFR


(Cockcroft-Gault) 


 


 


 108.6 





 


BUN/Creatinine Ratio    28 (6-20) 


 


Glucose Level


 


 


 


 158 mg/dL


(70-99)


 


Calcium Level


 


 


 


 8.6 mg/dL


(8.5-10.1)


 


Total Bilirubin


 


 


 


 0.5 mg/dL


(0.2-1.0)


 


Aspartate Amino Transf


(AST/SGOT) 


 


 


 206 U/L


(15-37)


 


Alanine Aminotransferase


(ALT/SGPT) 


 


 


 463 U/L


(14-59)


 


Alkaline Phosphatase


 


 


 


 142 U/L


()


 


Total Protein


 


 


 


 5.1 g/dL


(6.4-8.2)


 


Albumin


 


 


 


 1.9 g/dL


(3.4-5.0)


 


Albumin/Globulin Ratio    0.6 (1.0-1.7) 


 


Test


 10/23/20


08:25 10/23/20


11:30 10/23/20


12:31 10/23/20


17:35


 


O2 Saturation 94 % (92-99)  95 % (92-99)   


 


Arterial Blood pH


 7.47


(7.35-7.45) 7.46


(7.35-7.45) 


 





 


Arterial Blood pCO2 at


Patient Temp 37 mmHg


(35-46) 41 mmHg


(35-46) 


 





 


Arterial Blood pO2 at Patient


Temp 73 mmHg


() 75 mmHg


() 


 





 


Arterial Blood HCO3


 27 mmol/L


(21-28) 29 mmol/L


(21-28) 


 





 


Arterial Blood Base Excess


 3 mmol/L


(-3-3) 4 mmol/L


(-3-3) 


 





 


FiO2 50%  40/cpap   


 


Glucose (Fingerstick)


 


 


 190 mg/dL


(70-99) 151 mg/dL


(70-99)


 


Test


 10/23/20


20:30 10/24/20


05:15 10/24/20


05:24 





 


Glucose (Fingerstick)


 167 mg/dL


(70-99) 


 131 mg/dL


(70-99) 





 


White Blood Count


 


 16.2 x10^3/uL


(4.0-11.0) 


 





 


Red Blood Count


 


 3.94 x10^6/uL


(3.50-5.40) 


 





 


Hemoglobin


 


 11.6 g/dL


(12.0-15.5) 


 





 


Hematocrit


 


 35.6 %


(36.0-47.0) 


 





 


Mean Corpuscular Volume  90 fL ()   


 


Mean Corpuscular Hemoglobin  29 pg (25-35)   


 


Mean Corpuscular Hemoglobin


Concent 


 33 g/dL


(31-37) 


 





 


Red Cell Distribution Width


 


 14.6 %


(11.5-14.5) 


 





 


Platelet Count


 


 306 x10^3/uL


(140-400) 


 





 


Sodium Level


 


 135 mmol/L


(136-145) 


 





 


Potassium Level


 


 4.3 mmol/L


(3.5-5.1) 


 





 


Chloride Level


 


 100 mmol/L


() 


 





 


Carbon Dioxide Level


 


 31 mmol/L


(21-32) 


 





 


Anion Gap  4 (6-14)   


 


Blood Urea Nitrogen


 


 16 mg/dL


(7-20) 


 





 


Creatinine


 


 0.6 mg/dL


(0.6-1.0) 


 





 


Estimated GFR


(Cockcroft-Gault) 


 108.6 


 


 





 


BUN/Creatinine Ratio  27 (6-20)   


 


Glucose Level


 


 133 mg/dL


(70-99) 


 





 


Calcium Level


 


 8.5 mg/dL


(8.5-10.1) 


 





 


Total Bilirubin


 


 0.5 mg/dL


(0.2-1.0) 


 





 


Aspartate Amino Transf


(AST/SGOT) 


 65 U/L (15-37) 


 


 





 


Alanine Aminotransferase


(ALT/SGPT) 


 417 U/L


(14-59) 


 





 


Alkaline Phosphatase


 


 140 U/L


() 


 





 


Total Protein


 


 5.7 g/dL


(6.4-8.2) 


 





 


Albumin


 


 2.3 g/dL


(3.4-5.0) 


 





 


Albumin/Globulin Ratio  0.7 (1.0-1.7)   








Laboratory Tests








Test


 10/23/20


11:30 10/23/20


12:31 10/23/20


17:35 10/23/20


20:30


 


O2 Saturation 95 % (92-99)    


 


Arterial Blood pH


 7.46


(7.35-7.45) 


 


 





 


Arterial Blood pCO2 at


Patient Temp 41 mmHg


(35-46) 


 


 





 


Arterial Blood pO2 at Patient


Temp 75 mmHg


() 


 


 





 


Arterial Blood HCO3


 29 mmol/L


(21-28) 


 


 





 


Arterial Blood Base Excess


 4 mmol/L


(-3-3) 


 


 





 


FiO2 40/cpap    


 


Glucose (Fingerstick)


 


 190 mg/dL


(70-99) 151 mg/dL


(70-99) 167 mg/dL


(70-99)


 


Test


 10/24/20


05:15 10/24/20


05:24 


 





 


White Blood Count


 16.2 x10^3/uL


(4.0-11.0) 


 


 





 


Red Blood Count


 3.94 x10^6/uL


(3.50-5.40) 


 


 





 


Hemoglobin


 11.6 g/dL


(12.0-15.5) 


 


 





 


Hematocrit


 35.6 %


(36.0-47.0) 


 


 





 


Mean Corpuscular Volume 90 fL ()    


 


Mean Corpuscular Hemoglobin 29 pg (25-35)    


 


Mean Corpuscular Hemoglobin


Concent 33 g/dL


(31-37) 


 


 





 


Red Cell Distribution Width


 14.6 %


(11.5-14.5) 


 


 





 


Platelet Count


 306 x10^3/uL


(140-400) 


 


 





 


Sodium Level


 135 mmol/L


(136-145) 


 


 





 


Potassium Level


 4.3 mmol/L


(3.5-5.1) 


 


 





 


Chloride Level


 100 mmol/L


() 


 


 





 


Carbon Dioxide Level


 31 mmol/L


(21-32) 


 


 





 


Anion Gap 4 (6-14)    


 


Blood Urea Nitrogen


 16 mg/dL


(7-20) 


 


 





 


Creatinine


 0.6 mg/dL


(0.6-1.0) 


 


 





 


Estimated GFR


(Cockcroft-Gault) 108.6 


 


 


 





 


BUN/Creatinine Ratio 27 (6-20)    


 


Glucose Level


 133 mg/dL


(70-99) 


 


 





 


Calcium Level


 8.5 mg/dL


(8.5-10.1) 


 


 





 


Total Bilirubin


 0.5 mg/dL


(0.2-1.0) 


 


 





 


Aspartate Amino Transf


(AST/SGOT) 65 U/L (15-37) 


 


 


 





 


Alanine Aminotransferase


(ALT/SGPT) 417 U/L


(14-59) 


 


 





 


Alkaline Phosphatase


 140 U/L


() 


 


 





 


Total Protein


 5.7 g/dL


(6.4-8.2) 


 


 





 


Albumin


 2.3 g/dL


(3.4-5.0) 


 


 





 


Albumin/Globulin Ratio 0.7 (1.0-1.7)    


 


Glucose (Fingerstick)


 


 131 mg/dL


(70-99) 


 














Justicifation of Admission Dx:


Justifications for Admission:


Justification of Admission Dx:  Yes


Respiratory Failure:  Mechanical Ventilation











GUILLERMINA CURRY            Oct 24, 2020 09:02

## 2020-10-25 VITALS — DIASTOLIC BLOOD PRESSURE: 72 MMHG | SYSTOLIC BLOOD PRESSURE: 124 MMHG

## 2020-10-25 VITALS — SYSTOLIC BLOOD PRESSURE: 126 MMHG | DIASTOLIC BLOOD PRESSURE: 78 MMHG

## 2020-10-25 VITALS — DIASTOLIC BLOOD PRESSURE: 71 MMHG | SYSTOLIC BLOOD PRESSURE: 128 MMHG

## 2020-10-25 VITALS — DIASTOLIC BLOOD PRESSURE: 81 MMHG | SYSTOLIC BLOOD PRESSURE: 143 MMHG

## 2020-10-25 VITALS — SYSTOLIC BLOOD PRESSURE: 142 MMHG | DIASTOLIC BLOOD PRESSURE: 73 MMHG

## 2020-10-25 VITALS — SYSTOLIC BLOOD PRESSURE: 134 MMHG | DIASTOLIC BLOOD PRESSURE: 81 MMHG

## 2020-10-25 LAB
ANION GAP SERPL CALC-SCNC: 9 MMOL/L (ref 6–14)
BUN SERPL-MCNC: 18 MG/DL (ref 7–20)
CALCIUM SERPL-MCNC: 8.9 MG/DL (ref 8.5–10.1)
CHLORIDE SERPL-SCNC: 96 MMOL/L (ref 98–107)
CO2 SERPL-SCNC: 28 MMOL/L (ref 21–32)
CREAT SERPL-MCNC: 0.8 MG/DL (ref 0.6–1)
GFR SERPLBLD BASED ON 1.73 SQ M-ARVRAT: 77.9 ML/MIN
GLUCOSE SERPL-MCNC: 228 MG/DL (ref 70–99)
POTASSIUM SERPL-SCNC: 4.2 MMOL/L (ref 3.5–5.1)
SODIUM SERPL-SCNC: 133 MMOL/L (ref 136–145)

## 2020-10-25 RX ADMIN — INSULIN GLARGINE SCH UNIT: 100 INJECTION, SOLUTION SUBCUTANEOUS at 22:08

## 2020-10-25 RX ADMIN — HALOPERIDOL LACTATE SCH MG: 5 INJECTION, SOLUTION INTRAMUSCULAR at 22:09

## 2020-10-25 RX ADMIN — METHYLPREDNISOLONE SODIUM SUCCINATE SCH MG: 40 INJECTION, POWDER, FOR SOLUTION INTRAMUSCULAR; INTRAVENOUS at 06:21

## 2020-10-25 RX ADMIN — HALOPERIDOL LACTATE SCH MG: 5 INJECTION, SOLUTION INTRAMUSCULAR at 06:11

## 2020-10-25 RX ADMIN — GLYCERIN, ISOLEUCINE, LEUCINE, LYSINE, METHIONINE, PHENYLALANINE, THREONINE, TRYPTOPHAN, VALINE, ALANINE, GLYCINE, ARGININE, HISTIDINE, PROLINE, SERINE, CYSTEINE, SODIUM ACETATE, MAGNESIUM ACETATE, CALCIUM ACETATE, SODIUM CHLORIDE, POTASSIUM CHLORIDE, PHOSPHORIC ACID, AND POTASSIUM METABISULFITE SCH MLS/HR
3; .21; .27; .22; .16; .17; .12; .046; .2; .21; .42; .29; .085; .34; .18; .014; .2; .054; .026; .12; .15; .041 INJECTION INTRAVENOUS at 10:02

## 2020-10-25 RX ADMIN — LINAGLIPTIN SCH MG: 5 TABLET, FILM COATED ORAL at 09:00

## 2020-10-25 RX ADMIN — DOCUSATE SODIUM SCH MG: 100 CAPSULE, LIQUID FILLED ORAL at 21:44

## 2020-10-25 RX ADMIN — LISINOPRIL SCH MG: 5 TABLET ORAL at 09:00

## 2020-10-25 RX ADMIN — ENOXAPARIN SODIUM SCH MG: 40 INJECTION SUBCUTANEOUS at 21:53

## 2020-10-25 RX ADMIN — INSULIN LISPRO SCH UNITS: 100 INJECTION, SOLUTION INTRAVENOUS; SUBCUTANEOUS at 22:09

## 2020-10-25 RX ADMIN — INSULIN LISPRO SCH UNITS: 100 INJECTION, SOLUTION INTRAVENOUS; SUBCUTANEOUS at 12:38

## 2020-10-25 RX ADMIN — DOCUSATE SODIUM SCH MG: 100 CAPSULE, LIQUID FILLED ORAL at 09:00

## 2020-10-25 RX ADMIN — HALOPERIDOL LACTATE SCH MG: 5 INJECTION, SOLUTION INTRAMUSCULAR at 00:10

## 2020-10-25 RX ADMIN — POLYETHYLENE GLYCOL 3350 SCH GM: 17 POWDER, FOR SOLUTION ORAL at 09:00

## 2020-10-25 RX ADMIN — ENOXAPARIN SODIUM SCH MG: 40 INJECTION SUBCUTANEOUS at 10:01

## 2020-10-25 RX ADMIN — GLYCERIN, ISOLEUCINE, LEUCINE, LYSINE, METHIONINE, PHENYLALANINE, THREONINE, TRYPTOPHAN, VALINE, ALANINE, GLYCINE, ARGININE, HISTIDINE, PROLINE, SERINE, CYSTEINE, SODIUM ACETATE, MAGNESIUM ACETATE, CALCIUM ACETATE, SODIUM CHLORIDE, POTASSIUM CHLORIDE, PHOSPHORIC ACID, AND POTASSIUM METABISULFITE SCH MLS/HR
3; .21; .27; .22; .16; .17; .12; .046; .2; .21; .42; .29; .085; .34; .18; .014; .2; .054; .026; .12; .15; .041 INJECTION INTRAVENOUS at 22:09

## 2020-10-25 RX ADMIN — METHYLPREDNISOLONE SODIUM SUCCINATE SCH MG: 40 INJECTION, POWDER, FOR SOLUTION INTRAMUSCULAR; INTRAVENOUS at 12:35

## 2020-10-25 RX ADMIN — INSULIN GLARGINE SCH UNIT: 100 INJECTION, SOLUTION SUBCUTANEOUS at 09:59

## 2020-10-25 RX ADMIN — HALOPERIDOL LACTATE SCH MG: 5 INJECTION, SOLUTION INTRAMUSCULAR at 14:00

## 2020-10-25 RX ADMIN — INSULIN LISPRO SCH UNITS: 100 INJECTION, SOLUTION INTRAVENOUS; SUBCUTANEOUS at 06:24

## 2020-10-25 RX ADMIN — PANTOPRAZOLE SODIUM SCH MG: 40 INJECTION, POWDER, FOR SOLUTION INTRAVENOUS at 10:00

## 2020-10-25 RX ADMIN — INSULIN LISPRO SCH UNITS: 100 INJECTION, SOLUTION INTRAVENOUS; SUBCUTANEOUS at 18:19

## 2020-10-25 NOTE — PDOC
Infectious Disease Note


Subjective


Subjective


Awake comfortable feeling better





ROS


ROS


No nausea vomiting diarrhea chest pain shortness of breath


Says wants to go home





Vital Sign


Vital Signs





Vital Signs








  Date Time  Temp Pulse Resp B/P (MAP) Pulse Ox O2 Delivery O2 Flow Rate FiO2


 


10/25/20 02:50 98.2 94 36 143/81 (101) 93 Nasal Cannula 4.0 





 98.2       











Physical Exam


PHYSICAL EXAM


GENERAL: Awake comfortable


HEENT:  Normocephalic, atraumatic.  Anicteric.


NECK:  Supple.  No thyromegaly.


LUNGS:  Coarse breath sounds.  No wheezing.


HEART:  S1, S2, no murmurs.


ABDOMEN:  Obese, soft, nontender, bowel sounds present.


EXTREMITIES:  No edema, no cyanosis.


DERMATOLOGIC:  Warm, dry.  No generalized rash.  Multiple tattoos.


CENTRAL NERVOUS SYSTEM: Alert and oriented no focal deficit





Labs


Lab





Laboratory Tests








Test


 10/24/20


12:05 10/24/20


18:38 10/24/20


23:52 10/25/20


04:15


 


Glucose (Fingerstick)


 153 mg/dL


(70-99) 155 mg/dL


(70-99) 264 mg/dL


(70-99) 





 


Sodium Level


 


 


 


 133 mmol/L


(136-145)


 


Potassium Level


 


 


 


 4.2 mmol/L


(3.5-5.1)


 


Chloride Level


 


 


 


 96 mmol/L


()


 


Carbon Dioxide Level


 


 


 


 28 mmol/L


(21-32)


 


Anion Gap    9 (6-14) 


 


Blood Urea Nitrogen


 


 


 


 18 mg/dL


(7-20)


 


Creatinine


 


 


 


 0.8 mg/dL


(0.6-1.0)


 


Estimated GFR


(Cockcroft-Gault) 


 


 


 77.9 





 


Glucose Level


 


 


 


 228 mg/dL


(70-99)


 


Calcium Level


 


 


 


 8.9 mg/dL


(8.5-10.1)


 


Test


 10/25/20


06:19 


 


 





 


Glucose (Fingerstick)


 210 mg/dL


(70-99) 


 


 











Micro





Microbiology


10/15/20 Blood Culture - Preliminary, Resulted


           NO GROWTH AFTER 1 DAY





Objective


Assessment


1.  Acute hypoxic respiratory failure.


2.  COVID-19 Pneumonia


3.  Migraine headaches.


4.  Diabetes.


5.  Obesity.


6.  Fibromyalgia.


7.  Abnormal LFTs.





Plan


Plan of Care


 steroids can be discontinued or tapered





supportive care





PT OT











HEDY BRAND MD               Oct 25, 2020 07:31

## 2020-10-25 NOTE — PDOC
PULMONARY PROGRESS NOTES


DATE: 10/25/20 


TIME: 10:36


Subjective


Patient is on 4 L nasal cannula, denies any shortness of breath, cough or chest 

pain


Continues to have a very weak voice and overall weakness


No overnight concerns from nursing


A-Febrile


Vitals





Vital Signs








  Date Time  Temp Pulse Resp B/P (MAP) Pulse Ox O2 Delivery O2 Flow Rate FiO2


 


10/25/20 09:00  99  128/71    


 


10/25/20 07:14 97.6  20  95 Nasal Cannula 4.0 





 97.6       








Comments


Strong cough


General:  Alert


Lungs:  Clear


Cardiovascular:  S1, S2


Abdomen:  Soft, Other (Obese)


Neuro Exam:  Alert


Extremities:  No Edema, Other (Mild edema)


Skin:  Warm


Labs





Laboratory Tests








Test


 10/23/20


11:30 10/23/20


12:31 10/23/20


17:35 10/23/20


20:30


 


O2 Saturation 95 % (92-99)    


 


Arterial Blood pH


 7.46


(7.35-7.45) 


 


 





 


Arterial Blood pCO2 at


Patient Temp 41 mmHg


(35-46) 


 


 





 


Arterial Blood pO2 at Patient


Temp 75 mmHg


() 


 


 





 


Arterial Blood HCO3


 29 mmol/L


(21-28) 


 


 





 


Arterial Blood Base Excess


 4 mmol/L


(-3-3) 


 


 





 


FiO2 40/cpap    


 


Glucose (Fingerstick)


 


 190 mg/dL


(70-99) 151 mg/dL


(70-99) 167 mg/dL


(70-99)


 


Test


 10/24/20


05:15 10/24/20


05:24 10/24/20


12:05 10/24/20


18:38


 


White Blood Count


 16.2 x10^3/uL


(4.0-11.0) 


 


 





 


Red Blood Count


 3.94 x10^6/uL


(3.50-5.40) 


 


 





 


Hemoglobin


 11.6 g/dL


(12.0-15.5) 


 


 





 


Hematocrit


 35.6 %


(36.0-47.0) 


 


 





 


Mean Corpuscular Volume 90 fL ()    


 


Mean Corpuscular Hemoglobin 29 pg (25-35)    


 


Mean Corpuscular Hemoglobin


Concent 33 g/dL


(31-37) 


 


 





 


Red Cell Distribution Width


 14.6 %


(11.5-14.5) 


 


 





 


Platelet Count


 306 x10^3/uL


(140-400) 


 


 





 


Sodium Level


 135 mmol/L


(136-145) 


 


 





 


Potassium Level


 4.3 mmol/L


(3.5-5.1) 


 


 





 


Chloride Level


 100 mmol/L


() 


 


 





 


Carbon Dioxide Level


 31 mmol/L


(21-32) 


 


 





 


Anion Gap 4 (6-14)    


 


Blood Urea Nitrogen


 16 mg/dL


(7-20) 


 


 





 


Creatinine


 0.6 mg/dL


(0.6-1.0) 


 


 





 


Estimated GFR


(Cockcroft-Gault) 108.6 


 


 


 





 


BUN/Creatinine Ratio 27 (6-20)    


 


Glucose Level


 133 mg/dL


(70-99) 


 


 





 


Calcium Level


 8.5 mg/dL


(8.5-10.1) 


 


 





 


Total Bilirubin


 0.5 mg/dL


(0.2-1.0) 


 


 





 


Aspartate Amino Transf


(AST/SGOT) 65 U/L (15-37) 


 


 


 





 


Alanine Aminotransferase


(ALT/SGPT) 417 U/L


(14-59) 


 


 





 


Alkaline Phosphatase


 140 U/L


() 


 


 





 


Total Protein


 5.7 g/dL


(6.4-8.2) 


 


 





 


Albumin


 2.3 g/dL


(3.4-5.0) 


 


 





 


Albumin/Globulin Ratio 0.7 (1.0-1.7)    


 


Glucose (Fingerstick)


 


 131 mg/dL


(70-99) 153 mg/dL


(70-99) 155 mg/dL


(70-99)


 


Test


 10/24/20


23:52 10/25/20


04:15 10/25/20


06:19 10/25/20


07:56


 


Glucose (Fingerstick)


 264 mg/dL


(70-99) 


 210 mg/dL


(70-99) 214 mg/dL


(70-99)


 


Sodium Level


 


 133 mmol/L


(136-145) 


 





 


Potassium Level


 


 4.2 mmol/L


(3.5-5.1) 


 





 


Chloride Level


 


 96 mmol/L


() 


 





 


Carbon Dioxide Level


 


 28 mmol/L


(21-32) 


 





 


Anion Gap  9 (6-14)   


 


Blood Urea Nitrogen


 


 18 mg/dL


(7-20) 


 





 


Creatinine


 


 0.8 mg/dL


(0.6-1.0) 


 





 


Estimated GFR


(Cockcroft-Gault) 


 77.9 


 


 





 


Glucose Level


 


 228 mg/dL


(70-99) 


 





 


Calcium Level


 


 8.9 mg/dL


(8.5-10.1) 


 











Laboratory Tests








Test


 10/24/20


12:05 10/24/20


18:38 10/24/20


23:52 10/25/20


04:15


 


Glucose (Fingerstick)


 153 mg/dL


(70-99) 155 mg/dL


(70-99) 264 mg/dL


(70-99) 





 


Sodium Level


 


 


 


 133 mmol/L


(136-145)


 


Potassium Level


 


 


 


 4.2 mmol/L


(3.5-5.1)


 


Chloride Level


 


 


 


 96 mmol/L


()


 


Carbon Dioxide Level


 


 


 


 28 mmol/L


(21-32)


 


Anion Gap    9 (6-14) 


 


Blood Urea Nitrogen


 


 


 


 18 mg/dL


(7-20)


 


Creatinine


 


 


 


 0.8 mg/dL


(0.6-1.0)


 


Estimated GFR


(Cockcroft-Gault) 


 


 


 77.9 





 


Glucose Level


 


 


 


 228 mg/dL


(70-99)


 


Calcium Level


 


 


 


 8.9 mg/dL


(8.5-10.1)


 


Test


 10/25/20


06:19 10/25/20


07:56 


 





 


Glucose (Fingerstick)


 210 mg/dL


(70-99) 214 mg/dL


(70-99) 


 











Medications





Active Scripts








 Medications  Dose


 Route/Sig


 Max Daily Dose Days Date Category


 


 Cymbalta


  (Duloxetine Hcl)


 20 Mg Capsule.dr  20 Mg


 PO DAILY


   9/22/20 Reported


 


 Lisinopril 5 Mg


 Tablet  5 Mg


 PO DAILY


   9/22/20 Reported


 


 Amitriptyline Hcl


 10 Mg Tablet  10 Mg


 PO DAILY


   9/22/20 Reported


 


 Trulicity


  (Dulaglutide) 1.5


 Mg/0.5 Ml


 Pen.injctr  1.5 Mg


 SQ


   9/22/20 Reported


 


 Nesina


  (Alogliptin


 Benzoate) 12.5 Mg


 Tablet  12.5 Mg


 PO


   9/22/20 Reported


 


 Propranolol Hcl


 80 Mg Tablet  80 Mg


 PO DAILY


   5/14/14 Reported


 


 Cymbalta


  (Duloxetine Hcl)


 60 Mg Capsule.dr  60 Mg


 PO DAILY


   5/14/14 Reported








Comments


CXR 10/23 -- no change





Impression


.


IMPRESSION:


1.  Acute hypoxemic respiratory failure.-- S/P intubation on 10/12/20 

--improving, extubated 10/23/20


2.  COVID-19 viral pneumonia.


3.  Abnormal chest x-ray, possible gram-positive, gram-negative pneumonia.


4.  Headaches.


5.  Type 2 diabetes.


6.  Fibromyalgia.


7.  Morbid obesity.


8.  Nonalcoholic steatohepatitis.


9.  Obstructive sleep apnea.


10. Abnormal D-Dimer due to COVID, improving 10/15


11. Elevated LF likely 2/2 covid





Plan


.


Continue supplemental oxygen as needed to keep oxygen saturations greater than 

92%


Physical therapy/Occupational Therapy/speech therapy


Currently n.p.o., continue PPN for nutritional support


Follow chest x-ray as needed


Status post plasma and remdesivir


IV steroids to daily dosing, plan to DC on Monday


Follow infectious disease recommendations for antibiotics, currently not on 

antibiotic therapy


DVT/GI prophylaxis, Protonix and Lovenox





Discussed with LEWIS BULLOCK MD              Oct 25, 2020 10:38

## 2020-10-25 NOTE — PN
DATE:  10/25/2020



SUBJECTIVE:  The patient is resting slightly propped up in bed, in no apparent

respiratory distress.  She is awake, alert, somewhat slow to respond.  She

denied any complaint; however, the nursing staff stated that she continued to be

extremely weak.  She managed to stand yesterday and pivot to the chair for about

half an hour and then she wanted to come back to the bed.  She was not seen yet

by the speech therapy as she continued to be on procalamine, the nursing staff

also noted that she has rash on her chest wall.  They also describes another

rash in her lower extremities, although I have not really been able to see it

myself.



PHYSICAL EXAMINATION:

GENERAL:  When I examined her, she looked flushed, there was no jaundice,

cyanosis or thyromegaly.  No jugular venous distention or limb edema.

VITAL SIGNS:  Her heart rate was 99, blood pressure was 128/71, her temperature

was 97.6, respiratory rate 20, and oxygen saturation was 95% on 4 liters of

oxygen.

HEAD, EYES, EARS, NOSE AND THROAT:  Normocephalic, atraumatic.

NECK:  Supple.

HEART:  Showed normal first and second heart sounds.  No gallop, rub or murmur.

CHEST:  Clear to auscultation.  No crepitation or rhonchi.

ABDOMEN:  Distended, soft, nontender.

NEUROLOGIC:  She is awake, alert, though slow to respond.  All her cranial

nerves are intact.  She moves extremities without difficulty, but she continued

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



Her intake over the last 24 hours was 3900, output was 3300.



LABORATORY DATA:  As of this morning her chemistry showed a serum sodium 133,

potassium 4.2, chloride 96, bicarbonate 28, anion gap of 9, BUN 18, creatinine

was 0.8, estimated GFR was 77, glucose was 228 and calcium was 8.9.  Her blood

cultures are so far negative.



ASSESSMENT:

1.  COVID-19 pneumonia.

2.  Acute hypoxic respiratory failure requiring intubation and mechanical

ventilation; however, she was successfully extubated.  She is now on 3 liters of

oxygen, maintaining her oxygen saturation at 93%.

3.  She did spike a temperature up to 101.7.  Her blood cultures are so far

negative.  Both antibiotics were discontinued.

4.  The patient has multiple other medical problems including:

A.  Migraine headache.

B.  Type 2 diabetes mellitus.

C.  Fibromyalgia.

D.  Hypertension.

E.  Nephrolithiasis.

F.  Nonalcoholic steatohepatitis.

G.  Obstructive sleep apnea.



PLAN:  To continue with n.p.o. status.  Continue with procalamine.  Continue

with tapering down the steroids.  Continue to monitor blood sugar and adjust

insulin as needed.  I cut down her insulin dramatically.  Continue GI

prophylaxis and DVT prophylaxis.  Continue with physical and occupational

therapy.

 



______________________________

JULIO CISSE MD



DR:  [id number]  JOB#:  [id number] / [id number]

DD:  10/25/2020 11:39  DT:  10/25/2020 12:20

## 2020-10-26 VITALS — SYSTOLIC BLOOD PRESSURE: 112 MMHG | DIASTOLIC BLOOD PRESSURE: 63 MMHG

## 2020-10-26 VITALS — DIASTOLIC BLOOD PRESSURE: 54 MMHG | SYSTOLIC BLOOD PRESSURE: 119 MMHG

## 2020-10-26 VITALS — SYSTOLIC BLOOD PRESSURE: 120 MMHG | DIASTOLIC BLOOD PRESSURE: 70 MMHG

## 2020-10-26 VITALS — DIASTOLIC BLOOD PRESSURE: 62 MMHG | SYSTOLIC BLOOD PRESSURE: 104 MMHG

## 2020-10-26 VITALS — SYSTOLIC BLOOD PRESSURE: 116 MMHG | DIASTOLIC BLOOD PRESSURE: 73 MMHG

## 2020-10-26 LAB
ALBUMIN SERPL-MCNC: 2.5 G/DL (ref 3.4–5)
ALBUMIN/GLOB SERPL: 0.7 {RATIO} (ref 1–1.7)
ALP SERPL-CCNC: 132 U/L (ref 46–116)
ALT SERPL-CCNC: 295 U/L (ref 14–59)
ANION GAP SERPL CALC-SCNC: 9 MMOL/L (ref 6–14)
AST SERPL-CCNC: 53 U/L (ref 15–37)
BILIRUB SERPL-MCNC: 0.5 MG/DL (ref 0.2–1)
BUN SERPL-MCNC: 19 MG/DL (ref 7–20)
BUN/CREAT SERPL: 32 (ref 6–20)
CALCIUM SERPL-MCNC: 8.9 MG/DL (ref 8.5–10.1)
CHLORIDE SERPL-SCNC: 99 MMOL/L (ref 98–107)
CO2 SERPL-SCNC: 27 MMOL/L (ref 21–32)
CREAT SERPL-MCNC: 0.6 MG/DL (ref 0.6–1)
ERYTHROCYTE [DISTWIDTH] IN BLOOD BY AUTOMATED COUNT: 15.6 % (ref 11.5–14.5)
GFR SERPLBLD BASED ON 1.73 SQ M-ARVRAT: 108.6 ML/MIN
GLUCOSE SERPL-MCNC: 245 MG/DL (ref 70–99)
HCT VFR BLD CALC: 37.8 % (ref 36–47)
HGB BLD-MCNC: 12.4 G/DL (ref 12–15.5)
MCH RBC QN AUTO: 30 PG (ref 25–35)
MCHC RBC AUTO-ENTMCNC: 33 G/DL (ref 31–37)
MCV RBC AUTO: 92 FL (ref 79–100)
PLATELET # BLD AUTO: 324 X10^3/UL (ref 140–400)
POTASSIUM SERPL-SCNC: 4.2 MMOL/L (ref 3.5–5.1)
PROT SERPL-MCNC: 6.1 G/DL (ref 6.4–8.2)
RBC # BLD AUTO: 4.12 X10^6/UL (ref 3.5–5.4)
SODIUM SERPL-SCNC: 135 MMOL/L (ref 136–145)
WBC # BLD AUTO: 17.1 X10^3/UL (ref 4–11)

## 2020-10-26 RX ADMIN — PANTOPRAZOLE SODIUM SCH MG: 40 INJECTION, POWDER, FOR SOLUTION INTRAVENOUS at 10:19

## 2020-10-26 RX ADMIN — ENOXAPARIN SODIUM SCH MG: 40 INJECTION SUBCUTANEOUS at 10:23

## 2020-10-26 RX ADMIN — HALOPERIDOL LACTATE SCH MG: 5 INJECTION, SOLUTION INTRAMUSCULAR at 15:04

## 2020-10-26 RX ADMIN — METHYLPREDNISOLONE SODIUM SUCCINATE SCH MG: 40 INJECTION, POWDER, FOR SOLUTION INTRAMUSCULAR; INTRAVENOUS at 10:19

## 2020-10-26 RX ADMIN — DOCUSATE SODIUM SCH MG: 100 CAPSULE, LIQUID FILLED ORAL at 21:00

## 2020-10-26 RX ADMIN — INSULIN LISPRO SCH UNITS: 100 INJECTION, SOLUTION INTRAVENOUS; SUBCUTANEOUS at 17:01

## 2020-10-26 RX ADMIN — GLYCERIN, ISOLEUCINE, LEUCINE, LYSINE, METHIONINE, PHENYLALANINE, THREONINE, TRYPTOPHAN, VALINE, ALANINE, GLYCINE, ARGININE, HISTIDINE, PROLINE, SERINE, CYSTEINE, SODIUM ACETATE, MAGNESIUM ACETATE, CALCIUM ACETATE, SODIUM CHLORIDE, POTASSIUM CHLORIDE, PHOSPHORIC ACID, AND POTASSIUM METABISULFITE SCH MLS/HR
3; .21; .27; .22; .16; .17; .12; .046; .2; .21; .42; .29; .085; .34; .18; .014; .2; .054; .026; .12; .15; .041 INJECTION INTRAVENOUS at 22:50

## 2020-10-26 RX ADMIN — LISINOPRIL SCH MG: 5 TABLET ORAL at 10:20

## 2020-10-26 RX ADMIN — HALOPERIDOL LACTATE SCH MG: 5 INJECTION, SOLUTION INTRAMUSCULAR at 22:49

## 2020-10-26 RX ADMIN — LINAGLIPTIN SCH MG: 5 TABLET, FILM COATED ORAL at 10:20

## 2020-10-26 RX ADMIN — HALOPERIDOL LACTATE SCH MG: 5 INJECTION, SOLUTION INTRAMUSCULAR at 05:28

## 2020-10-26 RX ADMIN — INSULIN GLARGINE SCH UNIT: 100 INJECTION, SOLUTION SUBCUTANEOUS at 22:50

## 2020-10-26 RX ADMIN — INSULIN LISPRO SCH UNITS: 100 INJECTION, SOLUTION INTRAVENOUS; SUBCUTANEOUS at 13:42

## 2020-10-26 RX ADMIN — DOCUSATE SODIUM SCH MG: 100 CAPSULE, LIQUID FILLED ORAL at 10:53

## 2020-10-26 RX ADMIN — INSULIN GLARGINE SCH UNIT: 100 INJECTION, SOLUTION SUBCUTANEOUS at 10:24

## 2020-10-26 RX ADMIN — INSULIN LISPRO SCH UNITS: 100 INJECTION, SOLUTION INTRAVENOUS; SUBCUTANEOUS at 22:55

## 2020-10-26 RX ADMIN — POLYETHYLENE GLYCOL 3350 SCH GM: 17 POWDER, FOR SOLUTION ORAL at 10:54

## 2020-10-26 RX ADMIN — GLYCERIN, ISOLEUCINE, LEUCINE, LYSINE, METHIONINE, PHENYLALANINE, THREONINE, TRYPTOPHAN, VALINE, ALANINE, GLYCINE, ARGININE, HISTIDINE, PROLINE, SERINE, CYSTEINE, SODIUM ACETATE, MAGNESIUM ACETATE, CALCIUM ACETATE, SODIUM CHLORIDE, POTASSIUM CHLORIDE, PHOSPHORIC ACID, AND POTASSIUM METABISULFITE SCH MLS/HR
3; .21; .27; .22; .16; .17; .12; .046; .2; .21; .42; .29; .085; .34; .18; .014; .2; .054; .026; .12; .15; .041 INJECTION INTRAVENOUS at 10:23

## 2020-10-26 RX ADMIN — INSULIN LISPRO SCH UNITS: 100 INJECTION, SOLUTION INTRAVENOUS; SUBCUTANEOUS at 10:25

## 2020-10-26 RX ADMIN — ENOXAPARIN SODIUM SCH MG: 40 INJECTION SUBCUTANEOUS at 22:48

## 2020-10-26 NOTE — PDOC
PULMONARY PROGRESS NOTES


DATE: 10/26/20 


TIME: 08:52


Subjective


Patient weak currently on oxygen supplementation weak cough


Vitals





Vital Signs








  Date Time  Temp Pulse Resp B/P (MAP) Pulse Ox O2 Delivery O2 Flow Rate FiO2


 


10/26/20 03:54 98.0 110 16 120/70 (87) 90 Nasal Cannula 4.0 





 98.0       








Comments


Strong cough


General:  Alert


Lungs:  Clear


Cardiovascular:  S1, S2


Abdomen:  Soft, Other (Obese)


Neuro Exam:  Alert


Extremities:  No Edema, Other (Mild edema)


Skin:  Warm


Labs





Laboratory Tests








Test


 10/24/20


12:05 10/24/20


18:38 10/24/20


23:52 10/25/20


04:15


 


Glucose (Fingerstick)


 153 mg/dL


(70-99) 155 mg/dL


(70-99) 264 mg/dL


(70-99) 





 


Sodium Level


 


 


 


 133 mmol/L


(136-145)


 


Potassium Level


 


 


 


 4.2 mmol/L


(3.5-5.1)


 


Chloride Level


 


 


 


 96 mmol/L


()


 


Carbon Dioxide Level


 


 


 


 28 mmol/L


(21-32)


 


Anion Gap    9 (6-14) 


 


Blood Urea Nitrogen


 


 


 


 18 mg/dL


(7-20)


 


Creatinine


 


 


 


 0.8 mg/dL


(0.6-1.0)


 


Estimated GFR


(Cockcroft-Gault) 


 


 


 77.9 





 


Glucose Level


 


 


 


 228 mg/dL


(70-99)


 


Calcium Level


 


 


 


 8.9 mg/dL


(8.5-10.1)


 


Test


 10/25/20


06:19 10/25/20


07:56 10/25/20


11:38 10/25/20


20:58


 


Glucose (Fingerstick)


 210 mg/dL


(70-99) 214 mg/dL


(70-99) 247 mg/dL


(70-99) 251 mg/dL


(70-99)


 


Test


 10/26/20


08:23 


 


 





 


Glucose (Fingerstick)


 198 mg/dL


(70-99) 


 


 











Laboratory Tests








Test


 10/25/20


11:38 10/25/20


20:58 10/26/20


08:23


 


Glucose (Fingerstick)


 247 mg/dL


(70-99) 251 mg/dL


(70-99) 198 mg/dL


(70-99)








Medications





Active Scripts








 Medications  Dose


 Route/Sig


 Max Daily Dose Days Date Category


 


 Cymbalta


  (Duloxetine Hcl)


 20 Mg Capsule.dr  20 Mg


 PO DAILY


   9/22/20 Reported


 


 Lisinopril 5 Mg


 Tablet  5 Mg


 PO DAILY


   9/22/20 Reported


 


 Amitriptyline Hcl


 10 Mg Tablet  10 Mg


 PO DAILY


   9/22/20 Reported


 


 Trulicity


  (Dulaglutide) 1.5


 Mg/0.5 Ml


 Pen.injctr  1.5 Mg


 SQ


   9/22/20 Reported


 


 Nesina


  (Alogliptin


 Benzoate) 12.5 Mg


 Tablet  12.5 Mg


 PO


   9/22/20 Reported


 


 Propranolol Hcl


 80 Mg Tablet  80 Mg


 PO DAILY


   5/14/14 Reported


 


 Cymbalta


  (Duloxetine Hcl)


 60 Mg Capsule.dr  60 Mg


 PO DAILY


   5/14/14 Reported








Comments


CXR 10/23 -- no change





Impression


.


IMPRESSION:


1.  Acute hypoxemic respiratory failure.-- S/P intubation on 10/12/20 

--improving, extubated 10/23/20


2.  COVID-19 viral pneumonia.


3.  Abnormal chest x-ray, possible gram-positive, gram-negative pneumonia.


4.  Headaches.


5.  Type 2 diabetes.


6.  Fibromyalgia.


7.  Morbid obesity.


8.  Nonalcoholic steatohepatitis.


9.  Obstructive sleep apnea.


10. Abnormal D-Dimer due to COVID, improving 10/15


11. Elevated LF likely 2/2 covid





Plan


.


Than likely will need LTAC, will ask social service to look into it


Continue supplemental oxygen as needed to keep oxygen saturations greater than 

92%


Physical therapy/Occupational Therapy/speech therapy


Currently n.p.o., continue PPN for nutritional support


Follow chest x-ray as needed


Status post plasma and remdesivir


DC steroids


Follow infectious disease recommendations for antibiotics, currently not on 

antibiotic therapy


DVT/GI prophylaxis, Protonix and Lovenox





Discussed with LEWIS BULLOCK MD              Oct 26, 2020 08:52

## 2020-10-26 NOTE — PN
DATE:  10/26/2020



SUBJECTIVE:  The patient is resting, slightly propped up in bed, in no apparent

respiratory distress.  She is awake, alert.  Still has hoarseness of voice.  She

continues to be extremely weak.  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

she cannot even feed herself.  She has eaten her breakfast this morning.  She

was fed by the nursing staff.



PHYSICAL EXAMINATION:

GENERAL:  When I examined her this morning, she was somewhat flushed, but there

was no jaundice, cyanosis, or thyromegaly.  No jugular venous distention.  No

lower limb edema.

VITAL SIGNS:  Her heart rate was up to 121, blood pressure was 112/63,

temperature was 97.9, respiratory rate was 21 and oxygen saturation was 96% on 4

liters of oxygen.

HEAD, EYES, EARS, NOSE, AND THROAT:  Showed normocephalic, atraumatic.

NECK:  Supple.

HEART:  Showed normal first and second heart sounds.  No gallop, rub or murmur.

CHEST:  Clear to auscultation.  No crepitation or rhonchi.

ABDOMEN:  Distended, soft, nontender.

NEUROLOGIC:  She is awake, alert, responding appropriately.  All her cranial

nerves intact.  She is able to move her extremities, but she has marked weakness

in all 4 extremities, such that she cannot even feed herself.



Her intake over the last 24 hours was 215, output was 3525.



LABORATORY DATA:  Her lab work this morning:  Her blood sugar was well

controlled.  As of yesterday, her serum sodium was 133, potassium 4.2, chloride

96, bicarbonate 28, anion gap of 9, BUN 18, creatinine 0.8, estimated GFR was 77

mL per minute.  Her glucose was 228 and calcium was 8.9.  Her most recent white

cell count was 16,200; hemoglobin 11.6; hematocrit 35.6; MCV 90 and platelet

count 306,000.



ASSESSMENT:

1.  COVID-19 pneumonia.

2.  Acute hypoxic respiratory failure requiring intubation and mechanical

ventilation; however, she was successfully extubated.  She is now on 4 liters of

oxygen, maintaining her oxygen saturation at 96%.

3.  She did spike her temperature up to 101.7.  Her blood cultures are so far

negative.  She was on Zosyn and Zyvox and vancomycin, and in fact, both the

antibiotics were discontinued.

4.  The patient has multiple other medical problems, including:

A.  Migraine headache.

B.  Type 2 diabetes mellitus.

C.  Fibromyalgia.

D.  Hypertension.

E.  Nephrolithiasis.

F.  Nonalcoholic steatohepatitis.

G.  Obstructive sleep apnea.



The patient was able to eat, but she was not able to feed herself.  Continue to

monitor blood sugar and adjust insulin as needed.  I cut down her insulin

dramatically.  Continue GI prophylaxis and DVT prophylaxis.  Continue with

physical and occupational therapy.  I will repeat all her labs again, and for

some reason, she is tachycardic.  Her imaging this morning showed that she has

the heart size within normal limits.  Minimal left apical pleural thickening is

present.  Patchy interstitial infiltrate involving the lung fields are present

and mild improvement induration compared to previous exam is present.  No

significant pleural effusion or pneumothorax.  She has right subclavian catheter

that terminates over the left brachiocephalic vein and this is similar to prior

exam and the impression is the patient has improved pulmonary aeration.  I will

swab her again for COVID.  I will repeat all her lab work and decide on further

management accordingly.

 



______________________________

JULIO CISSE MD



DR:  GOKUL/glo  JOB#:  [id number] / [id number]

DD:  10/26/2020 11:10  DT:  10/26/2020 11:26

## 2020-10-26 NOTE — PDOC
Infectious Disease Note


Subjective:


Subjective


Awake comfortable 


feeling better


Denies fever, nausea, vomiting, shortness of breath, diarrhea, abdominal pain, 

rash


Otherwise as above





Vital Signs:


Vital Signs





Vital Signs








  Date Time  Temp Pulse Resp B/P (MAP) Pulse Ox O2 Delivery O2 Flow Rate FiO2


 


10/26/20 03:54 98.0 110 16 120/70 (87) 90 Nasal Cannula 4.0 





 98.0       











Physical Exam:


PHYSICAL EXAM


GENERAL: Awake comfortable


HEENT:  Normocephalic, atraumatic.  Anicteric.


NECK:  Supple.  No thyromegaly.


LUNGS:  Coarse breath sounds.  No wheezing.


HEART:  S1, S2, no murmurs.


ABDOMEN:  Obese, soft, nontender, bowel sounds present.


EXTREMITIES:  No edema, no cyanosis.


DERMATOLOGIC:  Warm, dry.  No generalized rash.  Multiple tattoos.


CENTRAL NERVOUS SYSTEM: Alert and oriented no focal deficit





Medications:


Inpatient Meds:





Current Medications








 Medications


  (Trade)  Dose


 Ordered  Sig/Angelic  Start Time


 Stop Time Status Last Admin


Dose Admin


 


 Acetaminophen


  (Tylenol)  650 mg  PRN Q6HRS  PRN  10/15/20 20:30


    10/17/20 05:07


650 MG


 


 Acetaminophen/


 Aspirin/Caffeine


  (Excedrin


 Migraine)  2 tab  PRN Q6HRS  PRN  10/10/20 16:15


     





 


 Acetaminophen/


 Hydrocodone Bitart


  (Lortab 5/325)  1 tab  PRN Q6HRS  PRN  10/10/20 16:15


    10/12/20 13:54


1 TAB


 


 Amino Acids/


 Glycerin/


 Electrolytes  1,000 ml @ 


 80 mls/hr  [id number]  10/24/20 09:00


    10/25/20 22:09


80 MLS/HR


 


 Amitriptyline HCl


  (Elavil)  75 mg  PRN QHS  PRN  10/10/20 16:15


     





 


 Atropine Sulfate


  (ATROPINE 0.5mg


 SYRINGE)  0.5 mg  PRN Q5MIN  PRN  10/23/20 10:45


     





 


 Bisacodyl


  (Dulcolax Supp)  10 mg  PRN DAILY  PRN  10/24/20 09:00


     





 


 Calcium Carbonate/


 Glycine


  (Tums)  1,000 mg  PRN Q4HRS  PRN  10/12/20 10:45


    10/12/20 10:46


1,000 MG


 


 Ceftriaxone Sodium


  (Rocephin)  1 gm  Q24H  10/11/20 10:00


 10/16/20 14:05 DC 10/16/20 10:19


1 GM


 


 Dexmedetomidine


 HCl 400 mcg/


 Sodium Chloride  100 ml @ 0


 mls/hr  CONT  PRN  10/23/20 10:45


 10/24/20 10:13 DC  





 


 Dextrose


  (Dextrose


 50%-Water Syringe)  12.5 gm  PRN Q15MIN  PRN  10/11/20 10:15


   UNV  





 


 Docusate Sodium


  (Colace)  100 mg  BID  10/24/20 09:00


     





 


 Duloxetine HCl


  (Cymbalta)  60 mg  BID  10/10/20 21:00


    10/25/20 21:52


60 MG


 


 Enoxaparin Sodium


  (Lovenox 100mg


 Syringe)  100 mg  Q12HR  10/11/20 11:00


 10/11/20 12:53 DC 10/11/20 10:57


100 MG


 


 Enoxaparin Sodium


  (Lovenox 40mg


 Syringe)  40 mg  Q12HR  10/11/20 21:00


    10/25/20 21:53


40 MG


 


 Famotidine


  (Pepcid Vial)  20 mg  BID  10/13/20 09:00


 10/13/20 07:56 DC  





 


 Fentanyl Citrate  55 ml @ 0


 mls/hr  CONT  PRN  10/19/20 10:15


 10/24/20 10:13 DC 10/22/20 13:14


2 MLS/HR


 


 Haloperidol


 Lactate


  (Haldol Inj)  5 mg  Q8HRS  10/21/20 09:45


    10/25/20 14:00


5 MG


 


 Insulin Glargine


  (Lantus Syringe)  30 unit  BID  10/24/20 09:00


    10/25/20 22:08


30 UNIT


 


 Insulin Human


 Lispro


  (HumaLOG)  10 units  TIDACHC  10/26/20 08:00


     





 


 Ketorolac


 Tromethamine


  (Toradol 30mg


 Vial)  30 mg  PRN Q6HRS  PRN  10/10/20 16:15


 10/15/20 16:14 DC  





 


 Linagliptin


  (Tradjenta)  5 mg  DAILY  10/10/20 17:00


    10/23/20 08:42


5 MG


 


 Lisinopril


  (Prinivil)  5 mg  DAILY  10/10/20 17:00


    10/23/20 08:41


5 MG


 


 Methylprednisolone


 Sodium Succinate


  (SOLU-Medrol


 40MG VIAL)  40 mg  DAILY  10/25/20 10:45


    10/25/20 12:35


40 MG


 


 Midazolam HCl  100 ml @ 0


 mls/hr  CONT  PRN  10/12/20 15:00


 10/24/20 10:13 DC 10/20/20 12:38


10 MLS/HR


 


 Non-Formulary


 Medication 1 ea/


 Sodium Chloride  230 ml @ 


 460 mls/hr  Q24H  10/12/20 12:00


 10/15/20 12:29 DC 10/15/20 12:42


460 MLS/HR


 


 Ondansetron HCl


  (Zofran)  4 mg  PRN Q4HRS  PRN  10/10/20 16:15


    10/11/20 11:23


4 MG


 


 Pantoprazole


 Sodium


  (PROTONIX VIAL


 for IV PUSH)  40 mg  DAILY  10/14/20 09:00


    10/25/20 10:00


40 MG


 


 Piperacillin Sod/


 Tazobactam Sod


 3.375 gm/Sodium


 Chloride  50 ml @ 


 100 mls/hr  Q6HRS  10/16/20 12:00


 10/24/20 10:13 DC 10/24/20 05:58


100 MLS/HR


 


 Polyethylene


 Glycol


  (miraLAX PACKET)  17 gm  DAILY  10/24/20 09:00


     





 


 Propofol


  (Diprivan)  100 mg  1X  ONCE  10/12/20 17:00


 10/12/20 17:01 DC 10/12/20 17:00


100 MG


 


 Sodium Chloride  500 ml @ 


 500 mls/hr  1X PRN  PRN  10/23/20 10:45


     





 


 Succinylcholine


 Chloride


  (Anectine)  100 mg  1X  ONCE  10/12/20 17:00


 10/12/20 17:01 DC  





 


 Vancomycin HCl


  (Vanco Per


 Pharmacy)  1 each  PRN DAILY  PRN  10/16/20 06:30


 10/19/20 12:17 DC 10/18/20 23:33


1 EACH


 


 Vancomycin HCl


  (Vancomycin


 Random Level)  1 each  1X  ONCE  10/20/20 13:00


 10/19/20 09:59 DC  





 


 Vancomycin HCl


  (Vancomycin


 Trough Level)  1 each  1X  ONCE  10/18/20 21:00


 10/19/20 09:59 DC 10/18/20 21:00


1 EACH


 


 Vancomycin HCl


 1.5 gm/Sodium


 Chloride  500 ml @ 


 250 mls/hr  Q8H  10/17/20 21:30


 10/19/20 06:45 DC 10/19/20 06:29


250 MLS/HR


 


 Vancomycin HCl 2


 gm/Sodium Chloride  500 ml @ 


 250 mls/hr  1X  ONCE  10/16/20 07:00


 10/16/20 08:59 DC 10/16/20 06:55


250 MLS/HR


 


 Vecuronium Bromide


  (Norcuron Bolus)  6 mg  PRN Q4HRS  PRN  10/12/20 17:45


 10/24/20 10:13 DC 10/16/20 07:10


6 MG


 


 Verapamil HCl


  (Calan Sr)  120 mg  BID  10/10/20 21:00


 10/19/20 07:11 DC 10/18/20 20:51


120 MG











Labs:


Lab





Laboratory Tests








Test


 10/25/20


07:56 10/25/20


11:38 10/25/20


20:58


 


Glucose (Fingerstick)


 214 mg/dL


(70-99) 247 mg/dL


(70-99) 251 mg/dL


(70-99)











Objective:


Assessment:


1.  Acute hypoxic respiratory failure.  Improved


2.  COVID-19 Pneumonia improved


3.  Migraine headaches.


4.  Diabetes.


5.  Obesity.


6.  Fibromyalgia.


7.  Abnormal LFTs.


8.  Leukocytosis on steroid





Plan:


Plan of Care


Steroid taper


supportive care


PT OT











SHYAM BRAND MD           Oct 26, 2020 07:38

## 2020-10-26 NOTE — NUR
ALL following. Spoke with RN and reviewed chart. Pt has been accepted clinically at [facility] 
per chart review. Spoke with Meghann and faxed updates to see if pt still meets criteria per 
extubation. ALL following.

-------------------------------------------------------------------------------

Addendum: 10/26/20 at 1707 by JEAN CARRIZALES

-------------------------------------------------------------------------------

Meghann to review clinicals and submit for authorization from insurance if pt meets criteria.

## 2020-10-26 NOTE — RAD
Examination: PORTABLE CHEST 1V

 

History: Reason: COVID PNEUMONIA 

 

Comparison/Correlation: 10/23/2020

 

Findings: Portable upright frontal view of the chest was obtained. Heart 

size is within normal limits. Minimal left apical pleural thickening is 

present. Patchy interstitial infiltrates involving the lung fields are 

present and mild . Improvement in aeration compared to previous exam is 

present. No significant pleural effusion. No pneumothorax.

 

Right subclavian catheter terminates overlying the left brachiocephalic 

vein. This is similar to the prior exam.

 

 

Impression:

Improved pulmonary aeration.

 

Electronically signed by: Emir Linares MD (10/26/2020 8:46 AM) [id number]

## 2020-10-27 VITALS — DIASTOLIC BLOOD PRESSURE: 46 MMHG | SYSTOLIC BLOOD PRESSURE: 104 MMHG

## 2020-10-27 VITALS — DIASTOLIC BLOOD PRESSURE: 59 MMHG | SYSTOLIC BLOOD PRESSURE: 117 MMHG

## 2020-10-27 VITALS — DIASTOLIC BLOOD PRESSURE: 54 MMHG | SYSTOLIC BLOOD PRESSURE: 97 MMHG

## 2020-10-27 VITALS — DIASTOLIC BLOOD PRESSURE: 69 MMHG | SYSTOLIC BLOOD PRESSURE: 110 MMHG

## 2020-10-27 VITALS — DIASTOLIC BLOOD PRESSURE: 69 MMHG | SYSTOLIC BLOOD PRESSURE: 114 MMHG

## 2020-10-27 VITALS — DIASTOLIC BLOOD PRESSURE: 63 MMHG | SYSTOLIC BLOOD PRESSURE: 114 MMHG

## 2020-10-27 VITALS — SYSTOLIC BLOOD PRESSURE: 114 MMHG | DIASTOLIC BLOOD PRESSURE: 69 MMHG

## 2020-10-27 LAB
ALBUMIN SERPL-MCNC: 2.4 G/DL (ref 3.4–5)
ALBUMIN/GLOB SERPL: 0.8 {RATIO} (ref 1–1.7)
ALP SERPL-CCNC: 117 U/L (ref 46–116)
ALT SERPL-CCNC: 243 U/L (ref 14–59)
ANION GAP SERPL CALC-SCNC: 9 MMOL/L (ref 6–14)
AST SERPL-CCNC: 45 U/L (ref 15–37)
BILIRUB SERPL-MCNC: 0.4 MG/DL (ref 0.2–1)
BUN SERPL-MCNC: 18 MG/DL (ref 7–20)
BUN/CREAT SERPL: 30 (ref 6–20)
CALCIUM SERPL-MCNC: 8.4 MG/DL (ref 8.5–10.1)
CHLORIDE SERPL-SCNC: 102 MMOL/L (ref 98–107)
CO2 SERPL-SCNC: 26 MMOL/L (ref 21–32)
CREAT SERPL-MCNC: 0.6 MG/DL (ref 0.6–1)
CRP SERPL-MCNC: 37.7 MG/L (ref 0–3.3)
ERYTHROCYTE [DISTWIDTH] IN BLOOD BY AUTOMATED COUNT: 15.6 % (ref 11.5–14.5)
GFR SERPLBLD BASED ON 1.73 SQ M-ARVRAT: 108.6 ML/MIN
GLUCOSE SERPL-MCNC: 138 MG/DL (ref 70–99)
HCT VFR BLD CALC: 35.5 % (ref 36–47)
HGB BLD-MCNC: 11.7 G/DL (ref 12–15.5)
MCH RBC QN AUTO: 30 PG (ref 25–35)
MCHC RBC AUTO-ENTMCNC: 33 G/DL (ref 31–37)
MCV RBC AUTO: 91 FL (ref 79–100)
PLATELET # BLD AUTO: 278 X10^3/UL (ref 140–400)
POTASSIUM SERPL-SCNC: 4 MMOL/L (ref 3.5–5.1)
PROT SERPL-MCNC: 5.3 G/DL (ref 6.4–8.2)
RBC # BLD AUTO: 3.89 X10^6/UL (ref 3.5–5.4)
SODIUM SERPL-SCNC: 137 MMOL/L (ref 136–145)
WBC # BLD AUTO: 13.2 X10^3/UL (ref 4–11)

## 2020-10-27 RX ADMIN — LINAGLIPTIN SCH MG: 5 TABLET, FILM COATED ORAL at 10:10

## 2020-10-27 RX ADMIN — DOCUSATE SODIUM SCH MG: 100 CAPSULE, LIQUID FILLED ORAL at 22:20

## 2020-10-27 RX ADMIN — HALOPERIDOL LACTATE SCH MG: 5 INJECTION, SOLUTION INTRAMUSCULAR at 14:00

## 2020-10-27 RX ADMIN — INSULIN LISPRO SCH UNITS: 100 INJECTION, SOLUTION INTRAVENOUS; SUBCUTANEOUS at 12:04

## 2020-10-27 RX ADMIN — LISINOPRIL SCH MG: 5 TABLET ORAL at 10:11

## 2020-10-27 RX ADMIN — INSULIN LISPRO SCH UNITS: 100 INJECTION, SOLUTION INTRAVENOUS; SUBCUTANEOUS at 18:21

## 2020-10-27 RX ADMIN — PANTOPRAZOLE SODIUM SCH MG: 40 INJECTION, POWDER, FOR SOLUTION INTRAVENOUS at 10:10

## 2020-10-27 RX ADMIN — GLYCERIN, ISOLEUCINE, LEUCINE, LYSINE, METHIONINE, PHENYLALANINE, THREONINE, TRYPTOPHAN, VALINE, ALANINE, GLYCINE, ARGININE, HISTIDINE, PROLINE, SERINE, CYSTEINE, SODIUM ACETATE, MAGNESIUM ACETATE, CALCIUM ACETATE, SODIUM CHLORIDE, POTASSIUM CHLORIDE, PHOSPHORIC ACID, AND POTASSIUM METABISULFITE SCH MLS/HR
3; .21; .27; .22; .16; .17; .12; .046; .2; .21; .42; .29; .085; .34; .18; .014; .2; .054; .026; .12; .15; .041 INJECTION INTRAVENOUS at 12:06

## 2020-10-27 RX ADMIN — DOCUSATE SODIUM SCH MG: 100 CAPSULE, LIQUID FILLED ORAL at 09:00

## 2020-10-27 RX ADMIN — INSULIN GLARGINE SCH UNIT: 100 INJECTION, SOLUTION SUBCUTANEOUS at 09:00

## 2020-10-27 RX ADMIN — HYDROCODONE BITARTRATE AND ACETAMINOPHEN PRN TAB: 5; 325 TABLET ORAL at 15:08

## 2020-10-27 RX ADMIN — INSULIN LISPRO SCH UNITS: 100 INJECTION, SOLUTION INTRAVENOUS; SUBCUTANEOUS at 22:30

## 2020-10-27 RX ADMIN — INSULIN LISPRO SCH UNITS: 100 INJECTION, SOLUTION INTRAVENOUS; SUBCUTANEOUS at 10:08

## 2020-10-27 RX ADMIN — INSULIN GLARGINE SCH UNIT: 100 INJECTION, SOLUTION SUBCUTANEOUS at 22:29

## 2020-10-27 RX ADMIN — HALOPERIDOL LACTATE SCH MG: 5 INJECTION, SOLUTION INTRAMUSCULAR at 22:20

## 2020-10-27 RX ADMIN — HALOPERIDOL LACTATE SCH MG: 5 INJECTION, SOLUTION INTRAMUSCULAR at 06:38

## 2020-10-27 RX ADMIN — ENOXAPARIN SODIUM SCH MG: 40 INJECTION SUBCUTANEOUS at 10:11

## 2020-10-27 RX ADMIN — ENOXAPARIN SODIUM SCH MG: 40 INJECTION SUBCUTANEOUS at 22:21

## 2020-10-27 RX ADMIN — POLYETHYLENE GLYCOL 3350 SCH GM: 17 POWDER, FOR SOLUTION ORAL at 09:00

## 2020-10-27 NOTE — NUR
SW following. Spoke with RN and reviewed chart. Discharge plan is [facility] pending 
insurance approval. ALL confirmed with Meghann from [facility] that she did submit for 
authorization. SW following for discharge planning.

## 2020-10-27 NOTE — PDOC
Infectious Disease Note


Subjective:


Subjective


 


Pt feeling better





Vital Signs:


Vital Signs





Vital Signs








  Date Time  Temp Pulse Resp B/P (MAP) Pulse Ox O2 Delivery O2 Flow Rate FiO2


 


10/27/20 03:00 98.9 94 18 104/46 (65) 95 Nasal Cannula 3.0 





 98.9       











Physical Exam:


PHYSICAL EXAM


GENERAL: Awake comfortable


HEENT:  Normocephalic, atraumatic.  Anicteric.


NECK:  Supple.  No thyromegaly.


LUNGS:  Coarse breath sounds.  No wheezing.


HEART:  S1, S2, no murmurs.


ABDOMEN:  Obese, soft, nontender, bowel sounds present.


EXTREMITIES:  No edema, no cyanosis.


DERMATOLOGIC:  Warm, dry.  No generalized rash.  Multiple tattoos.


CENTRAL NERVOUS SYSTEM: Alert and oriented no focal deficit





Medications:


Inpatient Meds:





Current Medications








 Medications


  (Trade)  Dose


 Ordered  Sig/Angelic  Start Time


 Stop Time Status Last Admin


Dose Admin


 


 Acetaminophen


  (Tylenol)  650 mg  PRN Q6HRS  PRN  10/15/20 20:30


    10/17/20 05:07


650 MG


 


 Acetaminophen/


 Aspirin/Caffeine


  (Excedrin


 Migraine)  2 tab  PRN Q6HRS  PRN  10/10/20 16:15


     





 


 Acetaminophen/


 Hydrocodone Bitart


  (Lortab 5/325)  1 tab  PRN Q6HRS  PRN  10/10/20 16:15


    10/12/20 13:54


1 TAB


 


 Amino Acids/


 Glycerin/


 Electrolytes  1,000 ml @ 


 80 mls/hr  [id number]  10/24/20 09:00


    10/26/20 22:50


80 MLS/HR


 


 Amitriptyline HCl


  (Elavil)  75 mg  PRN QHS  PRN  10/10/20 16:15


     





 


 Atropine Sulfate


  (ATROPINE 0.5mg


 SYRINGE)  0.5 mg  PRN Q5MIN  PRN  10/23/20 10:45


     





 


 Bisacodyl


  (Dulcolax Supp)  10 mg  PRN DAILY  PRN  10/24/20 09:00


     





 


 Calcium Carbonate/


 Glycine


  (Tums)  1,000 mg  PRN Q4HRS  PRN  10/12/20 10:45


    10/12/20 10:46


1,000 MG


 


 Ceftriaxone Sodium


  (Rocephin)  1 gm  Q24H  10/11/20 10:00


 10/16/20 14:05 DC 10/16/20 10:19


1 GM


 


 Dexmedetomidine


 HCl 400 mcg/


 Sodium Chloride  100 ml @ 0


 mls/hr  CONT  PRN  10/23/20 10:45


 10/24/20 10:13 DC  





 


 Dextrose


  (Dextrose


 50%-Water Syringe)  12.5 gm  PRN Q15MIN  PRN  10/11/20 10:15


   UNV  





 


 Docusate Sodium


  (Colace)  100 mg  BID  10/24/20 09:00


     





 


 Duloxetine HCl


  (Cymbalta)  60 mg  BID  10/10/20 21:00


    10/26/20 22:48


60 MG


 


 Enoxaparin Sodium


  (Lovenox 100mg


 Syringe)  100 mg  Q12HR  10/11/20 11:00


 10/11/20 12:53 DC 10/11/20 10:57


100 MG


 


 Enoxaparin Sodium


  (Lovenox 40mg


 Syringe)  40 mg  Q12HR  10/11/20 21:00


    10/26/20 22:48


40 MG


 


 Famotidine


  (Pepcid Vial)  20 mg  BID  10/13/20 09:00


 10/13/20 07:56 DC  





 


 Fentanyl Citrate  55 ml @ 0


 mls/hr  CONT  PRN  10/19/20 10:15


 10/24/20 10:13 DC 10/22/20 13:14


2 MLS/HR


 


 Haloperidol


 Lactate


  (Haldol Inj)  5 mg  Q8HRS  10/21/20 09:45


    10/27/20 06:38


5 MG


 


 Insulin Glargine


  (Lantus Syringe)  30 unit  BID  10/24/20 09:00


    10/26/20 22:50


30 UNIT


 


 Insulin Human


 Lispro


  (HumaLOG)  10 units  TIDACHC  10/26/20 08:00


    10/26/20 22:55


10 UNITS


 


 Ketorolac


 Tromethamine


  (Toradol 30mg


 Vial)  30 mg  PRN Q6HRS  PRN  10/10/20 16:15


 10/15/20 16:14 DC  





 


 Linagliptin


  (Tradjenta)  5 mg  DAILY  10/10/20 17:00


    10/26/20 10:20


5 MG


 


 Lisinopril


  (Prinivil)  5 mg  DAILY  10/10/20 17:00


    10/26/20 10:20


5 MG


 


 Methylprednisolone


 Sodium Succinate


  (SOLU-Medrol


 40MG VIAL)  40 mg  DAILY  10/25/20 10:45


 10/26/20 15:51 DC 10/26/20 10:19


40 MG


 


 Midazolam HCl  100 ml @ 0


 mls/hr  CONT  PRN  10/12/20 15:00


 10/24/20 10:13 DC 10/20/20 12:38


10 MLS/HR


 


 Non-Formulary


 Medication 1 ea/


 Sodium Chloride  230 ml @ 


 460 mls/hr  Q24H  10/12/20 12:00


 10/15/20 12:29 DC 10/15/20 12:42


460 MLS/HR


 


 Ondansetron HCl


  (Zofran)  4 mg  PRN Q4HRS  PRN  10/10/20 16:15


    10/11/20 11:23


4 MG


 


 Pantoprazole


 Sodium


  (PROTONIX VIAL


 for IV PUSH)  40 mg  DAILY  10/14/20 09:00


    10/26/20 10:19


40 MG


 


 Piperacillin Sod/


 Tazobactam Sod


 3.375 gm/Sodium


 Chloride  50 ml @ 


 100 mls/hr  Q6HRS  10/16/20 12:00


 10/24/20 10:13 DC 10/24/20 05:58


100 MLS/HR


 


 Polyethylene


 Glycol


  (miraLAX PACKET)  17 gm  DAILY  10/24/20 09:00


     





 


 Propofol


  (Diprivan)  100 mg  1X  ONCE  10/12/20 17:00


 10/12/20 17:01 DC 10/12/20 17:00


100 MG


 


 Sodium Chloride  500 ml @ 


 500 mls/hr  1X PRN  PRN  10/23/20 10:45


     





 


 Succinylcholine


 Chloride


  (Anectine)  100 mg  1X  ONCE  10/12/20 17:00


 10/12/20 17:01 DC  





 


 Vancomycin HCl


  (Vanco Per


 Pharmacy)  1 each  PRN DAILY  PRN  10/16/20 06:30


 10/19/20 12:17 DC 10/18/20 23:33


1 EACH


 


 Vancomycin HCl


  (Vancomycin


 Random Level)  1 each  1X  ONCE  10/20/20 13:00


 10/19/20 09:59 DC  





 


 Vancomycin HCl


  (Vancomycin


 Trough Level)  1 each  1X  ONCE  10/18/20 21:00


 10/19/20 09:59 DC 10/18/20 21:00


1 EACH


 


 Vancomycin HCl


 1.5 gm/Sodium


 Chloride  500 ml @ 


 250 mls/hr  Q8H  10/17/20 21:30


 10/19/20 06:45 DC 10/19/20 06:29


250 MLS/HR


 


 Vancomycin HCl 2


 gm/Sodium Chloride  500 ml @ 


 250 mls/hr  1X  ONCE  10/16/20 07:00


 10/16/20 08:59 DC 10/16/20 06:55


250 MLS/HR


 


 Vecuronium Bromide


  (Norcuron Bolus)  6 mg  PRN Q4HRS  PRN  10/12/20 17:45


 10/24/20 10:13 DC 10/16/20 07:10


6 MG


 


 Verapamil HCl


  (Calan Sr)  120 mg  BID  10/10/20 21:00


 10/19/20 07:11 DC 10/18/20 20:51


120 MG











Labs:


Lab





Laboratory Tests








Test


 10/26/20


08:23 10/26/20


11:47 10/26/20


13:39 10/26/20


16:12


 


Glucose (Fingerstick)


 198 mg/dL


(70-99) 248 mg/dL


(70-99) 


 245 mg/dL


(70-99)


 


White Blood Count


 


 


 17.1 x10^3/uL


(4.0-11.0) 





 


Red Blood Count


 


 


 4.12 x10^6/uL


(3.50-5.40) 





 


Hemoglobin


 


 


 12.4 g/dL


(12.0-15.5) 





 


Hematocrit


 


 


 37.8 %


(36.0-47.0) 





 


Mean Corpuscular Volume   92 fL ()  


 


Mean Corpuscular Hemoglobin   30 pg (25-35)  


 


Mean Corpuscular Hemoglobin


Concent 


 


 33 g/dL


(31-37) 





 


Red Cell Distribution Width


 


 


 15.6 %


(11.5-14.5) 





 


Platelet Count


 


 


 324 x10^3/uL


(140-400) 





 


Sodium Level


 


 


 135 mmol/L


(136-145) 





 


Potassium Level


 


 


 4.2 mmol/L


(3.5-5.1) 





 


Chloride Level


 


 


 99 mmol/L


() 





 


Carbon Dioxide Level


 


 


 27 mmol/L


(21-32) 





 


Anion Gap   9 (6-14)  


 


Blood Urea Nitrogen


 


 


 19 mg/dL


(7-20) 





 


Creatinine


 


 


 0.6 mg/dL


(0.6-1.0) 





 


Estimated GFR


(Cockcroft-Gault) 


 


 108.6 


 





 


BUN/Creatinine Ratio   32 (6-20)  


 


Glucose Level


 


 


 245 mg/dL


(70-99) 





 


Calcium Level


 


 


 8.9 mg/dL


(8.5-10.1) 





 


Total Bilirubin


 


 


 0.5 mg/dL


(0.2-1.0) 





 


Aspartate Amino Transf


(AST/SGOT) 


 


 53 U/L (15-37) 


 





 


Alanine Aminotransferase


(ALT/SGPT) 


 


 295 U/L


(14-59) 





 


Alkaline Phosphatase


 


 


 132 U/L


() 





 


Total Protein


 


 


 6.1 g/dL


(6.4-8.2) 





 


Albumin


 


 


 2.5 g/dL


(3.4-5.0) 





 


Albumin/Globulin Ratio   0.7 (1.0-1.7)  


 


Test


 10/26/20


20:33 


 


 





 


Glucose (Fingerstick)


 209 mg/dL


(70-99) 


 


 














Objective:


Assessment:


1.  Acute hypoxic respiratory failure.  Improved


2.  COVID-19 Pneumonia improved


3.  Migraine headaches.


4.  Diabetes.


5.  Obesity.


6.  Fibromyalgia.


7.  Abnormal LFTs.


8.  Leukocytosis on steroid





Plan:


Plan of Care


completed remdesivir, steroids 


cont supportive care


PT OT











SHYAM BRAND MD           Oct 27, 2020 06:53

## 2020-10-27 NOTE — PDOC
PULMONARY PROGRESS NOTES


DATE: 10/27/20 


TIME: 08:33


Subjective


Patient was working with PT, standing up, short of air with exertion


Vitals





Vital Signs








  Date Time  Temp Pulse Resp B/P (MAP) Pulse Ox O2 Delivery O2 Flow Rate FiO2


 


10/27/20 03:00 98.9 94 18 104/46 (65) 95 Nasal Cannula 3.0 





 98.9       








Comments


Strong cough


General:  Alert


Lungs:  Clear


Cardiovascular:  S1, S2


Abdomen:  Soft, Other (Obese)


Neuro Exam:  Alert


Extremities:  No Edema, Other (Mild edema)


Skin:  Warm


Labs





Laboratory Tests








Test


 10/25/20


11:38 10/25/20


20:58 10/26/20


08:23 10/26/20


11:47


 


Glucose (Fingerstick)


 247 mg/dL


(70-99) 251 mg/dL


(70-99) 198 mg/dL


(70-99) 248 mg/dL


(70-99)


 


Test


 10/26/20


13:39 10/26/20


16:12 10/26/20


20:33 10/27/20


08:27


 


White Blood Count


 17.1 x10^3/uL


(4.0-11.0) 


 


 





 


Red Blood Count


 4.12 x10^6/uL


(3.50-5.40) 


 


 





 


Hemoglobin


 12.4 g/dL


(12.0-15.5) 


 


 





 


Hematocrit


 37.8 %


(36.0-47.0) 


 


 





 


Mean Corpuscular Volume 92 fL ()    


 


Mean Corpuscular Hemoglobin 30 pg (25-35)    


 


Mean Corpuscular Hemoglobin


Concent 33 g/dL


(31-37) 


 


 





 


Red Cell Distribution Width


 15.6 %


(11.5-14.5) 


 


 





 


Platelet Count


 324 x10^3/uL


(140-400) 


 


 





 


Sodium Level


 135 mmol/L


(136-145) 


 


 





 


Potassium Level


 4.2 mmol/L


(3.5-5.1) 


 


 





 


Chloride Level


 99 mmol/L


() 


 


 





 


Carbon Dioxide Level


 27 mmol/L


(21-32) 


 


 





 


Anion Gap 9 (6-14)    


 


Blood Urea Nitrogen


 19 mg/dL


(7-20) 


 


 





 


Creatinine


 0.6 mg/dL


(0.6-1.0) 


 


 





 


Estimated GFR


(Cockcroft-Gault) 108.6 


 


 


 





 


BUN/Creatinine Ratio 32 (6-20)    


 


Glucose Level


 245 mg/dL


(70-99) 


 


 





 


Calcium Level


 8.9 mg/dL


(8.5-10.1) 


 


 





 


Total Bilirubin


 0.5 mg/dL


(0.2-1.0) 


 


 





 


Aspartate Amino Transf


(AST/SGOT) 53 U/L (15-37) 


 


 


 





 


Alanine Aminotransferase


(ALT/SGPT) 295 U/L


(14-59) 


 


 





 


Alkaline Phosphatase


 132 U/L


() 


 


 





 


Total Protein


 6.1 g/dL


(6.4-8.2) 


 


 





 


Albumin


 2.5 g/dL


(3.4-5.0) 


 


 





 


Albumin/Globulin Ratio 0.7 (1.0-1.7)    


 


Glucose (Fingerstick)


 


 245 mg/dL


(70-99) 209 mg/dL


(70-99) 151 mg/dL


(70-99)








Laboratory Tests








Test


 10/26/20


11:47 10/26/20


13:39 10/26/20


16:12 10/26/20


20:33


 


Glucose (Fingerstick)


 248 mg/dL


(70-99) 


 245 mg/dL


(70-99) 209 mg/dL


(70-99)


 


White Blood Count


 


 17.1 x10^3/uL


(4.0-11.0) 


 





 


Red Blood Count


 


 4.12 x10^6/uL


(3.50-5.40) 


 





 


Hemoglobin


 


 12.4 g/dL


(12.0-15.5) 


 





 


Hematocrit


 


 37.8 %


(36.0-47.0) 


 





 


Mean Corpuscular Volume  92 fL ()   


 


Mean Corpuscular Hemoglobin  30 pg (25-35)   


 


Mean Corpuscular Hemoglobin


Concent 


 33 g/dL


(31-37) 


 





 


Red Cell Distribution Width


 


 15.6 %


(11.5-14.5) 


 





 


Platelet Count


 


 324 x10^3/uL


(140-400) 


 





 


Sodium Level


 


 135 mmol/L


(136-145) 


 





 


Potassium Level


 


 4.2 mmol/L


(3.5-5.1) 


 





 


Chloride Level


 


 99 mmol/L


() 


 





 


Carbon Dioxide Level


 


 27 mmol/L


(21-32) 


 





 


Anion Gap  9 (6-14)   


 


Blood Urea Nitrogen


 


 19 mg/dL


(7-20) 


 





 


Creatinine


 


 0.6 mg/dL


(0.6-1.0) 


 





 


Estimated GFR


(Cockcroft-Gault) 


 108.6 


 


 





 


BUN/Creatinine Ratio  32 (6-20)   


 


Glucose Level


 


 245 mg/dL


(70-99) 


 





 


Calcium Level


 


 8.9 mg/dL


(8.5-10.1) 


 





 


Total Bilirubin


 


 0.5 mg/dL


(0.2-1.0) 


 





 


Aspartate Amino Transf


(AST/SGOT) 


 53 U/L (15-37) 


 


 





 


Alanine Aminotransferase


(ALT/SGPT) 


 295 U/L


(14-59) 


 





 


Alkaline Phosphatase


 


 132 U/L


() 


 





 


Total Protein


 


 6.1 g/dL


(6.4-8.2) 


 





 


Albumin


 


 2.5 g/dL


(3.4-5.0) 


 





 


Albumin/Globulin Ratio  0.7 (1.0-1.7)   


 


Test


 10/27/20


08:27 


 


 





 


Glucose (Fingerstick)


 151 mg/dL


(70-99) 


 


 











Medications





Active Scripts








 Medications  Dose


 Route/Sig


 Max Daily Dose Days Date Category


 


 Cymbalta


  (Duloxetine Hcl)


 20 Mg Capsule.dr  20 Mg


 PO DAILY


   9/22/20 Reported


 


 Lisinopril 5 Mg


 Tablet  5 Mg


 PO DAILY


   9/22/20 Reported


 


 Amitriptyline Hcl


 10 Mg Tablet  10 Mg


 PO DAILY


   9/22/20 Reported


 


 Trulicity


  (Dulaglutide) 1.5


 Mg/0.5 Ml


 Pen.injctr  1.5 Mg


 SQ


   9/22/20 Reported


 


 Nesina


  (Alogliptin


 Benzoate) 12.5 Mg


 Tablet  12.5 Mg


 PO


   9/22/20 Reported


 


 Propranolol Hcl


 80 Mg Tablet  80 Mg


 PO DAILY


   5/14/14 Reported


 


 Cymbalta


  (Duloxetine Hcl)


 60 Mg Capsule.dr  60 Mg


 PO DAILY


   5/14/14 Reported








Comments


CXR 10/23 -- no change





Impression


.


IMPRESSION:


1.  Acute hypoxemic respiratory failure.-- S/P intubation on 10/12/20 

--improving, extubated 10/23/20


2.  COVID-19 viral pneumonia.


3.  Abnormal chest x-ray, possible gram-positive, gram-negative pneumonia.


4.  Headaches.


5.  Type 2 diabetes.


6.  Fibromyalgia.


7.  Morbid obesity.


8.  Nonalcoholic steatohepatitis.


9.  Obstructive sleep apnea.


10. Abnormal D-Dimer due to COVID, improving 10/15


11. Elevated LF likely 2/2 covid 


12.  Critical care polymyopathy





Plan


.


Aggressive PT OT


Than likely will need LTAC, will ask social service to look into it


Continue supplemental oxygen as needed to keep oxygen saturations greater than 

92%


Follow speech recommendation


Follow chest x-ray as needed


Status post plasma and remdesivir


DC steroids


Follow infectious disease recommendations for antibiotics, currently not on 

antibiotic therapy


DVT/GI prophylaxis, Protonix and Lovenox





Discussed with LEWIS BULLOCK MD              Oct 27, 2020 08:33

## 2020-10-27 NOTE — PN
DATE:  10/27/2020



SUBJECTIVE:  The patient is resting almost flat in bed, in no apparent distress.

 She is awake, alert, seems to be much better than yesterday.  She has eaten her

breakfast; however, she required assistance.  She has not been able to feed

herself yet.  She worked with Physical Therapy and was sat in the chair for few

hours according to her.



PHYSICAL EXAMINATION:

GENERAL:  When I examined her, she looked well and was clearly in no apparent

respiratory distress, pale, but no jaundice, cyanosis or thyromegaly.  No

jugular venous distention.  No lower limb edema.

VITAL SIGNS:  Her heart rate was 99, blood pressure was 117/59, temperature was

99, respiratory rate 21 and oxygen saturation was 96% on 4 liters of oxygen.

HEAD, EYES, EARS, NOSE AND THROAT:  Showed normocephalic, atraumatic.

NECK:  Supple.

HEART:  Showed normal first and second heart sounds.  No gallop or murmur.

CHEST:  Clear to auscultation.  No crepitation or rhonchi.

ABDOMEN:  Soft, nontender.

NEUROLOGIC:  She is definitely more awake, alert, responding appropriately.  All

her cranial nerves are intact.  She moves extremities without difficulty.



Her intake was incompletely recorded, output was 2100.



LABORATORY DATA:  As of yesterday; her serum sodium was 135, potassium 4.2,

chloride 99, bicarbonate 27, anion gap of 9, BUN 19, creatinine 0.6, estimated

GFR was 180 mL per minute.  Her glucose was 245, calcium was 8.9.  Total

bilirubin 0.5.  AST, ALT, alkaline phosphatase slightly elevated, but trending

down.  Her total protein was 6.1, albumin was 2.5.  White cell count was 17,000,

hemoglobin 12, hematocrit 37, MCV 92, and platelet count of 324,000.



ASSESSMENT:

1.  COVID-19 pneumonia.

2.  Acute hypoxic respiratory failure requiring intubation and mechanical

ventilation; however, she was successfully extubated.  She is now on 4 liters of

oxygen, maintaining her oxygen saturation at 96%.

3.  Today, she did spike her temperature up to 101.7.  Her blood cultures are so

far negative.  She was on Zosyn and Zyvox and in fact both antibiotics were

discontinued.

4.  The patient has multiple other medical problems including:

A.  Migraine headache.

B.  Type 2 diabetes mellitus.

C.  Fibromyalgia.

D.  Hypertension.

E.  Nephrolithiasis.

F.  Noncardiac steatohepatitis.

G.  Chronic obstructive sleep apnea.

5.  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



PLAN:  To obviously continue with nutritional support.  Continue to monitor her

blood sugar and adjust insulin as needed.  Continue with GI and DVT prophylaxis.

 Continue with physical and occupational therapy.  We did repeat her swabs and

we will decide on further management accordingly.

 



______________________________

JULIO CISSE MD



DR:  GOKUL/glo  JOB#:  [id number] / [id number]

DD:  10/27/2020 11:36  DT:  10/27/2020 13:28

## 2020-10-28 VITALS — DIASTOLIC BLOOD PRESSURE: 78 MMHG | SYSTOLIC BLOOD PRESSURE: 127 MMHG

## 2020-10-28 VITALS — DIASTOLIC BLOOD PRESSURE: 61 MMHG | SYSTOLIC BLOOD PRESSURE: 115 MMHG

## 2020-10-28 VITALS — SYSTOLIC BLOOD PRESSURE: 139 MMHG | DIASTOLIC BLOOD PRESSURE: 67 MMHG

## 2020-10-28 VITALS — DIASTOLIC BLOOD PRESSURE: 68 MMHG | SYSTOLIC BLOOD PRESSURE: 104 MMHG

## 2020-10-28 RX ADMIN — LISINOPRIL SCH MG: 5 TABLET ORAL at 09:02

## 2020-10-28 RX ADMIN — POLYETHYLENE GLYCOL 3350 SCH GM: 17 POWDER, FOR SOLUTION ORAL at 09:01

## 2020-10-28 RX ADMIN — INSULIN GLARGINE SCH UNIT: 100 INJECTION, SOLUTION SUBCUTANEOUS at 09:00

## 2020-10-28 RX ADMIN — GLYCERIN, ISOLEUCINE, LEUCINE, LYSINE, METHIONINE, PHENYLALANINE, THREONINE, TRYPTOPHAN, VALINE, ALANINE, GLYCINE, ARGININE, HISTIDINE, PROLINE, SERINE, CYSTEINE, SODIUM ACETATE, MAGNESIUM ACETATE, CALCIUM ACETATE, SODIUM CHLORIDE, POTASSIUM CHLORIDE, PHOSPHORIC ACID, AND POTASSIUM METABISULFITE SCH MLS/HR
3; .21; .27; .22; .16; .17; .12; .046; .2; .21; .42; .29; .085; .34; .18; .014; .2; .054; .026; .12; .15; .041 INJECTION INTRAVENOUS at 01:14

## 2020-10-28 RX ADMIN — DOCUSATE SODIUM SCH MG: 100 CAPSULE, LIQUID FILLED ORAL at 09:02

## 2020-10-28 RX ADMIN — LINAGLIPTIN SCH MG: 5 TABLET, FILM COATED ORAL at 09:02

## 2020-10-28 RX ADMIN — HALOPERIDOL LACTATE SCH MG: 5 INJECTION, SOLUTION INTRAMUSCULAR at 06:00

## 2020-10-28 RX ADMIN — HALOPERIDOL LACTATE SCH MG: 5 INJECTION, SOLUTION INTRAMUSCULAR at 13:20

## 2020-10-28 RX ADMIN — INSULIN LISPRO SCH UNITS: 100 INJECTION, SOLUTION INTRAVENOUS; SUBCUTANEOUS at 12:55

## 2020-10-28 RX ADMIN — GLYCERIN, ISOLEUCINE, LEUCINE, LYSINE, METHIONINE, PHENYLALANINE, THREONINE, TRYPTOPHAN, VALINE, ALANINE, GLYCINE, ARGININE, HISTIDINE, PROLINE, SERINE, CYSTEINE, SODIUM ACETATE, MAGNESIUM ACETATE, CALCIUM ACETATE, SODIUM CHLORIDE, POTASSIUM CHLORIDE, PHOSPHORIC ACID, AND POTASSIUM METABISULFITE SCH MLS/HR
3; .21; .27; .22; .16; .17; .12; .046; .2; .21; .42; .29; .085; .34; .18; .014; .2; .054; .026; .12; .15; .041 INJECTION INTRAVENOUS at 13:20

## 2020-10-28 RX ADMIN — INSULIN LISPRO SCH UNITS: 100 INJECTION, SOLUTION INTRAVENOUS; SUBCUTANEOUS at 09:13

## 2020-10-28 RX ADMIN — ENOXAPARIN SODIUM SCH MG: 40 INJECTION SUBCUTANEOUS at 09:09

## 2020-10-28 NOTE — PDOC
PULMONARY PROGRESS NOTES


DATE: 10/28/20 


TIME: 11:48


Subjective


Patient was working with PT, standing up, short of air with exertion


Vitals





Vital Signs








  Date Time  Temp Pulse Resp B/P (MAP) Pulse Ox O2 Delivery O2 Flow Rate FiO2


 


10/28/20 09:02  108  139/67    


 


10/28/20 07:15 98.3  22  92 Nasal Cannula 4.0 





 98.3       








Comments


Strong cough


General:  Alert


Lungs:  Clear


Cardiovascular:  S1, S2


Abdomen:  Soft, Other (Obese)


Neuro Exam:  Alert


Extremities:  No Edema, Other (Mild edema)


Skin:  Warm


Labs





Laboratory Tests








Test


 10/26/20


13:39 10/26/20


15:05 10/26/20


16:12 10/26/20


20:33


 


White Blood Count


 17.1 x10^3/uL


(4.0-11.0) 


 


 





 


Red Blood Count


 4.12 x10^6/uL


(3.50-5.40) 


 


 





 


Hemoglobin


 12.4 g/dL


(12.0-15.5) 


 


 





 


Hematocrit


 37.8 %


(36.0-47.0) 


 


 





 


Mean Corpuscular Volume 92 fL ()    


 


Mean Corpuscular Hemoglobin 30 pg (25-35)    


 


Mean Corpuscular Hemoglobin


Concent 33 g/dL


(31-37) 


 


 





 


Red Cell Distribution Width


 15.6 %


(11.5-14.5) 


 


 





 


Platelet Count


 324 x10^3/uL


(140-400) 


 


 





 


Sodium Level


 135 mmol/L


(136-145) 


 


 





 


Potassium Level


 4.2 mmol/L


(3.5-5.1) 


 


 





 


Chloride Level


 99 mmol/L


() 


 


 





 


Carbon Dioxide Level


 27 mmol/L


(21-32) 


 


 





 


Anion Gap 9 (6-14)    


 


Blood Urea Nitrogen


 19 mg/dL


(7-20) 


 


 





 


Creatinine


 0.6 mg/dL


(0.6-1.0) 


 


 





 


Estimated GFR


(Cockcroft-Gault) 108.6 


 


 


 





 


BUN/Creatinine Ratio 32 (6-20)    


 


Glucose Level


 245 mg/dL


(70-99) 


 


 





 


Calcium Level


 8.9 mg/dL


(8.5-10.1) 


 


 





 


Total Bilirubin


 0.5 mg/dL


(0.2-1.0) 


 


 





 


Aspartate Amino Transf


(AST/SGOT) 53 U/L (15-37) 


 


 


 





 


Alanine Aminotransferase


(ALT/SGPT) 295 U/L


(14-59) 


 


 





 


Alkaline Phosphatase


 132 U/L


() 


 


 





 


Total Protein


 6.1 g/dL


(6.4-8.2) 


 


 





 


Albumin


 2.5 g/dL


(3.4-5.0) 


 


 





 


Albumin/Globulin Ratio 0.7 (1.0-1.7)    


 


Coronavirus (PCR)


 


 Detected (Not


Detected) 


 





 


Glucose (Fingerstick)


 


 


 245 mg/dL


(70-99) 209 mg/dL


(70-99)


 


Test


 10/27/20


08:27 10/27/20


10:54 10/27/20


12:17 10/27/20


16:04


 


Glucose (Fingerstick)


 151 mg/dL


(70-99) 172 mg/dL


(70-99) 


 131 mg/dL


(70-99)


 


White Blood Count


 


 


 13.2 x10^3/uL


(4.0-11.0) 





 


Red Blood Count


 


 


 3.89 x10^6/uL


(3.50-5.40) 





 


Hemoglobin


 


 


 11.7 g/dL


(12.0-15.5) 





 


Hematocrit


 


 


 35.5 %


(36.0-47.0) 





 


Mean Corpuscular Volume   91 fL ()  


 


Mean Corpuscular Hemoglobin   30 pg (25-35)  


 


Mean Corpuscular Hemoglobin


Concent 


 


 33 g/dL


(31-37) 





 


Red Cell Distribution Width


 


 


 15.6 %


(11.5-14.5) 





 


Platelet Count


 


 


 278 x10^3/uL


(140-400) 





 


D-Dimer (Jada)


 


 


 0.68 ug/mlFEU


(0.00-0.50) 





 


Sodium Level


 


 


 137 mmol/L


(136-145) 





 


Potassium Level


 


 


 4.0 mmol/L


(3.5-5.1) 





 


Chloride Level


 


 


 102 mmol/L


() 





 


Carbon Dioxide Level


 


 


 26 mmol/L


(21-32) 





 


Anion Gap   9 (6-14)  


 


Blood Urea Nitrogen


 


 


 18 mg/dL


(7-20) 





 


Creatinine


 


 


 0.6 mg/dL


(0.6-1.0) 





 


Estimated GFR


(Cockcroft-Gault) 


 


 108.6 


 





 


BUN/Creatinine Ratio   30 (6-20)  


 


Glucose Level


 


 


 138 mg/dL


(70-99) 





 


Calcium Level


 


 


 8.4 mg/dL


(8.5-10.1) 





 


Total Bilirubin


 


 


 0.4 mg/dL


(0.2-1.0) 





 


Aspartate Amino Transf


(AST/SGOT) 


 


 45 U/L (15-37) 


 





 


Alanine Aminotransferase


(ALT/SGPT) 


 


 243 U/L


(14-59) 





 


Alkaline Phosphatase


 


 


 117 U/L


() 





 


C-Reactive Protein,


Quantitative 


 


 37.7 mg/L


(0-3.3) 





 


Total Protein


 


 


 5.3 g/dL


(6.4-8.2) 





 


Albumin


 


 


 2.4 g/dL


(3.4-5.0) 





 


Albumin/Globulin Ratio   0.8 (1.0-1.7)  


 


Test


 10/27/20


20:32 10/28/20


08:40 


 





 


Glucose (Fingerstick)


 145 mg/dL


(70-99) 162 mg/dL


(70-99) 


 











Laboratory Tests








Test


 10/27/20


12:17 10/27/20


16:04 10/27/20


20:32 10/28/20


08:40


 


White Blood Count


 13.2 x10^3/uL


(4.0-11.0) 


 


 





 


Red Blood Count


 3.89 x10^6/uL


(3.50-5.40) 


 


 





 


Hemoglobin


 11.7 g/dL


(12.0-15.5) 


 


 





 


Hematocrit


 35.5 %


(36.0-47.0) 


 


 





 


Mean Corpuscular Volume 91 fL ()    


 


Mean Corpuscular Hemoglobin 30 pg (25-35)    


 


Mean Corpuscular Hemoglobin


Concent 33 g/dL


(31-37) 


 


 





 


Red Cell Distribution Width


 15.6 %


(11.5-14.5) 


 


 





 


Platelet Count


 278 x10^3/uL


(140-400) 


 


 





 


D-Dimer (Jada)


 0.68 ug/mlFEU


(0.00-0.50) 


 


 





 


Sodium Level


 137 mmol/L


(136-145) 


 


 





 


Potassium Level


 4.0 mmol/L


(3.5-5.1) 


 


 





 


Chloride Level


 102 mmol/L


() 


 


 





 


Carbon Dioxide Level


 26 mmol/L


(21-32) 


 


 





 


Anion Gap 9 (6-14)    


 


Blood Urea Nitrogen


 18 mg/dL


(7-20) 


 


 





 


Creatinine


 0.6 mg/dL


(0.6-1.0) 


 


 





 


Estimated GFR


(Cockcroft-Gault) 108.6 


 


 


 





 


BUN/Creatinine Ratio 30 (6-20)    


 


Glucose Level


 138 mg/dL


(70-99) 


 


 





 


Calcium Level


 8.4 mg/dL


(8.5-10.1) 


 


 





 


Total Bilirubin


 0.4 mg/dL


(0.2-1.0) 


 


 





 


Aspartate Amino Transf


(AST/SGOT) 45 U/L (15-37) 


 


 


 





 


Alanine Aminotransferase


(ALT/SGPT) 243 U/L


(14-59) 


 


 





 


Alkaline Phosphatase


 117 U/L


() 


 


 





 


C-Reactive Protein,


Quantitative 37.7 mg/L


(0-3.3) 


 


 





 


Total Protein


 5.3 g/dL


(6.4-8.2) 


 


 





 


Albumin


 2.4 g/dL


(3.4-5.0) 


 


 





 


Albumin/Globulin Ratio 0.8 (1.0-1.7)    


 


Glucose (Fingerstick)


 


 131 mg/dL


(70-99) 145 mg/dL


(70-99) 162 mg/dL


(70-99)








Medications





Active Scripts








 Medications  Dose


 Route/Sig


 Max Daily Dose Days Date Category


 


 Cymbalta


  (Duloxetine Hcl)


 20 Mg Capsule.dr  20 Mg


 PO DAILY


   9/22/20 Reported


 


 Lisinopril 5 Mg


 Tablet  5 Mg


 PO DAILY


   9/22/20 Reported


 


 Amitriptyline Hcl


 10 Mg Tablet  10 Mg


 PO DAILY


   9/22/20 Reported


 


 Trulicity


  (Dulaglutide) 1.5


 Mg/0.5 Ml


 Pen.injctr  1.5 Mg


 SQ


   9/22/20 Reported


 


 Nesina


  (Alogliptin


 Benzoate) 12.5 Mg


 Tablet  12.5 Mg


 PO


   9/22/20 Reported


 


 Propranolol Hcl


 80 Mg Tablet  80 Mg


 PO DAILY


   5/14/14 Reported


 


 Cymbalta


  (Duloxetine Hcl)


 60 Mg Capsule.dr  60 Mg


 PO DAILY


   5/14/14 Reported








Comments


CXR 10/23 -- no change





Impression


.


IMPRESSION:


1.  Acute hypoxemic respiratory failure.-- S/P intubation on 10/12/20 

--improving, extubated 10/23/20


2.  COVID-19 viral pneumonia.


3.  Abnormal chest x-ray, possible gram-positive, gram-negative pneumonia.


4.  Headaches.


5.  Type 2 diabetes.


6.  Fibromyalgia.


7.  Morbid obesity.


8.  Nonalcoholic steatohepatitis.


9.  Obstructive sleep apnea.


10. Abnormal D-Dimer due to COVID, improving 10/15


11. Elevated LF likely 2/2 covid 


12.  Critical care polymyopathy





Plan


.


Aggressive PT OT


Than likely will need LTAC, will ask social service to look into it


Continue supplemental oxygen as needed to keep oxygen saturations greater than 

92%


Follow speech recommendation


Follow chest x-ray as needed


Status post plasma and remdesivir


DC steroids


Follow infectious disease recommendations for antibiotics, currently not on 

antibiotic therapy


DVT/GI prophylaxis, Protonix and Lovenox





Discussed with LEWIS BULLOCK MD              Oct 28, 2020 11:48

## 2020-10-28 NOTE — PDOC
Infectious Disease Note


Subjective:


Subjective


 


Pt feeling better


O2 at 4 L


Denies fever, nausea, vomiting, shortness of breath, diarrhea, abdominal pain, 

rash


Otherwise as above





Vital Signs:


Vital Signs





Vital Signs








  Date Time  Temp Pulse Resp B/P (MAP) Pulse Ox O2 Delivery O2 Flow Rate FiO2


 


10/28/20 03:55 98.7 115 16 115/61 (79) 90 Nasal Cannula 4.0 





 98.7       











Physical Exam:


PHYSICAL EXAM


GENERAL: Awake comfortable


HEENT:  Normocephalic, atraumatic.  Anicteric.


NECK:  Supple.  No thyromegaly.


LUNGS:  Coarse breath sounds.  No wheezing.


HEART:  S1, S2, no murmurs.


ABDOMEN:  Obese, soft, nontender, bowel sounds present.


EXTREMITIES:  No edema, no cyanosis.


DERMATOLOGIC:  Warm, dry.  No generalized rash.  Multiple tattoos.


CENTRAL NERVOUS SYSTEM: Alert and oriented no focal deficit





Medications:


Inpatient Meds:





Current Medications








 Medications


  (Trade)  Dose


 Ordered  Sig/Angelic  Start Time


 Stop Time Status Last Admin


Dose Admin


 


 Acetaminophen


  (Tylenol)  650 mg  PRN Q6HRS  PRN  10/15/20 20:30


    10/17/20 05:07


650 MG


 


 Acetaminophen/


 Aspirin/Caffeine


  (Excedrin


 Migraine)  2 tab  PRN Q6HRS  PRN  10/10/20 16:15


     





 


 Acetaminophen/


 Hydrocodone Bitart


  (Lortab 5/325)  1 tab  PRN Q6HRS  PRN  10/10/20 16:15


    10/27/20 15:08


1 TAB


 


 Amino Acids/


 Glycerin/


 Electrolytes  1,000 ml @ 


 80 mls/hr  [id number]  10/24/20 09:00


    10/28/20 01:14


80 MLS/HR


 


 Amitriptyline HCl


  (Elavil)  75 mg  PRN QHS  PRN  10/10/20 16:15


     





 


 Atropine Sulfate


  (ATROPINE 0.5mg


 SYRINGE)  0.5 mg  PRN Q5MIN  PRN  10/23/20 10:45


     





 


 Bisacodyl


  (Dulcolax Supp)  10 mg  PRN DAILY  PRN  10/24/20 09:00


     





 


 Calcium Carbonate/


 Glycine


  (Tums)  1,000 mg  PRN Q4HRS  PRN  10/12/20 10:45


    10/12/20 10:46


1,000 MG


 


 Ceftriaxone Sodium


  (Rocephin)  1 gm  Q24H  10/11/20 10:00


 10/16/20 14:05 DC 10/16/20 10:19


1 GM


 


 Dexmedetomidine


 HCl 400 mcg/


 Sodium Chloride  100 ml @ 0


 mls/hr  CONT  PRN  10/23/20 10:45


 10/24/20 10:13 DC  





 


 Dextrose


  (Dextrose


 50%-Water Syringe)  12.5 gm  PRN Q15MIN  PRN  10/11/20 10:15


   UNV  





 


 Docusate Sodium


  (Colace)  100 mg  BID  10/24/20 09:00


    10/27/20 22:20


100 MG


 


 Duloxetine HCl


  (Cymbalta)  60 mg  BID  10/10/20 21:00


    10/27/20 22:20


60 MG


 


 Enoxaparin Sodium


  (Lovenox 100mg


 Syringe)  100 mg  Q12HR  10/11/20 11:00


 10/11/20 12:53 DC 10/11/20 10:57


100 MG


 


 Enoxaparin Sodium


  (Lovenox 40mg


 Syringe)  40 mg  Q12HR  10/11/20 21:00


    10/27/20 22:21


40 MG


 


 Famotidine


  (Pepcid Vial)  20 mg  BID  10/13/20 09:00


 10/13/20 07:56 DC  





 


 Fentanyl Citrate  55 ml @ 0


 mls/hr  CONT  PRN  10/19/20 10:15


 10/24/20 10:13 DC 10/22/20 13:14


2 MLS/HR


 


 Haloperidol


 Lactate


  (Haldol Inj)  5 mg  Q8HRS  10/21/20 09:45


    10/27/20 22:20


5 MG


 


 Insulin Glargine


  (Lantus Syringe)  30 unit  BID  10/24/20 09:00


    10/27/20 22:29


30 UNIT


 


 Insulin Human


 Lispro


  (HumaLOG)  10 units  TIDACHC  10/26/20 08:00


    10/27/20 22:30


10 UNITS


 


 Ketorolac


 Tromethamine


  (Toradol 30mg


 Vial)  30 mg  PRN Q6HRS  PRN  10/10/20 16:15


 10/15/20 16:14 DC  





 


 Linagliptin


  (Tradjenta)  5 mg  DAILY  10/10/20 17:00


    10/27/20 10:10


5 MG


 


 Lisinopril


  (Prinivil)  5 mg  DAILY  10/10/20 17:00


    10/27/20 10:11


5 MG


 


 Methylprednisolone


 Sodium Succinate


  (SOLU-Medrol


 40MG VIAL)  40 mg  DAILY  10/25/20 10:45


 10/26/20 15:51 DC 10/26/20 10:19


40 MG


 


 Midazolam HCl  100 ml @ 0


 mls/hr  CONT  PRN  10/12/20 15:00


 10/24/20 10:13 DC 10/20/20 12:38


10 MLS/HR


 


 Non-Formulary


 Medication 1 ea/


 Sodium Chloride  230 ml @ 


 460 mls/hr  Q24H  10/12/20 12:00


 10/15/20 12:29 DC 10/15/20 12:42


460 MLS/HR


 


 Ondansetron HCl


  (Zofran)  4 mg  PRN Q4HRS  PRN  10/10/20 16:15


    10/11/20 11:23


4 MG


 


 Pantoprazole


 Sodium


  (PROTONIX VIAL


 for IV PUSH)  40 mg  DAILY  10/14/20 09:00


 10/27/20 15:58 DC 10/27/20 10:10


40 MG


 


 Pantoprazole


 Sodium


  (Protonix)  40 mg  DAILYAC  10/28/20 07:30


     





 


 Piperacillin Sod/


 Tazobactam Sod


 3.375 gm/Sodium


 Chloride  50 ml @ 


 100 mls/hr  Q6HRS  10/16/20 12:00


 10/24/20 10:13 DC 10/24/20 05:58


100 MLS/HR


 


 Polyethylene


 Glycol


  (miraLAX PACKET)  17 gm  DAILY  10/24/20 09:00


     





 


 Propofol


  (Diprivan)  100 mg  1X  ONCE  10/12/20 17:00


 10/12/20 17:01 DC 10/12/20 17:00


100 MG


 


 Sodium Chloride  500 ml @ 


 500 mls/hr  1X PRN  PRN  10/23/20 10:45


     





 


 Succinylcholine


 Chloride


  (Anectine)  100 mg  1X  ONCE  10/12/20 17:00


 10/12/20 17:01 DC  





 


 Vancomycin HCl


  (Vanco Per


 Pharmacy)  1 each  PRN DAILY  PRN  10/16/20 06:30


 10/19/20 12:17 DC 10/18/20 23:33


1 EACH


 


 Vancomycin HCl


  (Vancomycin


 Random Level)  1 each  1X  ONCE  10/20/20 13:00


 10/19/20 09:59 DC  





 


 Vancomycin HCl


  (Vancomycin


 Trough Level)  1 each  1X  ONCE  10/18/20 21:00


 10/19/20 09:59 DC 10/18/20 21:00


1 EACH


 


 Vancomycin HCl


 1.5 gm/Sodium


 Chloride  500 ml @ 


 250 mls/hr  Q8H  10/17/20 21:30


 10/19/20 06:45 DC 10/19/20 06:29


250 MLS/HR


 


 Vancomycin HCl 2


 gm/Sodium Chloride  500 ml @ 


 250 mls/hr  1X  ONCE  10/16/20 07:00


 10/16/20 08:59 DC 10/16/20 06:55


250 MLS/HR


 


 Vecuronium Bromide


  (Norcuron Bolus)  6 mg  PRN Q4HRS  PRN  10/12/20 17:45


 10/24/20 10:13 DC 10/16/20 07:10


6 MG


 


 Verapamil HCl


  (Calan Sr)  120 mg  BID  10/10/20 21:00


 10/19/20 07:11 DC 10/18/20 20:51


120 MG











Labs:


Lab





Laboratory Tests








Test


 10/27/20


08:27 10/27/20


10:54 10/27/20


12:17 10/27/20


16:04


 


Glucose (Fingerstick)


 151 mg/dL


(70-99) 172 mg/dL


(70-99) 


 131 mg/dL


(70-99)


 


White Blood Count


 


 


 13.2 x10^3/uL


(4.0-11.0) 





 


Red Blood Count


 


 


 3.89 x10^6/uL


(3.50-5.40) 





 


Hemoglobin


 


 


 11.7 g/dL


(12.0-15.5) 





 


Hematocrit


 


 


 35.5 %


(36.0-47.0) 





 


Mean Corpuscular Volume   91 fL ()  


 


Mean Corpuscular Hemoglobin   30 pg (25-35)  


 


Mean Corpuscular Hemoglobin


Concent 


 


 33 g/dL


(31-37) 





 


Red Cell Distribution Width


 


 


 15.6 %


(11.5-14.5) 





 


Platelet Count


 


 


 278 x10^3/uL


(140-400) 





 


D-Dimer (Jada)


 


 


 0.68 ug/mlFEU


(0.00-0.50) 





 


Sodium Level


 


 


 137 mmol/L


(136-145) 





 


Potassium Level


 


 


 4.0 mmol/L


(3.5-5.1) 





 


Chloride Level


 


 


 102 mmol/L


() 





 


Carbon Dioxide Level


 


 


 26 mmol/L


(21-32) 





 


Anion Gap   9 (6-14)  


 


Blood Urea Nitrogen


 


 


 18 mg/dL


(7-20) 





 


Creatinine


 


 


 0.6 mg/dL


(0.6-1.0) 





 


Estimated GFR


(Cockcroft-Gault) 


 


 108.6 


 





 


BUN/Creatinine Ratio   30 (6-20)  


 


Glucose Level


 


 


 138 mg/dL


(70-99) 





 


Calcium Level


 


 


 8.4 mg/dL


(8.5-10.1) 





 


Total Bilirubin


 


 


 0.4 mg/dL


(0.2-1.0) 





 


Aspartate Amino Transf


(AST/SGOT) 


 


 45 U/L (15-37) 


 





 


Alanine Aminotransferase


(ALT/SGPT) 


 


 243 U/L


(14-59) 





 


Alkaline Phosphatase


 


 


 117 U/L


() 





 


C-Reactive Protein,


Quantitative 


 


 37.7 mg/L


(0-3.3) 





 


Total Protein


 


 


 5.3 g/dL


(6.4-8.2) 





 


Albumin


 


 


 2.4 g/dL


(3.4-5.0) 





 


Albumin/Globulin Ratio   0.8 (1.0-1.7)  


 


Test


 10/27/20


20:32 


 


 





 


Glucose (Fingerstick)


 145 mg/dL


(70-99) 


 


 














Objective:


Assessment:


1.  Acute hypoxic respiratory failure.  Improved


2.  COVID-19 Pneumonia improved


3.  Migraine headaches.


4.  Diabetes.


5.  Obesity.


6.  Fibromyalgia.


7.  Abnormal LFTs.


8.  Leukocytosis on steroid





Plan:


Plan of Care


cont supportive care


completed remdesivir, steroids 





PT OT











SHYAM BRAND MD           Oct 28, 2020 07:06

## 2020-10-28 NOTE — NUR
ALL following for discharge planning. Spoke with RN and reviewed chart. Discharge plan is 
[facility] pending insurance approval. ALL spoke with [facility] and authorization should come 
through today and they do have beds available. Notified Dr. Becerril

-------------------------------------------------------------------------------

Addendum: 10/28/20 at 1303 by JEAN CARRIZALES

-------------------------------------------------------------------------------

Authorization from insurance approved. Discharge orders phoned and faxed to [facility].  
transportation with [facility] arranged for between 1500 to 1530 via [employer] as [employer]. RN to call report. Packet up to date. No further SW needs at this time.

## 2020-10-28 NOTE — PN
DATE:  10/28/2020



SUBJECTIVE:  The patient is resting, slightly propped up in bed, in no apparent

respiratory distress.  She is awake, alert, responding appropriately.  She seems

to be slightly stronger, although the nursing staff stated that they could still

feed her.  She apparently worked with physical therapy yesterday and walked with

a walker.



PHYSICAL EXAMINATION:

GENERAL:  When I examined her this morning, she looked well and was clearly in

no apparent respiratory distress.  No pallor, jaundice, cyanosis or thyromegaly.

 No jugular venous distention.  No lower limb edema.

VITAL SIGNS:  Her heart rate was 118, blood pressure was 104/68, temperature was

98.5, respiratory rate was 26 and oxygen saturation was 94% on 4 liters of

oxygen.

HEAD, EYES, EARS, NOSE AND THROAT:  Showed normocephalic, atraumatic.

NECK:  Supple.

HEART:  Showed normal first and second heart sounds.  No gallop, rub or murmur.

CHEST:  Clear to auscultation.  No crepitation or rhonchi.

ABDOMEN:  Distended, soft, nontender.

NEUROLOGIC:  She is definitely more awake, alert, responding appropriately.  All

cranial nerves intact.  She moves extremities without difficulty.



Her intake over the last 24 hours was incompletely recorded, output was 1600.



LABORATORY DATA:  As of yesterday, her white cell count was 13,200, hemoglobin

12, hematocrit 36, MCV 91, and platelet count 278,000.  Her chemistry showed a

serum sodium 137, potassium 4.4, chloride 102, bicarbonate 26, anion gap of 9,

BUN 18, creatinine 0.6, estimated GFR was 180 mL per minute.  Her glucose 138,

calcium was 8.4.  Total bilirubin is normal.  AST, ALT, alkaline phosphatase

slightly elevated.  Her C-reactive protein was 37.  Her total protein was 5.3,

albumin was 2.4.  Her D-dimer is down to 0.68 and her coronavirus was still

detectable.



ASSESSMENT:

1.  COVID-19 pneumonia.

2.  Acute hypoxic respiratory failure requiring intubation and mechanical

ventilation; however, she was successfully extubated.  She is now on 4 liters of

oxygen with oxygen saturation of 94%.

3.  She was treated for community-acquired pneumonia with IV antibiotic and

while in the ICU, she spiked a temperature again and was treated with Zosyn and

Zyvox.  She is off oral antibiotic.

4.  The patient has multiple other medical problems including:

A.  Migraine headache.

D.  Type 2 diabetes mellitus.

C.  Fibromyalgia.

D.  Hypertension.

E.  Nephrolithiasis

F.  Nonalcoholic steatohepatitis.

4.  Chronic obstructive pulmonary disease.

5.  The patient states she has extreme muscle weakness, likely due to critical

illness of myopathy.



PLAN:  Obviously to continue with nutritional support.  Continue with DVT and GI

prophylaxis.  Continue to monitor her blood sugar and adjust insulin as needed. 

Continue with procalamine.  The patient apparently was accepted at [facility] waiting for the insurance authorization.

 



______________________________

JULIO CISSE MD



DR:  [id number]  JOB#:  [id number] / [id number]

DD:  10/28/2020 12:03  DT:  10/28/2020 12:22

## 2020-10-28 NOTE — SNU/HH DC
DISCHARGE ORDERS


DISCHARGE INFORMATION:


DISCHARGE DATE:  Oct 28, 2020


FINAL DIAGNOSIS


covid 19 pneumonia


acute hypoxic respiratory failure


critical illness myopathy


CONDITION ON DISCHARGE:  Stable





CODE STATUS:


Code Status:  Full





LTAC:


ADMIT TO LTAC:  Yes





POST DISCHARGE ORDERS:


ACTIVITY ORDERS:  Activity as tolerated, Avoid exertion


WEIGHT BEARING STATUS:  No restrictions, Full weight bearing


BATHING ORDERS:  No Tub Bath until see 


DIET AFTER DISCHARGE:  Regular





TREATMENT/EQUIPMENT ORDERS:


ADAPTIVE EQUIPMENT NEEDED:  None


RESPIRATORY EQUIPMENT NEEDED:  Oxygen


Physical Therapy For:  Evalulation/Treatment


Occupational Therapy For:  Evaluation/Treatment





DISCHARGE MEDICATIONS:


Home Meds


Reported Medications


Duloxetine Hcl (CYMBALTA) 20 Mg Capsule.dr, 20 MG PO DAILY, CAP


   9/22/20


Lisinopril (LISINOPRIL) 5 Mg Tablet, 5 MG PO DAILY for FOR HYPERTENSION, #30 TAB

0 Refills


   9/22/20


Amitriptyline Hcl (AMITRIPTYLINE HCL) 10 Mg Tablet, 10 MG PO DAILY, TAB


   9/22/20


Alogliptin Benzoate (NESINA) 12.5 Mg Tablet, 12.5 MG PO, TAB


   9/22/20


Discontinued Reported Medications


Dulaglutide (Trulicity) 1.5 Mg/0.5 Ml Pen.injctr, 1.5 MG SQ, EACH


   9/22/20


Propranolol Hcl (PROPRANOLOL HCL) 80 Mg Tablet, 80 MG PO DAILY


   5/14/14


Duloxetine Hcl (CYMBALTA) 60 Mg Capsule.dr, 60 MG PO DAILY


   5/14/14











JULIO CISSE MD                Oct 28, 2020 12:35
